# Patient Record
Sex: MALE | Race: WHITE | Employment: OTHER | RURAL
[De-identification: names, ages, dates, MRNs, and addresses within clinical notes are randomized per-mention and may not be internally consistent; named-entity substitution may affect disease eponyms.]

---

## 2017-03-31 ENCOUNTER — OFFICE VISIT (OUTPATIENT)
Dept: PRIMARY CARE CLINIC | Age: 67
End: 2017-03-31
Payer: MEDICARE

## 2017-03-31 VITALS
BODY MASS INDEX: 28.09 KG/M2 | HEART RATE: 97 BPM | TEMPERATURE: 97.7 F | OXYGEN SATURATION: 92 % | DIASTOLIC BLOOD PRESSURE: 80 MMHG | WEIGHT: 190.2 LBS | SYSTOLIC BLOOD PRESSURE: 130 MMHG

## 2017-03-31 DIAGNOSIS — J40 BRONCHITIS: ICD-10-CM

## 2017-03-31 DIAGNOSIS — M79.10 MYALGIA: Primary | ICD-10-CM

## 2017-03-31 DIAGNOSIS — R05.9 COUGH: ICD-10-CM

## 2017-03-31 DIAGNOSIS — R51.9 SINUS HEADACHE: ICD-10-CM

## 2017-03-31 DIAGNOSIS — J01.10 SUBACUTE FRONTAL SINUSITIS: ICD-10-CM

## 2017-03-31 LAB
INFLUENZA A ANTIBODY: NORMAL
INFLUENZA B ANTIBODY: NORMAL

## 2017-03-31 PROCEDURE — 99213 OFFICE O/P EST LOW 20 MIN: CPT | Performed by: NURSE PRACTITIONER

## 2017-03-31 PROCEDURE — 4004F PT TOBACCO SCREEN RCVD TLK: CPT | Performed by: NURSE PRACTITIONER

## 2017-03-31 PROCEDURE — G8427 DOCREV CUR MEDS BY ELIG CLIN: HCPCS | Performed by: NURSE PRACTITIONER

## 2017-03-31 PROCEDURE — G8484 FLU IMMUNIZE NO ADMIN: HCPCS | Performed by: NURSE PRACTITIONER

## 2017-03-31 PROCEDURE — 87804 INFLUENZA ASSAY W/OPTIC: CPT | Performed by: NURSE PRACTITIONER

## 2017-03-31 PROCEDURE — G8419 CALC BMI OUT NRM PARAM NOF/U: HCPCS | Performed by: NURSE PRACTITIONER

## 2017-03-31 PROCEDURE — 3017F COLORECTAL CA SCREEN DOC REV: CPT | Performed by: NURSE PRACTITIONER

## 2017-03-31 RX ORDER — LEVOFLOXACIN 500 MG/1
500 TABLET, FILM COATED ORAL DAILY
Qty: 10 TABLET | Refills: 0 | Status: SHIPPED | OUTPATIENT
Start: 2017-03-31 | End: 2017-04-10

## 2017-03-31 ASSESSMENT — ENCOUNTER SYMPTOMS
COUGH: 1
GASTROINTESTINAL NEGATIVE: 1
SORE THROAT: 1
EYES NEGATIVE: 1
SINUS PRESSURE: 1

## 2017-04-12 ENCOUNTER — OFFICE VISIT (OUTPATIENT)
Dept: PHARMACY | Age: 67
End: 2017-04-12

## 2017-04-12 DIAGNOSIS — Z79.899 MEDICATION MANAGEMENT: Primary | ICD-10-CM

## 2017-04-13 RX ORDER — QUETIAPINE FUMARATE 200 MG/1
200 TABLET, FILM COATED ORAL 2 TIMES DAILY
COMMUNITY
End: 2019-08-01 | Stop reason: SDUPTHER

## 2017-04-13 RX ORDER — METHOCARBAMOL 750 MG/1
750 TABLET, FILM COATED ORAL 3 TIMES DAILY PRN
COMMUNITY
End: 2017-10-10

## 2017-04-13 RX ORDER — BUDESONIDE AND FORMOTEROL FUMARATE DIHYDRATE 160; 4.5 UG/1; UG/1
2 AEROSOL RESPIRATORY (INHALATION) 2 TIMES DAILY PRN
COMMUNITY
End: 2019-08-01 | Stop reason: SDUPTHER

## 2017-04-13 RX ORDER — FLUTICASONE PROPIONATE 50 MCG
1 SPRAY, SUSPENSION (ML) NASAL DAILY PRN
COMMUNITY
End: 2019-08-01 | Stop reason: SDUPTHER

## 2017-04-13 RX ORDER — DULOXETIN HYDROCHLORIDE 60 MG/1
60 CAPSULE, DELAYED RELEASE ORAL DAILY
COMMUNITY
End: 2019-08-01 | Stop reason: SDUPTHER

## 2017-04-13 RX ORDER — ALBUTEROL SULFATE 90 UG/1
2 AEROSOL, METERED RESPIRATORY (INHALATION) EVERY 6 HOURS PRN
COMMUNITY
End: 2019-08-01 | Stop reason: SDUPTHER

## 2017-04-13 RX ORDER — MIRTAZAPINE 30 MG/1
30 TABLET, FILM COATED ORAL NIGHTLY
COMMUNITY
End: 2017-10-10

## 2017-04-13 RX ORDER — UBIDECARENONE 75 MG
500 CAPSULE ORAL DAILY
COMMUNITY
End: 2019-08-01 | Stop reason: SDUPTHER

## 2017-05-30 ENCOUNTER — HOSPITAL ENCOUNTER (OUTPATIENT)
Dept: OTHER | Age: 67
Discharge: HOME | End: 2017-05-30
Attending: NURSE PRACTITIONER | Admitting: NURSE PRACTITIONER

## 2017-05-30 DIAGNOSIS — M54.9 BACK PAIN, UNSPECIFIED BACK LOCATION, UNSPECIFIED BACK PAIN LATERALITY, UNSPECIFIED CHRONICITY: ICD-10-CM

## 2017-05-30 DIAGNOSIS — M54.2 NECK PAIN: ICD-10-CM

## 2017-06-09 ENCOUNTER — HOSPITAL ENCOUNTER (OUTPATIENT)
Dept: OTHER | Age: 67
Discharge: HOME | End: 2017-06-09
Attending: NURSE PRACTITIONER | Admitting: NURSE PRACTITIONER

## 2017-06-09 LAB
A/G RATIO: 1.4 (ref 0.8–2)
ALBUMIN SERPL-MCNC: 4.5 G/DL (ref 3.4–4.8)
ALP BLD-CCNC: 39 U/L (ref 25–100)
ALT SERPL-CCNC: 14 U/L (ref 4–36)
ANION GAP SERPL CALCULATED.3IONS-SCNC: 16 MMOL/L (ref 3–16)
AST SERPL-CCNC: 16 U/L (ref 8–33)
BASOPHILS ABSOLUTE: 0 K/UL (ref 0–0.1)
BASOPHILS RELATIVE PERCENT: 0.5 %
BILIRUB SERPL-MCNC: 0.5 MG/DL (ref 0.3–1.2)
BUN BLDV-MCNC: 8 MG/DL (ref 6–20)
CALCIUM SERPL-MCNC: 9.6 MG/DL (ref 8.5–10.5)
CHLORIDE BLD-SCNC: 95 MMOL/L (ref 98–107)
CHOLESTEROL, TOTAL: 202 MG/DL (ref 0–200)
CO2: 25 MMOL/L (ref 20–30)
CREAT SERPL-MCNC: 1 MG/DL (ref 0.4–1.2)
EOSINOPHILS ABSOLUTE: 0.2 K/UL (ref 0–0.4)
EOSINOPHILS RELATIVE PERCENT: 2.6 %
FOLATE: 2.98 NG/ML
GFR AFRICAN AMERICAN: >59
GFR NON-AFRICAN AMERICAN: >59
GLOBULIN: 3.2 G/DL
GLUCOSE BLD-MCNC: 86 MG/DL (ref 74–106)
HCT VFR BLD CALC: 45.4 % (ref 40–54)
HDLC SERPL-MCNC: 66 MG/DL (ref 40–60)
HEMOGLOBIN: 15 G/DL (ref 13–18)
LDL CHOLESTEROL CALCULATED: 116 MG/DL
LYMPHOCYTES ABSOLUTE: 1.9 K/UL (ref 1.5–4)
LYMPHOCYTES RELATIVE PERCENT: 23.2 % (ref 20–40)
MCH RBC QN AUTO: 32.6 PG (ref 27–32)
MCHC RBC AUTO-ENTMCNC: 33 G/DL (ref 31–35)
MCV RBC AUTO: 98.7 FL (ref 80–100)
MONOCYTES ABSOLUTE: 1 K/UL (ref 0.2–0.8)
MONOCYTES RELATIVE PERCENT: 12.3 % (ref 3–10)
NEUTROPHILS ABSOLUTE: 5 K/UL (ref 2–7.5)
NEUTROPHILS RELATIVE PERCENT: 61.4 %
PDW BLD-RTO: 14.4 % (ref 11–16)
PLATELET # BLD: 392 K/UL (ref 150–400)
PMV BLD AUTO: 10.3 FL (ref 6–10)
POTASSIUM SERPL-SCNC: 4.7 MMOL/L (ref 3.4–5.1)
RBC # BLD: 4.6 M/UL (ref 4.5–6)
SODIUM BLD-SCNC: 136 MMOL/L (ref 136–145)
TOTAL PROTEIN: 7.7 G/DL (ref 6.4–8.3)
TRIGL SERPL-MCNC: 100 MG/DL (ref 0–249)
TSH SERPL DL<=0.05 MIU/L-ACNC: 0.88 UIU/ML (ref 0.35–5.5)
VITAMIN B-12: 190 PG/ML (ref 211–911)
VLDLC SERPL CALC-MCNC: 20 MG/DL
WBC # BLD: 8.1 K/UL (ref 4–11)

## 2017-07-13 ENCOUNTER — CONSULT (OUTPATIENT)
Dept: CARDIOLOGY | Facility: CLINIC | Age: 67
End: 2017-07-13

## 2017-07-13 ENCOUNTER — HOSPITAL ENCOUNTER (OUTPATIENT)
Dept: OTHER | Age: 67
Discharge: HOME | End: 2017-07-13
Attending: INTERNAL MEDICINE | Admitting: INTERNAL MEDICINE

## 2017-07-13 VITALS
DIASTOLIC BLOOD PRESSURE: 70 MMHG | BODY MASS INDEX: 27 KG/M2 | SYSTOLIC BLOOD PRESSURE: 124 MMHG | HEIGHT: 70 IN | HEART RATE: 76 BPM | WEIGHT: 188.6 LBS

## 2017-07-13 DIAGNOSIS — I10 ESSENTIAL HYPERTENSION: Primary | ICD-10-CM

## 2017-07-13 PROCEDURE — 99213 OFFICE O/P EST LOW 20 MIN: CPT | Performed by: INTERNAL MEDICINE

## 2017-07-13 RX ORDER — MIRTAZAPINE 30 MG/1
TABLET, FILM COATED ORAL DAILY
Refills: 2 | COMMUNITY
Start: 2017-06-30

## 2017-07-13 RX ORDER — METHOCARBAMOL 750 MG/1
TABLET, FILM COATED ORAL AS NEEDED
Refills: 0 | COMMUNITY
Start: 2017-07-07

## 2017-07-13 RX ORDER — CETIRIZINE HYDROCHLORIDE 10 MG/1
TABLET ORAL DAILY
Refills: 0 | COMMUNITY
Start: 2017-04-28

## 2017-07-13 RX ORDER — METOPROLOL SUCCINATE 25 MG/1
TABLET, EXTENDED RELEASE ORAL DAILY
Refills: 5 | COMMUNITY
Start: 2017-06-30

## 2017-07-13 RX ORDER — BUDESONIDE AND FORMOTEROL FUMARATE DIHYDRATE 160; 4.5 UG/1; UG/1
AEROSOL RESPIRATORY (INHALATION) AS NEEDED
Refills: 5 | COMMUNITY
Start: 2017-06-30

## 2017-07-13 RX ORDER — DULOXETIN HYDROCHLORIDE 60 MG/1
CAPSULE, DELAYED RELEASE ORAL DAILY
Refills: 2 | COMMUNITY
Start: 2017-06-30

## 2017-07-13 RX ORDER — HYDROCODONE BITARTRATE AND ACETAMINOPHEN 7.5; 325 MG/1; MG/1
TABLET ORAL AS NEEDED
Refills: 0 | COMMUNITY
Start: 2017-07-07

## 2017-07-13 RX ORDER — ALPRAZOLAM 0.5 MG/1
TABLET ORAL AS NEEDED
Refills: 0 | COMMUNITY
Start: 2017-07-07

## 2017-07-13 RX ORDER — OMEPRAZOLE 20 MG/1
CAPSULE, DELAYED RELEASE ORAL DAILY
Refills: 5 | COMMUNITY
Start: 2017-06-30

## 2017-07-13 RX ORDER — LANOLIN ALCOHOL/MO/W.PET/CERES
CREAM (GRAM) TOPICAL DAILY
Refills: 5 | COMMUNITY
Start: 2017-07-07

## 2017-07-13 RX ORDER — GABAPENTIN 800 MG/1
TABLET ORAL 2 TIMES DAILY
Refills: 0 | COMMUNITY
Start: 2017-07-07

## 2017-07-13 RX ORDER — QUETIAPINE FUMARATE 200 MG/1
TABLET, FILM COATED ORAL DAILY
Refills: 5 | COMMUNITY
Start: 2017-06-30

## 2017-07-13 NOTE — PROGRESS NOTES
Subjective:     Encounter Date:07/13/2017      Patient ID: Max Kelly is a 67 y.o. male.    Chief Complaint:Hypertension  HPI  This is a 67-year-old male patient who is been followed in the past by Dr. Damon for hypertension.  Several years ago the patient had a presyncopal episode and had a thorough workup which was unremarkable.  The patient has had intermittent episodes of dizziness with postural change but no recurrent syncope.  He denies having chest discomfort.  There is no exertional chest arm neck jaw shoulder or back discomfort.  He has no orthopnea PND or lower extremity edema.  He has no palpitations or syncope.  The remainder of his past medical history, family history and social history remains unchanged compared to his last visit.  The following portions of the patient's history were reviewed and updated as appropriate: allergies, current medications, past family history, past medical history, past social history, past surgical history and problem  Review of Systems   Constitution: Negative for chills, diaphoresis, fever, weakness, malaise/fatigue, night sweats, weight gain and weight loss.   HENT: Negative for ear discharge, hearing loss, hoarse voice and nosebleeds.    Eyes: Negative for discharge, double vision, pain and photophobia.   Cardiovascular: Negative for chest pain, claudication, cyanosis, dyspnea on exertion, irregular heartbeat, leg swelling, near-syncope, orthopnea, palpitations, paroxysmal nocturnal dyspnea and syncope.   Respiratory: Negative for cough, hemoptysis, shortness of breath, sputum production and wheezing.    Endocrine: Negative for cold intolerance, heat intolerance, polydipsia, polyphagia and polyuria.   Hematologic/Lymphatic: Negative for adenopathy and bleeding problem. Does not bruise/bleed easily.   Skin: Negative for color change, flushing, itching and rash.   Musculoskeletal: Negative for muscle cramps, muscle weakness, myalgias and stiffness.    Gastrointestinal: Negative for abdominal pain, diarrhea, hematemesis, hematochezia, nausea and vomiting.   Genitourinary: Negative for dysuria, frequency and nocturia.   Neurological: Positive for dizziness. Negative for focal weakness, loss of balance, numbness, paresthesias and seizures.   Psychiatric/Behavioral: Negative for altered mental status, hallucinations and suicidal ideas.   Allergic/Immunologic: Negative for HIV exposure, hives and persistent infections.       Current Outpatient Prescriptions:   •  ALPRAZolam (XANAX) 0.5 MG tablet, As Needed., Disp: , Rfl: 0  •  cetirizine (zyrTEC) 10 MG tablet, Daily., Disp: , Rfl: 0  •  DULoxetine (CYMBALTA) 60 MG capsule, Daily., Disp: , Rfl: 2  •  gabapentin (NEURONTIN) 800 MG tablet, 2 (Two) Times a Day., Disp: , Rfl: 0  •  HYDROcodone-acetaminophen (NORCO) 7.5-325 MG per tablet, As Needed., Disp: , Rfl: 0  •  methocarbamol (ROBAXIN) 750 MG tablet, As Needed., Disp: , Rfl: 0  •  metoprolol succinate XL (TOPROL-XL) 25 MG 24 hr tablet, Daily., Disp: , Rfl: 5  •  mirtazapine (REMERON) 30 MG tablet, Daily., Disp: , Rfl: 2  •  omeprazole (priLOSEC) 20 MG capsule, Daily., Disp: , Rfl: 5  •  PROAIR  (90 BASE) MCG/ACT inhaler, As Needed., Disp: , Rfl: 5  •  QUEtiapine (SEROquel) 200 MG tablet, Daily., Disp: , Rfl: 5  •  SYMBICORT 160-4.5 MCG/ACT inhaler, As Needed., Disp: , Rfl: 5  •  vitamin B-12 (CYANOCOBALAMIN) 1000 MCG tablet, Daily., Disp: , Rfl: 5     Objective:     Physical Exam   Constitutional: He is oriented to person, place, and time. He appears well-developed and well-nourished.   HENT:   Head: Normocephalic and atraumatic.   Eyes: Conjunctivae are normal. No scleral icterus.   Cardiovascular: Normal rate, regular rhythm, normal heart sounds and intact distal pulses.  Exam reveals no gallop and no friction rub.    No murmur heard.  Pulmonary/Chest: Effort normal and breath sounds normal. No respiratory distress.   Abdominal: Soft. Bowel sounds are  "normal. There is no tenderness.   Musculoskeletal: He exhibits no edema.   Neurological: He is alert and oriented to person, place, and time.   Skin: Skin is warm and dry.   Psychiatric: He has a normal mood and affect. His behavior is normal.     Blood pressure 124/70, pulse 76, height 69.5\" (176.5 cm), weight 188 lb 9.6 oz (85.5 kg).   Lab Review:       Assessment:         1. Essential hypertension  Excellent blood pressure control    Procedures     Plan:       No change in medications indicated.  No additional cardiovascular testing is warranted at this time.  The patient will follow-up in our office in 6-12 months.         "

## 2017-10-21 PROBLEM — S81.811A LACERATION OF LEG, RIGHT, INITIAL ENCOUNTER: Status: ACTIVE | Noted: 2017-10-21

## 2018-06-18 ENCOUNTER — HOSPITAL ENCOUNTER (OUTPATIENT)
Dept: OTHER | Age: 68
Discharge: HOME | End: 2018-06-18

## 2018-06-18 DIAGNOSIS — R52 PAIN: ICD-10-CM

## 2018-06-18 LAB
A/G RATIO: 1.7 (ref 0.8–2)
ALBUMIN SERPL-MCNC: 4.4 G/DL (ref 3.4–4.8)
ALP BLD-CCNC: 36 U/L (ref 25–100)
ALT SERPL-CCNC: 15 U/L (ref 4–36)
ANION GAP SERPL CALCULATED.3IONS-SCNC: 12 MMOL/L (ref 3–16)
AST SERPL-CCNC: 18 U/L (ref 8–33)
BASOPHILS ABSOLUTE: 0.1 K/UL (ref 0–0.1)
BASOPHILS RELATIVE PERCENT: 0.6 %
BILIRUB SERPL-MCNC: 0.4 MG/DL (ref 0.3–1.2)
BUN BLDV-MCNC: 10 MG/DL (ref 6–20)
CALCIUM SERPL-MCNC: 9.4 MG/DL (ref 8.5–10.5)
CHLORIDE BLD-SCNC: 97 MMOL/L (ref 98–107)
CHOLESTEROL, TOTAL: 210 MG/DL (ref 0–200)
CO2: 29 MMOL/L (ref 20–30)
CREAT SERPL-MCNC: 1 MG/DL (ref 0.4–1.2)
EOSINOPHILS ABSOLUTE: 0.2 K/UL (ref 0–0.4)
EOSINOPHILS RELATIVE PERCENT: 2.9 %
FOLATE: 5.2 NG/ML
GFR AFRICAN AMERICAN: >59
GFR NON-AFRICAN AMERICAN: >59
GLOBULIN: 2.6 G/DL
GLUCOSE BLD-MCNC: 86 MG/DL (ref 74–106)
HCT VFR BLD CALC: 44.5 % (ref 40–54)
HDLC SERPL-MCNC: 62 MG/DL (ref 40–60)
HEMOGLOBIN: 13.9 G/DL (ref 13–18)
IMMATURE GRANULOCYTES #: 0 K/UL
IMMATURE GRANULOCYTES %: 0.4 % (ref 0–5)
LDL CHOLESTEROL CALCULATED: 129 MG/DL
LYMPHOCYTES ABSOLUTE: 1.6 K/UL (ref 1.5–4)
LYMPHOCYTES RELATIVE PERCENT: 20.4 %
MCH RBC QN AUTO: 30.6 PG (ref 27–32)
MCHC RBC AUTO-ENTMCNC: 31.2 G/DL (ref 31–35)
MCV RBC AUTO: 98 FL (ref 80–100)
MONOCYTES ABSOLUTE: 0.8 K/UL (ref 0.2–0.8)
MONOCYTES RELATIVE PERCENT: 10.6 %
NEUTROPHILS ABSOLUTE: 5.2 K/UL (ref 2–7.5)
NEUTROPHILS RELATIVE PERCENT: 65.1 %
PDW BLD-RTO: 13.4 % (ref 11–16)
PLATELET # BLD: 357 K/UL (ref 150–400)
PMV BLD AUTO: 10.4 FL (ref 6–10)
POTASSIUM SERPL-SCNC: 4.8 MMOL/L (ref 3.4–5.1)
RBC # BLD: 4.54 M/UL (ref 4.5–6)
SODIUM BLD-SCNC: 138 MMOL/L (ref 136–145)
TOTAL PROTEIN: 7 G/DL (ref 6.4–8.3)
TRIGL SERPL-MCNC: 95 MG/DL (ref 0–249)
TSH SERPL DL<=0.05 MIU/L-ACNC: 1.88 UIU/ML (ref 0.35–5.5)
VITAMIN B-12: 318 PG/ML (ref 211–911)
VLDLC SERPL CALC-MCNC: 19 MG/DL
WBC # BLD: 7.9 K/UL (ref 4–11)

## 2018-07-02 ENCOUNTER — HOSPITAL ENCOUNTER (OUTPATIENT)
Dept: GENERAL RADIOLOGY | Age: 68
Discharge: HOME | End: 2018-07-02
Admitting: NURSE PRACTITIONER

## 2018-07-02 DIAGNOSIS — M54.50 LOW BACK PAIN: ICD-10-CM

## 2018-07-02 DIAGNOSIS — R52 PAIN: ICD-10-CM

## 2018-10-25 ENCOUNTER — HOSPITAL ENCOUNTER (OUTPATIENT)
Facility: HOSPITAL | Age: 68
Discharge: HOME | End: 2018-10-25
Payer: COMMERCIAL

## 2018-10-25 LAB
A/G RATIO: 1.6 (ref 0.8–2)
ALBUMIN SERPL-MCNC: 4.4 G/DL (ref 3.4–4.8)
ALP BLD-CCNC: 33 U/L (ref 25–100)
ALT SERPL-CCNC: 11 U/L (ref 4–36)
ANION GAP SERPL CALCULATED.3IONS-SCNC: 14 MMOL/L (ref 3–16)
AST SERPL-CCNC: 18 U/L (ref 8–33)
BASOPHILS ABSOLUTE: 0.1 K/UL (ref 0–0.1)
BASOPHILS RELATIVE PERCENT: 0.9 %
BILIRUB SERPL-MCNC: 0.5 MG/DL (ref 0.3–1.2)
BUN BLDV-MCNC: 9 MG/DL (ref 6–20)
CALCIUM SERPL-MCNC: 9.5 MG/DL (ref 8.5–10.5)
CHLORIDE BLD-SCNC: 100 MMOL/L (ref 98–107)
CHOLESTEROL, TOTAL: 172 MG/DL (ref 0–200)
CO2: 24 MMOL/L (ref 20–30)
CREAT SERPL-MCNC: 1 MG/DL (ref 0.4–1.2)
EOSINOPHILS ABSOLUTE: 0.3 K/UL (ref 0–0.4)
EOSINOPHILS RELATIVE PERCENT: 3 %
FOLATE: 5.47 NG/ML
GFR AFRICAN AMERICAN: >59
GFR NON-AFRICAN AMERICAN: >59
GLOBULIN: 2.7 G/DL
GLUCOSE BLD-MCNC: 95 MG/DL (ref 74–106)
HCT VFR BLD CALC: 45.7 % (ref 40–54)
HDLC SERPL-MCNC: 58 MG/DL (ref 40–60)
HEMOGLOBIN: 14.2 G/DL (ref 13–18)
IMMATURE GRANULOCYTES #: 0 K/UL
IMMATURE GRANULOCYTES %: 0.5 % (ref 0–5)
LDL CHOLESTEROL CALCULATED: 101 MG/DL
LYMPHOCYTES ABSOLUTE: 1.6 K/UL (ref 1.5–4)
LYMPHOCYTES RELATIVE PERCENT: 18.6 %
MCH RBC QN AUTO: 31 PG (ref 27–32)
MCHC RBC AUTO-ENTMCNC: 31.1 G/DL (ref 31–35)
MCV RBC AUTO: 99.8 FL (ref 80–100)
MONOCYTES ABSOLUTE: 0.8 K/UL (ref 0.2–0.8)
MONOCYTES RELATIVE PERCENT: 9.2 %
NEUTROPHILS ABSOLUTE: 5.7 K/UL (ref 2–7.5)
NEUTROPHILS RELATIVE PERCENT: 67.8 %
PDW BLD-RTO: 14.6 % (ref 11–16)
PLATELET # BLD: 342 K/UL (ref 150–400)
PMV BLD AUTO: 10.9 FL (ref 6–10)
POTASSIUM SERPL-SCNC: 5.2 MMOL/L (ref 3.4–5.1)
PROSTATE SPECIFIC ANTIGEN: 0.7 NG/ML (ref 0–4)
RBC # BLD: 4.58 M/UL (ref 4.5–6)
SODIUM BLD-SCNC: 138 MMOL/L (ref 136–145)
TOTAL PROTEIN: 7.1 G/DL (ref 6.4–8.3)
TRIGL SERPL-MCNC: 67 MG/DL (ref 0–249)
TSH SERPL DL<=0.05 MIU/L-ACNC: 1.25 UIU/ML (ref 0.35–5.5)
VITAMIN B-12: 390 PG/ML (ref 211–911)
VLDLC SERPL CALC-MCNC: 13 MG/DL
WBC # BLD: 8.5 K/UL (ref 4–11)

## 2018-10-25 PROCEDURE — 80061 LIPID PANEL: CPT

## 2018-10-25 PROCEDURE — 36415 COLL VENOUS BLD VENIPUNCTURE: CPT

## 2018-10-25 PROCEDURE — 82607 VITAMIN B-12: CPT

## 2018-10-25 PROCEDURE — 80053 COMPREHEN METABOLIC PANEL: CPT

## 2018-10-25 PROCEDURE — 82746 ASSAY OF FOLIC ACID SERUM: CPT

## 2018-10-25 PROCEDURE — 85025 COMPLETE CBC W/AUTO DIFF WBC: CPT

## 2018-10-25 PROCEDURE — 84443 ASSAY THYROID STIM HORMONE: CPT

## 2018-10-25 PROCEDURE — 84153 ASSAY OF PSA TOTAL: CPT

## 2019-01-17 ENCOUNTER — HOSPITAL ENCOUNTER (EMERGENCY)
Facility: HOSPITAL | Age: 69
Discharge: HOME | End: 2019-01-17
Attending: HOSPITALIST
Payer: COMMERCIAL

## 2019-01-17 ENCOUNTER — APPOINTMENT (OUTPATIENT)
Dept: CT IMAGING | Facility: HOSPITAL | Age: 69
End: 2019-01-17
Payer: COMMERCIAL

## 2019-01-17 VITALS
HEART RATE: 74 BPM | OXYGEN SATURATION: 97 % | SYSTOLIC BLOOD PRESSURE: 163 MMHG | RESPIRATION RATE: 18 BRPM | BODY MASS INDEX: 28.29 KG/M2 | DIASTOLIC BLOOD PRESSURE: 108 MMHG | HEIGHT: 69 IN | WEIGHT: 191 LBS

## 2019-01-17 DIAGNOSIS — R42 DIZZINESS: ICD-10-CM

## 2019-01-17 DIAGNOSIS — R55 NEAR SYNCOPE: Primary | ICD-10-CM

## 2019-01-17 LAB
A/G RATIO: 1.3 (ref 0.8–2)
ALBUMIN SERPL-MCNC: 3.9 G/DL (ref 3.4–4.8)
ALP BLD-CCNC: 29 U/L (ref 25–100)
ALT SERPL-CCNC: 11 U/L (ref 4–36)
AMPHETAMINE SCREEN, URINE: ABNORMAL
ANION GAP SERPL CALCULATED.3IONS-SCNC: 11 MMOL/L (ref 3–16)
AST SERPL-CCNC: 12 U/L (ref 8–33)
BARBITURATE SCREEN URINE: ABNORMAL
BASOPHILS ABSOLUTE: 0.1 K/UL (ref 0–0.1)
BASOPHILS RELATIVE PERCENT: 0.7 %
BENZODIAZEPINE SCREEN, URINE: POSITIVE
BILIRUB SERPL-MCNC: 0.7 MG/DL (ref 0.3–1.2)
BILIRUBIN URINE: NEGATIVE
BLOOD, URINE: NEGATIVE
BUN BLDV-MCNC: 13 MG/DL (ref 6–20)
CALCIUM SERPL-MCNC: 8.5 MG/DL (ref 8.5–10.5)
CANNABINOID SCREEN URINE: ABNORMAL
CHLORIDE BLD-SCNC: 98 MMOL/L (ref 98–107)
CLARITY: CLEAR
CO2: 26 MMOL/L (ref 20–30)
COCAINE METABOLITE SCREEN URINE: ABNORMAL
COLOR: YELLOW
CREAT SERPL-MCNC: 1.1 MG/DL (ref 0.4–1.2)
EOSINOPHILS ABSOLUTE: 0.2 K/UL (ref 0–0.4)
EOSINOPHILS RELATIVE PERCENT: 2.1 %
GFR AFRICAN AMERICAN: >59
GFR NON-AFRICAN AMERICAN: >59
GLOBULIN: 2.9 G/DL
GLUCOSE BLD-MCNC: 88 MG/DL (ref 74–106)
GLUCOSE URINE: NEGATIVE MG/DL
HCT VFR BLD CALC: 48.1 % (ref 40–54)
HEMOGLOBIN: 15.3 G/DL (ref 13–18)
IMMATURE GRANULOCYTES #: 0 K/UL
IMMATURE GRANULOCYTES %: 0.4 % (ref 0–5)
KETONES, URINE: NEGATIVE MG/DL
LEUKOCYTE ESTERASE, URINE: NEGATIVE
LIPASE: 42 U/L (ref 5.6–51.3)
LYMPHOCYTES ABSOLUTE: 1.8 K/UL (ref 1.5–4)
LYMPHOCYTES RELATIVE PERCENT: 19.6 %
Lab: ABNORMAL
MCH RBC QN AUTO: 31.9 PG (ref 27–32)
MCHC RBC AUTO-ENTMCNC: 31.8 G/DL (ref 31–35)
MCV RBC AUTO: 100.4 FL (ref 80–100)
METHADONE SCREEN, URINE: ABNORMAL
METHAMPHETAMINE, URINE: ABNORMAL
MICROSCOPIC EXAMINATION: NORMAL
MONOCYTES ABSOLUTE: 1 K/UL (ref 0.2–0.8)
MONOCYTES RELATIVE PERCENT: 11.3 %
NEUTROPHILS ABSOLUTE: 5.9 K/UL (ref 2–7.5)
NEUTROPHILS RELATIVE PERCENT: 65.9 %
NITRITE, URINE: NEGATIVE
OPIATE SCREEN URINE: ABNORMAL
PDW BLD-RTO: 12.9 % (ref 11–16)
PH UA: 7
PHENCYCLIDINE SCREEN URINE: ABNORMAL
PLATELET # BLD: 377 K/UL (ref 150–400)
PMV BLD AUTO: 9.6 FL (ref 6–10)
POTASSIUM REFLEX MAGNESIUM: 4.7 MMOL/L (ref 3.4–5.1)
PROPOXYPHENE SCREEN, URINE: ABNORMAL
PROTEIN UA: NEGATIVE MG/DL
RBC # BLD: 4.79 M/UL (ref 4.5–6)
REASON FOR REJECTION: NORMAL
REJECTED TEST: NORMAL
SODIUM BLD-SCNC: 135 MMOL/L (ref 136–145)
SPECIFIC GRAVITY UA: 1.01
TOTAL PROTEIN: 6.8 G/DL (ref 6.4–8.3)
TRICYCLIC, URINE: POSITIVE
TROPONIN: <0.3 NG/ML
UR OXYCODONE RAPID SCREEN: ABNORMAL
URINE REFLEX TO CULTURE: NORMAL
URINE TYPE: NORMAL
UROBILINOGEN, URINE: 0.2 E.U./DL
WBC # BLD: 8.9 K/UL (ref 4–11)

## 2019-01-17 PROCEDURE — 70450 CT HEAD/BRAIN W/O DYE: CPT

## 2019-01-17 PROCEDURE — 81003 URINALYSIS AUTO W/O SCOPE: CPT

## 2019-01-17 PROCEDURE — 99284 EMERGENCY DEPT VISIT MOD MDM: CPT

## 2019-01-17 PROCEDURE — 93005 ELECTROCARDIOGRAM TRACING: CPT

## 2019-01-17 PROCEDURE — 80307 DRUG TEST PRSMV CHEM ANLYZR: CPT

## 2019-01-17 PROCEDURE — 84484 ASSAY OF TROPONIN QUANT: CPT

## 2019-01-17 PROCEDURE — 85025 COMPLETE CBC W/AUTO DIFF WBC: CPT

## 2019-01-17 PROCEDURE — 36415 COLL VENOUS BLD VENIPUNCTURE: CPT

## 2019-01-17 PROCEDURE — 83690 ASSAY OF LIPASE: CPT

## 2019-01-17 PROCEDURE — 80053 COMPREHEN METABOLIC PANEL: CPT

## 2019-01-17 PROCEDURE — 2580000003 HC RX 258: Performed by: HOSPITALIST

## 2019-01-17 RX ORDER — 0.9 % SODIUM CHLORIDE 0.9 %
1000 INTRAVENOUS SOLUTION INTRAVENOUS ONCE
Status: COMPLETED | OUTPATIENT
Start: 2019-01-17 | End: 2019-01-17

## 2019-01-17 RX ADMIN — SODIUM CHLORIDE 1000 ML: 9 INJECTION, SOLUTION INTRAVENOUS at 17:35

## 2019-01-17 ASSESSMENT — PAIN DESCRIPTION - DESCRIPTORS: DESCRIPTORS: ACHING

## 2019-01-17 ASSESSMENT — PAIN DESCRIPTION - LOCATION: LOCATION: ABDOMEN

## 2019-01-17 ASSESSMENT — PAIN DESCRIPTION - FREQUENCY: FREQUENCY: CONTINUOUS

## 2019-01-17 ASSESSMENT — PAIN DESCRIPTION - PAIN TYPE: TYPE: ACUTE PAIN

## 2019-01-17 ASSESSMENT — PAIN DESCRIPTION - ORIENTATION: ORIENTATION: RIGHT;UPPER

## 2019-01-17 ASSESSMENT — PAIN SCALES - GENERAL: PAINLEVEL_OUTOF10: 1

## 2019-04-22 ENCOUNTER — OFFICE VISIT (OUTPATIENT)
Dept: NEUROLOGY | Facility: CLINIC | Age: 69
End: 2019-04-22

## 2019-04-22 VITALS
SYSTOLIC BLOOD PRESSURE: 133 MMHG | DIASTOLIC BLOOD PRESSURE: 99 MMHG | WEIGHT: 185 LBS | HEART RATE: 103 BPM | BODY MASS INDEX: 26.48 KG/M2 | HEIGHT: 70 IN

## 2019-04-22 DIAGNOSIS — G89.29 CHRONIC LOW BACK PAIN WITH SCIATICA, SCIATICA LATERALITY UNSPECIFIED, UNSPECIFIED BACK PAIN LATERALITY: Primary | ICD-10-CM

## 2019-04-22 DIAGNOSIS — M48.50XA VERTEBRAL COMPRESSION FRACTURE (HCC): ICD-10-CM

## 2019-04-22 DIAGNOSIS — M54.40 CHRONIC LOW BACK PAIN WITH SCIATICA, SCIATICA LATERALITY UNSPECIFIED, UNSPECIFIED BACK PAIN LATERALITY: Primary | ICD-10-CM

## 2019-04-22 DIAGNOSIS — Z98.1 S/P LUMBAR SPINAL FUSION: ICD-10-CM

## 2019-04-22 PROCEDURE — 99203 OFFICE O/P NEW LOW 30 MIN: CPT | Performed by: PSYCHIATRY & NEUROLOGY

## 2019-04-22 NOTE — PROGRESS NOTES
Subjective:     Patient ID: Max Kelly is a 68 y.o. male.    CC:   Chief Complaint   Patient presents with   • POLYNEUROPATHY       HPI:   History of Present Illness  The following portions of the patient's history were reviewed and updated as appropriate: allergies, current medications, past family history, past medical history, past social history, past surgical history and problem list.     69 y/o male with chronic low back, vertebral compression fractures related to an injury, s/p spinal surgery and mechanical fusion, on gabapentin and hydrocodone for years. Last CT of ls spine last year with severe degenerative changes, severe L2 compression fracture with moderate stenosis, postop changes, pedicle screws. He has been out of meds for several months and reports that he is in pain, has difficulty getting around. He has a h/o b12 deficiency. He was seen at [facility] ED in January after a near syncopal spell at the store, CT head with chronic changes, received IV fluids. Denies chest pain or syncope/near syncope since. Thinks that episode may have been triggered by withdrawal from gabapentin, last filled in October.      Past Medical History:   Diagnosis Date   • Bipolar disorder (CMS/HCC)    • Degenerative joint disease    • Hypercholesterolemia    • Hypertension    • Seizure disorder (CMS/HCC)        Past Surgical History:   Procedure Laterality Date   • APPENDECTOMY     • BACK SURGERY     • CHOLECYSTECTOMY     • HAND SURGERY      right       Social History     Socioeconomic History   • Marital status:      Spouse name: Not on file   • Number of children: Not on file   • Years of education: Not on file   • Highest education level: Not on file   Tobacco Use   • Smoking status: Current Every Day Smoker     Packs/day: 1.00   • Smokeless tobacco: Never Used   Substance and Sexual Activity   • Alcohol use: No   • Drug use: No   • Sexual activity: Defer       Family History   Problem Relation Age of Onset   •  Hypertension Mother    • Cancer Father         mouth   • No Known Problems Sister    • Diabetes Sister    • Hypertension Sister    • Stroke Sister         Review of Systems   Constitutional: Negative for chills, fatigue, fever and unexpected weight change.   HENT: Negative for ear pain, hearing loss, nosebleeds, rhinorrhea and sore throat.    Eyes: Negative for photophobia, pain, discharge, itching and visual disturbance.   Respiratory: Negative for cough, chest tightness, shortness of breath and wheezing.    Cardiovascular: Negative for chest pain, palpitations and leg swelling.   Gastrointestinal: Negative for abdominal pain, blood in stool, constipation, diarrhea, nausea and vomiting.   Genitourinary: Negative for dysuria, frequency, hematuria and urgency.   Musculoskeletal: Negative for arthralgias, back pain, gait problem, joint swelling, myalgias, neck pain and neck stiffness.   Skin: Negative for rash and wound.   Allergic/Immunologic: Negative for environmental allergies and food allergies.   Neurological: Negative for dizziness, tremors, seizures, syncope, speech difficulty, weakness, light-headedness, numbness and headaches.   Hematological: Negative for adenopathy. Does not bruise/bleed easily.   Psychiatric/Behavioral: Negative for agitation, confusion, decreased concentration, hallucinations, sleep disturbance and suicidal ideas. The patient is not nervous/anxious.         Objective:    Neurologic Exam     Mental Status   Oriented to person, place, and time.     Cranial Nerves     CN III, IV, VI   Pupils are equal, round, and reactive to light.  Extraocular motions are normal.       Physical Exam   Constitutional: He is oriented to person, place, and time. He appears well-developed and well-nourished.   HENT:   Head: Normocephalic and atraumatic.   Eyes: EOM are normal. Pupils are equal, round, and reactive to light.   Cardiovascular: Normal rate and regular rhythm.   Pulmonary/Chest: Effort normal.    Musculoskeletal:   Decreased ROM ls spine. Antalgic gait.   Neurological: He is alert and oriented to person, place, and time. He has normal reflexes. A sensory deficit is present. No cranial nerve deficit. He exhibits normal muscle tone. Coordination normal.   DTRs hypoactive. Vibration sense at the ankles is mildly decreased.   Psychiatric: He has a normal mood and affect. His behavior is normal. Thought content normal.       Assessment/Plan:       Max was seen today for polyneuropathy.    Diagnoses and all orders for this visit:    Chronic low back pain with sciatica, sciatica laterality unspecified, unspecified back pain laterality    - I think it would be appropriate for the patient to resume gabapentin and perhaps hydrocodone from the standpoint of low back anatomy unless there is a h/o compliance or mental health issues that I am not aware off. He may be best managed in a comprehensive multimodality pain program. I would be glad to see him on as needed basis.  S/P lumbar spinal fusion   - Consider follow up with neurosurgery if symptoms worsen.  Vertebral compression fracture (CMS/HCC)  Near syncope.   - Consider cardiology consult.           Erasmo De Paz MD  4/22/2019

## 2019-05-23 ENCOUNTER — OFFICE VISIT (OUTPATIENT)
Dept: PRIMARY CARE CLINIC | Age: 69
End: 2019-05-23
Payer: MEDICARE

## 2019-05-23 VITALS
WEIGHT: 185 LBS | HEIGHT: 69 IN | BODY MASS INDEX: 27.4 KG/M2 | OXYGEN SATURATION: 98 % | DIASTOLIC BLOOD PRESSURE: 80 MMHG | SYSTOLIC BLOOD PRESSURE: 130 MMHG | HEART RATE: 82 BPM

## 2019-05-23 DIAGNOSIS — J44.9 CHRONIC OBSTRUCTIVE PULMONARY DISEASE, UNSPECIFIED COPD TYPE (HCC): ICD-10-CM

## 2019-05-23 DIAGNOSIS — G89.29 CHRONIC LOW BACK PAIN, UNSPECIFIED BACK PAIN LATERALITY, WITH SCIATICA PRESENCE UNSPECIFIED: Primary | ICD-10-CM

## 2019-05-23 DIAGNOSIS — M54.5 CHRONIC LOW BACK PAIN, UNSPECIFIED BACK PAIN LATERALITY, WITH SCIATICA PRESENCE UNSPECIFIED: Primary | ICD-10-CM

## 2019-05-23 DIAGNOSIS — F41.9 ANXIETY: ICD-10-CM

## 2019-05-23 PROCEDURE — 99213 OFFICE O/P EST LOW 20 MIN: CPT | Performed by: NURSE PRACTITIONER

## 2019-05-23 PROCEDURE — 1123F ACP DISCUSS/DSCN MKR DOCD: CPT | Performed by: NURSE PRACTITIONER

## 2019-05-23 PROCEDURE — 4004F PT TOBACCO SCREEN RCVD TLK: CPT | Performed by: NURSE PRACTITIONER

## 2019-05-23 PROCEDURE — 3023F SPIROM DOC REV: CPT | Performed by: NURSE PRACTITIONER

## 2019-05-23 PROCEDURE — G8926 SPIRO NO PERF OR DOC: HCPCS | Performed by: NURSE PRACTITIONER

## 2019-05-23 PROCEDURE — G8427 DOCREV CUR MEDS BY ELIG CLIN: HCPCS | Performed by: NURSE PRACTITIONER

## 2019-05-23 PROCEDURE — 4040F PNEUMOC VAC/ADMIN/RCVD: CPT | Performed by: NURSE PRACTITIONER

## 2019-05-23 PROCEDURE — G8419 CALC BMI OUT NRM PARAM NOF/U: HCPCS | Performed by: NURSE PRACTITIONER

## 2019-05-23 PROCEDURE — 3017F COLORECTAL CA SCREEN DOC REV: CPT | Performed by: NURSE PRACTITIONER

## 2019-05-23 ASSESSMENT — PATIENT HEALTH QUESTIONNAIRE - PHQ9
SUM OF ALL RESPONSES TO PHQ QUESTIONS 1-9: 0
2. FEELING DOWN, DEPRESSED OR HOPELESS: 0
SUM OF ALL RESPONSES TO PHQ9 QUESTIONS 1 & 2: 0
SUM OF ALL RESPONSES TO PHQ QUESTIONS 1-9: 0
1. LITTLE INTEREST OR PLEASURE IN DOING THINGS: 0

## 2019-05-23 ASSESSMENT — ENCOUNTER SYMPTOMS
SORE THROAT: 0
VOMITING: 0
COUGH: 0
EYE PAIN: 0
SHORTNESS OF BREATH: 0
BACK PAIN: 1
ABDOMINAL PAIN: 0
NAUSEA: 0

## 2019-05-23 NOTE — PATIENT INSTRUCTIONS
· Keep a list of your medicines with you. List all of the prescription medicines, nonprescription medicines, supplements, natural remedies, and vitamins that you take. Tell your healthcare providers who treat you about all of the products you are taking. Your provider can provide you with a form to keep track of them. Just ask. · Follow the directions that come with your medicine, including information about food or alcohol. Make sure you know how and when to take your medicine. Do not take more or less than you are supposed to take. · Keep all medicines out of the reach of children. · Store medicines according to the directions on the label. · Monitor yourself. Learn to know how your body reacts to your new medicine and keep track of how it makes you feel before attempting (If your provider has allowed you to do so) to drive or go to work. · Seek emergency medical attention if you think you have used too much of this medicine. An overdose of any prescription medicine can be fatal. Overdose symptoms may include extreme drowsiness, muscle weakness, confusion, cold and clammy skin, pinpoint pupils, shallow breathing, slow heart rate, fainting, or coma. · Don't share prescription medicines with others, even when they seem to have the same symptoms. What may be good for you may be harmful to others. · If you are no longer taking a prescribed medication and you have pills left please take your pills out of their original containers. Mix crushed pills with an undesirable substance, such as cat litter or used coffee grounds. Put the mixture into a disposable container with a lid, such as an empty margarine tub, or into a sealable bag. Cover up or remove any of your personal information on the empty containers by covering it with black permanent marker or duct tape. Place the sealed container with the mixture, and the empty drug containers, in the trash.    · If you use a medication that is in the form of a patch, dispose of used patches by folding them in half so that the sticky sides meet, and then flushing them down a toilet. They should not be placed in the household trash where children or pets can find them. · If you have any questions, ask your provider or pharmacist for more information. · Be sure to keep all appointments for provider visits or tests. We are committed to providing you with the best care possible. In order to help us achieve these goals please remember to bring all medications, herbal products, and over the counter supplements with you to each visit. If your provider has ordered testing for you, please be sure to follow up with our office if you have not received results within 7 days after the testing took place. *If you receive a survey after visiting one of our offices, please take time to share your experience concerning your physician office visit. These surveys are confidential and no health information about you is shared. We are eager to improve for you and we are counting on your feedback to help make that happen. ips to Help You Stop Smoking       Cigarette smoking is a preventable cause of death in the [place]. If you have thought about quitting but haven't been able to, here are some reasons why you should and some ways to do it. Here's Why   Quitting smoking now can decrease your risk of getting smoking-related illnesses like:   Heart disease   Stroke   Several types of cancer, including:   Lung   Mouth   Esophagus   Larynx   Bladder   Pancreas   Kidney   Chronic lung diseases:   Bronchitis   Emphysema   Asthma   Cataracts   Macular degeneration   Thyroid conditions   Hearing loss   Erectile dysfunction   Dementia   Osteoporosis   Here's How   Once you've decided to quit smoking, set your target quit date a few weeks away.  In the time leading up to your quit day, try some of these ideas offered by the [address] [place] [address] [employer] to help you successfully quit smoking. For the best results, work with your doctor. Together, you can test your lung function and compare the results to those of a nonsmoking person. The results can be given to you as your lung age. Finding out your lung age right after having the test done may help you to stop smoking. Your doctor can also discuss with you all of your options and refer you to smoking-cessation support groups. You may wish to use nicotine replacement (gum, patches, inhaler) or one of the prescription medications that have been shown to increase quit rates and prolong abstinence from smoking. But whatever you and your doctor decide on these matters, it will still be you who decides when an how to quit. Here are some techniques:   Switch Brands   Switch to a brand you find distasteful. Change to a brand that is low in tar and nicotine a couple of weeks before your target quit date. This will help change your smoking behavior. However, do not smoke more cigarettes, inhale them more often or more deeply, or place your fingertips over the holes in the filters. All of these actions will increase your nicotine intake, and the idea is to get your body used to functioning without nicotine. Cut Down the Number of Cigarettes You Smoke   Smoke only half of each cigarette. Each day, postpone the lighting of your first cigarette by one hour. Decide you'll only smoke during odd or even hours of the day. Decide beforehand how many cigarettes you'll smoke during the day. For each additional cigarette, give a dollar to your favorite genoveva. Change your eating habits to help you cut down. For example, drink milk, which many people consider incompatible with smoking. End meals or snacks with something that won't lead to a cigarette. Reach for a glass of juice instead of a cigarette for a \"pick-me-up. \"   Remember: Cutting down can help you quit, but it's not a substitute for quitting.  If you're down to about seven cigarettes a day, it's time to set your target quit date, and get ready to stick to it. Don't Smoke \"Automatically\"   Smoke only those cigarettes you really want. Catch yourself before you light up a cigarette out of pure habit. Don't empty your ashtrays. This will remind you of how many cigarettes you've smoked each day, and the sight and the smell of stale cigarettes butts will be very unpleasant. Make yourself aware of each cigarette by using the opposite hand or putting cigarettes in an unfamiliar location or a different pocket to break the automatic reach. If you light up many times during the day without even thinking about it, try to look in a mirror each time you put a match to your cigarette. You may decide you don't need it. Make Smoking Inconvenient   Stop buying cigarettes by the carton. Wait until one pack is empty before you buy another. Stop carrying cigarettes with you at home or at work. Make them difficult to get to. Make Smoking Unpleasant   Smoke only under circumstances that aren't especially pleasurable for you. If you like to smoke with others, smoke alone. Turn your chair to an empty corner and focus only on the cigarette you are smoking and all its many negative effects. Collect all your cigarette butts in one large glass container as a visual reminder of the filth made by smoking. Just Before Quitting   Practice going without cigarettes. Don't think of never smoking again. Think of quitting in terms of one day at a time . Tell yourself you won't smoke today, and then don't. Clean your clothes to rid them of the cigarette smell, which can linger a long time. On the Day You Quit   Throw away all your cigarettes and matches. Hide your lighters and ashtrays. Visit the dentist and have your teeth cleaned to get rid of tobacco stains. Notice how nice they look and resolve to keep them that way.    Make a list of things you'd like to buy for yourself or someone else. Estimate the cost in terms of packs of cigarettes, and put the money aside to buy these presents. Keep very busy on the big day. Go to the movies, exercise, take long walks, or go bike riding. Remind your family and friends that this is your quit date, and ask them to help you over the rough spots of the first couple of days and weeks. Buy yourself a treat or do something special to celebrate. Telephone and Internet Support   Telephone, web-, and computer-based programs can offer you the support that you need to quit and to stay smoke-free. You can find many programs online, like the [employer]'s [place] from Smoking . Immediately After Quitting   Develop a clean, fresh, nonsmoking environment around yourselfat work and at home. Buy yourself flowersyou may be surprised how much you can enjoy their scent now. The first few days after you quit, spend as much free time as possible in places where smoking isn't allowed, such as [address] [place] [address], museums, theaters, department stores, and churches. Drink large quantities of water and fruit juice (but avoid sodas that contain caffeine). Try to avoid alcohol, coffee, and other beverages that you associate with cigarette smoking. Strike up conversation instead of a match for a cigarette. If you miss the sensation of having a cigarette in your hand, play with something elsea pencil, a paper clip, a marble. If you miss having something in your mouth, try toothpicks or a fake cigarette.

## 2019-06-02 NOTE — PROGRESS NOTES
SUBJECTIVE:    Patient ID: Napoleon Markham is a 79 y.o. male. Medicalhistory Review  Past Medical, Family, and Social History reviewed and does contribute to the patient presenting condition    Health Maintenance Due   Topic Date Due    AAA screen  1950    Hepatitis C screen  1950    Shingles Vaccine (1 of 2) 06/07/2002    Colon cancer screen colonoscopy  06/07/2002    Pneumococcal 65+ years Vaccine (1 of 2 - PCV13) 06/07/2017       HPI:   Chief Complaint   Patient presents with   [address]     Patient here today for a new patient appointment. He states he has a lot of back problems. He is a former patient of Romayne Dare. He has chronic back pain and anxiety. He would like a refill on his pain medication and Xanax. Patient's medications, allergies, pastmedical, surgical, social and family histories were reviewed and updated as appropriate. Review of Systems Reviewed and acurate. See MA note. OBJECTIVE:    /80 (Site: Right Upper Arm, Position: Sitting, Cuff Size: Medium Adult)   Pulse 82   Ht 5' 9\" (1.753 m)   Wt 185 lb (83.9 kg)   SpO2 98%   BMI 27.32 kg/m²      Physical Exam   Constitutional: He is oriented to person, place, and time. He appears well-developed and well-nourished. No distress. HENT:   Head: Normocephalic. Right Ear: Tympanic membrane normal.   Left Ear: Tympanic membrane normal.   Mouth/Throat: No oropharyngeal exudate. Eyes: Lids are normal.   Neck: Neck supple. Cardiovascular: Normal rate, regular rhythm and normal heart sounds. Pulmonary/Chest: Effort normal. He has wheezes. Mild inspiratory wheezing   Abdominal: Soft. Bowel sounds are normal. He exhibits no distension. There is no tenderness. Musculoskeletal: He exhibits no edema. Lumbar back: He exhibits decreased range of motion and pain. Lymphadenopathy:     He has no cervical adenopathy. Neurological: He is alert and oriented to person, place, and time. Take 1 tablet by mouth 2 times daily as needed  . Historical Provider, MD   ALPRAZolam Darlen Lobe) 0.5 MG tablet Take 0.5 mg by mouth 2 times daily as needed for Sleep or Anxiety   Historical Provider, MD   calcium-vitamin D (OSCAL-500) 500-200 MG-UNIT per tablet Take 1 tablet by mouth daily. Indications: calcium 500 with vit D 125mg  Historical Provider, MD       ASSESSMENT:  1. Chronic low back pain, unspecified back pain laterality, with sciatica presence unspecified          PLAN:      Orders Placed This Encounter   Procedures    External Referral To Pain Clinic     Patient Instructions   · Keep a list of your medicines with you. List all of the prescription medicines, nonprescription medicines, supplements, natural remedies, and vitamins that you take. Tell your healthcare providers who treat you about all of the products you are taking. Your provider can provide you with a form to keep track of them. Just ask. · Follow the directions that come with your medicine, including information about food or alcohol. Make sure you know how and when to take your medicine. Do not take more or less than you are supposed to take. · Keep all medicines out of the reach of children. · Store medicines according to the directions on the label. · Monitor yourself. Learn to know how your body reacts to your new medicine and keep track of how it makes you feel before attempting (If your provider has allowed you to do so) to drive or go to work. · Seek emergency medical attention if you think you have used too much of this medicine. An overdose of any prescription medicine can be fatal. Overdose symptoms may include extreme drowsiness, muscle weakness, confusion, cold and clammy skin, pinpoint pupils, shallow breathing, slow heart rate, fainting, or coma. · Don't share prescription medicines with others, even when they seem to have the same symptoms. What may be good for you may be harmful to others.   · If you are no longer taking a prescribed medication and you have pills left please take your pills out of their original containers. Mix crushed pills with an undesirable substance, such as cat litter or used coffee grounds. Put the mixture into a disposable container with a lid, such as an empty margarine tub, or into a sealable bag. Cover up or remove any of your personal information on the empty containers by covering it with black permanent marker or duct tape. Place the sealed container with the mixture, and the empty drug containers, in the trash. · If you use a medication that is in the form of a patch, dispose of used patches by folding them in half so that the sticky sides meet, and then flushing them down a toilet. They should not be placed in the household trash where children or pets can find them. · If you have any questions, ask your provider or pharmacist for more information. · Be sure to keep all appointments for provider visits or tests. We are committed to providing you with the best care possible. In order to help us achieve these goals please remember to bring all medications, herbal products, and over the counter supplements with you to each visit. If your provider has ordered testing for you, please be sure to follow up with our office if you have not received results within 7 days after the testing took place. *If you receive a survey after visiting one of our offices, please take time to share your experience concerning your physician office visit. These surveys are confidential and no health information about you is shared. We are eager to improve for you and we are counting on your feedback to help make that happen. ips to Help You Stop Smoking       Cigarette smoking is a preventable cause of death in the [place]. If you have thought about quitting but haven't been able to, here are some reasons why you should and some ways to do it.    Here's Why   Quitting smoking now can decrease your risk of getting smoking-related illnesses like:   Heart disease   Stroke   Several types of cancer, including:   Lung   Mouth   Esophagus   Larynx   Bladder   Pancreas   Kidney   Chronic lung diseases:   Bronchitis   Emphysema   Asthma   Cataracts   Macular degeneration   Thyroid conditions   Hearing loss   Erectile dysfunction   Dementia   Osteoporosis   Here's How   Once you've decided to quit smoking, set your target quit date a few weeks away. In the time leading up to your quit day, try some of these ideas offered by the [address] to help you successfully quit smoking. For the best results, work with your doctor. Together, you can test your lung function and compare the results to those of a nonsmoking person. The results can be given to you as your lung age. Finding out your lung age right after having the test done may help you to stop smoking. Your doctor can also discuss with you all of your options and refer you to smoking-cessation support groups. You may wish to use nicotine replacement (gum, patches, inhaler) or one of the prescription medications that have been shown to increase quit rates and prolong abstinence from smoking. But whatever you and your doctor decide on these matters, it will still be you who decides when an how to quit. Here are some techniques:   Switch Brands   Switch to a brand you find distasteful. Change to a brand that is low in tar and nicotine a couple of weeks before your target quit date. This will help change your smoking behavior. However, do not smoke more cigarettes, inhale them more often or more deeply, or place your fingertips over the holes in the filters. All of these actions will increase your nicotine intake, and the idea is to get your body used to functioning without nicotine. Cut Down the Number of Cigarettes You Smoke   Smoke only half of each cigarette.    Each day, postpone the lighting of your first cigarette by one hour. Decide you'll only smoke during odd or even hours of the day. Decide beforehand how many cigarettes you'll smoke during the day. For each additional cigarette, give a dollar to your favorite genoveva. Change your eating habits to help you cut down. For example, drink milk, which many people consider incompatible with smoking. End meals or snacks with something that won't lead to a cigarette. Reach for a glass of juice instead of a cigarette for a \"pick-me-up. \"   Remember: Cutting down can help you quit, but it's not a substitute for quitting. If you're down to about seven cigarettes a day, it's time to set your target quit date, and get ready to stick to it. Don't Smoke \"Automatically\"   Smoke only those cigarettes you really want. Catch yourself before you light up a cigarette out of pure habit. Don't empty your ashtrays. This will remind you of how many cigarettes you've smoked each day, and the sight and the smell of stale cigarettes butts will be very unpleasant. Make yourself aware of each cigarette by using the opposite hand or putting cigarettes in an unfamiliar location or a different pocket to break the automatic reach. If you light up many times during the day without even thinking about it, try to look in a mirror each time you put a match to your cigarette. You may decide you don't need it. Make Smoking Inconvenient   Stop buying cigarettes by the carton. Wait until one pack is empty before you buy another. Stop carrying cigarettes with you at home or at work. Make them difficult to get to. Make Smoking Unpleasant   Smoke only under circumstances that aren't especially pleasurable for you. If you like to smoke with others, smoke alone. Turn your chair to an empty corner and focus only on the cigarette you are smoking and all its many negative effects.    Collect all your cigarette butts in one large glass container as a visual reminder of the filth made by smoking. Just Before Quitting   Practice going without cigarettes. Don't think of never smoking again. Think of quitting in terms of one day at a time . Tell yourself you won't smoke today, and then don't. Clean your clothes to rid them of the cigarette smell, which can linger a long time. On the Day You Quit   Throw away all your cigarettes and matches. Hide your lighters and ashtrays. Visit the dentist and have your teeth cleaned to get rid of tobacco stains. Notice how nice they look and resolve to keep them that way. Make a list of things you'd like to buy for yourself or someone else. Estimate the cost in terms of packs of cigarettes, and put the money aside to buy these presents. Keep very busy on the big day. Go to the movies, exercise, take long walks, or go bike riding. Remind your family and friends that this is your quit date, and ask them to help you over the rough spots of the first couple of days and weeks. Buy yourself a treat or do something special to celebrate. Telephone and Internet Support   Telephone, web-, and computer-based programs can offer you the support that you need to quit and to stay smoke-free. You can find many programs online, like the [employer] Association's [place] from Smoking . Immediately After Quitting   Develop a clean, fresh, nonsmoking environment around yourselfat work and at home. Buy yourself flowersyou may be surprised how much you can enjoy their scent now. The first few days after you quit, spend as much free time as possible in places where smoking isn't allowed, such as [address] [place] [address], museums, theaters, department stores, and churches. Drink large quantities of water and fruit juice (but avoid sodas that contain caffeine). Try to avoid alcohol, coffee, and other beverages that you associate with cigarette smoking. Strike up conversation instead of a match for a cigarette.    If you miss the sensation of having a cigarette in your hand, play with something elsea pencil, a paper clip, a marble. If you miss having something in your mouth, try toothpicks or a fake cigarette. Return in about 3 months (around 8/23/2019). Will send for records.

## 2019-07-02 RX ORDER — OMEPRAZOLE 20 MG/1
20 CAPSULE, DELAYED RELEASE ORAL DAILY
Qty: 30 CAPSULE | Refills: 3 | Status: SHIPPED | OUTPATIENT
Start: 2019-07-02 | End: 2020-03-19 | Stop reason: SDUPTHER

## 2019-07-02 RX ORDER — NAPROXEN 500 MG/1
500 TABLET ORAL 2 TIMES DAILY PRN
Qty: 60 TABLET | Refills: 3 | Status: SHIPPED | OUTPATIENT
Start: 2019-07-02 | End: 2019-11-22 | Stop reason: SDUPTHER

## 2019-08-01 ENCOUNTER — TELEPHONE (OUTPATIENT)
Dept: PRIMARY CARE CLINIC | Age: 69
End: 2019-08-01

## 2019-08-01 RX ORDER — ALBUTEROL SULFATE 90 UG/1
2 AEROSOL, METERED RESPIRATORY (INHALATION) EVERY 6 HOURS PRN
Qty: 1 INHALER | Refills: 5 | Status: SHIPPED | OUTPATIENT
Start: 2019-08-01 | End: 2020-03-19 | Stop reason: SDUPTHER

## 2019-08-01 RX ORDER — OYSTER SHELL CALCIUM WITH VITAMIN D 500; 200 MG/1; [IU]/1
1 TABLET, FILM COATED ORAL DAILY
Qty: 30 TABLET | Refills: 5 | Status: SHIPPED | OUTPATIENT
Start: 2019-08-01 | End: 2020-03-19 | Stop reason: SDUPTHER

## 2019-08-01 RX ORDER — METOPROLOL SUCCINATE 25 MG/1
25 TABLET, EXTENDED RELEASE ORAL NIGHTLY
Qty: 30 TABLET | Refills: 5 | Status: SHIPPED | OUTPATIENT
Start: 2019-08-01 | End: 2020-03-19 | Stop reason: SDUPTHER

## 2019-08-01 RX ORDER — BUDESONIDE AND FORMOTEROL FUMARATE DIHYDRATE 160; 4.5 UG/1; UG/1
2 AEROSOL RESPIRATORY (INHALATION) 2 TIMES DAILY PRN
Qty: 1 INHALER | Refills: 5 | Status: SHIPPED | OUTPATIENT
Start: 2019-08-01 | End: 2020-03-19 | Stop reason: SDUPTHER

## 2019-08-01 RX ORDER — UBIDECARENONE 75 MG
500 CAPSULE ORAL DAILY
Qty: 30 TABLET | Refills: 5 | Status: SHIPPED
Start: 2019-08-01 | End: 2020-09-18 | Stop reason: SDUPTHER

## 2019-08-01 RX ORDER — DULOXETIN HYDROCHLORIDE 60 MG/1
60 CAPSULE, DELAYED RELEASE ORAL DAILY
Qty: 30 CAPSULE | Refills: 5 | Status: SHIPPED | OUTPATIENT
Start: 2019-08-01 | End: 2020-03-19 | Stop reason: SDUPTHER

## 2019-08-01 RX ORDER — CETIRIZINE HYDROCHLORIDE 10 MG/1
10 TABLET ORAL DAILY PRN
Qty: 30 TABLET | Refills: 5 | Status: SHIPPED | OUTPATIENT
Start: 2019-08-01 | End: 2020-03-19 | Stop reason: SDUPTHER

## 2019-08-01 RX ORDER — QUETIAPINE FUMARATE 200 MG/1
200 TABLET, FILM COATED ORAL 2 TIMES DAILY
Qty: 60 TABLET | Refills: 5 | Status: SHIPPED | OUTPATIENT
Start: 2019-08-01 | End: 2020-03-19 | Stop reason: SDUPTHER

## 2019-08-01 RX ORDER — FLUTICASONE PROPIONATE 50 MCG
1 SPRAY, SUSPENSION (ML) NASAL DAILY PRN
Qty: 1 BOTTLE | Refills: 5 | Status: SHIPPED | OUTPATIENT
Start: 2019-08-01 | End: 2020-03-19 | Stop reason: SDUPTHER

## 2019-09-19 ENCOUNTER — OFFICE VISIT (OUTPATIENT)
Dept: PRIMARY CARE CLINIC | Age: 69
End: 2019-09-19
Payer: MEDICARE

## 2019-09-19 VITALS
HEART RATE: 92 BPM | WEIGHT: 183.6 LBS | BODY MASS INDEX: 27.11 KG/M2 | OXYGEN SATURATION: 97 % | DIASTOLIC BLOOD PRESSURE: 80 MMHG | SYSTOLIC BLOOD PRESSURE: 120 MMHG

## 2019-09-19 DIAGNOSIS — E53.8 B12 DEFICIENCY: ICD-10-CM

## 2019-09-19 DIAGNOSIS — J44.9 CHRONIC OBSTRUCTIVE PULMONARY DISEASE, UNSPECIFIED COPD TYPE (HCC): Primary | ICD-10-CM

## 2019-09-19 DIAGNOSIS — I10 ESSENTIAL HYPERTENSION: ICD-10-CM

## 2019-09-19 DIAGNOSIS — E55.9 VITAMIN D DEFICIENCY: ICD-10-CM

## 2019-09-19 DIAGNOSIS — E78.5 HYPERLIPIDEMIA, UNSPECIFIED HYPERLIPIDEMIA TYPE: ICD-10-CM

## 2019-09-19 DIAGNOSIS — F41.9 ANXIETY: ICD-10-CM

## 2019-09-19 PROCEDURE — 3017F COLORECTAL CA SCREEN DOC REV: CPT | Performed by: NURSE PRACTITIONER

## 2019-09-19 PROCEDURE — G8419 CALC BMI OUT NRM PARAM NOF/U: HCPCS | Performed by: NURSE PRACTITIONER

## 2019-09-19 PROCEDURE — 4040F PNEUMOC VAC/ADMIN/RCVD: CPT | Performed by: NURSE PRACTITIONER

## 2019-09-19 PROCEDURE — 1123F ACP DISCUSS/DSCN MKR DOCD: CPT | Performed by: NURSE PRACTITIONER

## 2019-09-19 PROCEDURE — G8427 DOCREV CUR MEDS BY ELIG CLIN: HCPCS | Performed by: NURSE PRACTITIONER

## 2019-09-19 PROCEDURE — 99214 OFFICE O/P EST MOD 30 MIN: CPT | Performed by: NURSE PRACTITIONER

## 2019-09-19 PROCEDURE — 4004F PT TOBACCO SCREEN RCVD TLK: CPT | Performed by: NURSE PRACTITIONER

## 2019-09-19 PROCEDURE — 3023F SPIROM DOC REV: CPT | Performed by: NURSE PRACTITIONER

## 2019-09-19 PROCEDURE — G8926 SPIRO NO PERF OR DOC: HCPCS | Performed by: NURSE PRACTITIONER

## 2019-09-19 RX ORDER — MULTIVIT-MIN/IRON FUM/FOLIC AC 7.5 MG-4
1 TABLET ORAL DAILY
Qty: 30 TABLET | Refills: 5 | Status: SHIPPED | OUTPATIENT
Start: 2019-09-19 | End: 2020-03-19 | Stop reason: SDUPTHER

## 2019-09-19 RX ORDER — ACETAMINOPHEN 160 MG
1 TABLET,DISINTEGRATING ORAL DAILY
Qty: 30 CAPSULE | Refills: 5 | Status: SHIPPED | OUTPATIENT
Start: 2019-09-19 | End: 2020-03-19 | Stop reason: SDUPTHER

## 2019-09-19 ASSESSMENT — ENCOUNTER SYMPTOMS
VOMITING: 0
COUGH: 0
SORE THROAT: 0
ABDOMINAL PAIN: 0
EYE PAIN: 0
NAUSEA: 0
BACK PAIN: 1
SHORTNESS OF BREATH: 0

## 2019-11-04 RX ORDER — NAPROXEN 500 MG/1
500 TABLET ORAL 2 TIMES DAILY PRN
Qty: 60 TABLET | Refills: 3 | OUTPATIENT
Start: 2019-11-04

## 2019-11-04 RX ORDER — OMEPRAZOLE 20 MG/1
20 CAPSULE, DELAYED RELEASE ORAL DAILY
Qty: 30 CAPSULE | Refills: 3 | OUTPATIENT
Start: 2019-11-04

## 2019-11-22 RX ORDER — NAPROXEN 500 MG/1
500 TABLET ORAL 2 TIMES DAILY PRN
Qty: 60 TABLET | Refills: 5 | Status: SHIPPED | OUTPATIENT
Start: 2019-11-22 | End: 2020-03-19 | Stop reason: SDUPTHER

## 2019-12-13 ENCOUNTER — HOSPITAL ENCOUNTER (OUTPATIENT)
Facility: HOSPITAL | Age: 69
Discharge: HOME | End: 2019-12-13
Payer: MEDICARE

## 2019-12-13 ENCOUNTER — OFFICE VISIT (OUTPATIENT)
Dept: PRIMARY CARE CLINIC | Age: 69
End: 2019-12-13
Payer: MEDICARE

## 2019-12-13 VITALS
OXYGEN SATURATION: 98 % | SYSTOLIC BLOOD PRESSURE: 120 MMHG | DIASTOLIC BLOOD PRESSURE: 80 MMHG | HEART RATE: 82 BPM | BODY MASS INDEX: 28.35 KG/M2 | WEIGHT: 192 LBS

## 2019-12-13 DIAGNOSIS — I10 ESSENTIAL HYPERTENSION: ICD-10-CM

## 2019-12-13 DIAGNOSIS — E78.5 HYPERLIPIDEMIA, UNSPECIFIED HYPERLIPIDEMIA TYPE: ICD-10-CM

## 2019-12-13 DIAGNOSIS — J44.9 CHRONIC OBSTRUCTIVE PULMONARY DISEASE, UNSPECIFIED COPD TYPE (HCC): Primary | ICD-10-CM

## 2019-12-13 DIAGNOSIS — E55.9 VITAMIN D DEFICIENCY: ICD-10-CM

## 2019-12-13 DIAGNOSIS — E53.8 B12 DEFICIENCY: ICD-10-CM

## 2019-12-13 DIAGNOSIS — J01.90 ACUTE SINUSITIS, RECURRENCE NOT SPECIFIED, UNSPECIFIED LOCATION: ICD-10-CM

## 2019-12-13 LAB
A/G RATIO: 1.5 (ref 0.8–2)
ALBUMIN SERPL-MCNC: 4.4 G/DL (ref 3.4–4.8)
ALP BLD-CCNC: 37 U/L (ref 25–100)
ALT SERPL-CCNC: 13 U/L (ref 4–36)
ANION GAP SERPL CALCULATED.3IONS-SCNC: 13 MMOL/L (ref 3–16)
AST SERPL-CCNC: 14 U/L (ref 8–33)
BASOPHILS ABSOLUTE: 0.1 K/UL (ref 0–0.1)
BASOPHILS RELATIVE PERCENT: 0.6 %
BILIRUB SERPL-MCNC: 0.5 MG/DL (ref 0.3–1.2)
BUN BLDV-MCNC: 10 MG/DL (ref 6–20)
CALCIUM SERPL-MCNC: 9.8 MG/DL (ref 8.5–10.5)
CHLORIDE BLD-SCNC: 98 MMOL/L (ref 98–107)
CHOLESTEROL, TOTAL: 160 MG/DL (ref 0–200)
CO2: 28 MMOL/L (ref 20–30)
CREAT SERPL-MCNC: 1.2 MG/DL (ref 0.4–1.2)
EOSINOPHILS ABSOLUTE: 0.3 K/UL (ref 0–0.4)
EOSINOPHILS RELATIVE PERCENT: 3.5 %
GFR AFRICAN AMERICAN: >59
GFR NON-AFRICAN AMERICAN: >59
GLOBULIN: 2.9 G/DL
GLUCOSE BLD-MCNC: 89 MG/DL (ref 74–106)
HCT VFR BLD CALC: 46.8 % (ref 40–54)
HDLC SERPL-MCNC: 53 MG/DL (ref 40–60)
HEMOGLOBIN: 14.5 G/DL (ref 13–18)
IMMATURE GRANULOCYTES #: 0.1 K/UL
IMMATURE GRANULOCYTES %: 0.5 % (ref 0–5)
LDL CHOLESTEROL CALCULATED: 87 MG/DL
LYMPHOCYTES ABSOLUTE: 1.8 K/UL (ref 1.5–4)
LYMPHOCYTES RELATIVE PERCENT: 18.8 %
MCH RBC QN AUTO: 31.9 PG (ref 27–32)
MCHC RBC AUTO-ENTMCNC: 31 G/DL (ref 31–35)
MCV RBC AUTO: 103.1 FL (ref 80–100)
MONOCYTES ABSOLUTE: 1 K/UL (ref 0.2–0.8)
MONOCYTES RELATIVE PERCENT: 11 %
NEUTROPHILS ABSOLUTE: 6.2 K/UL (ref 2–7.5)
NEUTROPHILS RELATIVE PERCENT: 65.6 %
PDW BLD-RTO: 13.6 % (ref 11–16)
PLATELET # BLD: 356 K/UL (ref 150–400)
PMV BLD AUTO: 10.4 FL (ref 6–10)
POTASSIUM SERPL-SCNC: 5.1 MMOL/L (ref 3.4–5.1)
RBC # BLD: 4.54 M/UL (ref 4.5–6)
SODIUM BLD-SCNC: 139 MMOL/L (ref 136–145)
TOTAL PROTEIN: 7.3 G/DL (ref 6.4–8.3)
TRIGL SERPL-MCNC: 102 MG/DL (ref 0–249)
VITAMIN B-12: 1229 PG/ML (ref 211–911)
VITAMIN D 25-HYDROXY: 46.4 (ref 32–100)
VLDLC SERPL CALC-MCNC: 20 MG/DL
WBC # BLD: 9.5 K/UL (ref 4–11)

## 2019-12-13 PROCEDURE — 4004F PT TOBACCO SCREEN RCVD TLK: CPT | Performed by: NURSE PRACTITIONER

## 2019-12-13 PROCEDURE — 99213 OFFICE O/P EST LOW 20 MIN: CPT | Performed by: NURSE PRACTITIONER

## 2019-12-13 PROCEDURE — 1123F ACP DISCUSS/DSCN MKR DOCD: CPT | Performed by: NURSE PRACTITIONER

## 2019-12-13 PROCEDURE — G8484 FLU IMMUNIZE NO ADMIN: HCPCS | Performed by: NURSE PRACTITIONER

## 2019-12-13 PROCEDURE — G8926 SPIRO NO PERF OR DOC: HCPCS | Performed by: NURSE PRACTITIONER

## 2019-12-13 PROCEDURE — 82607 VITAMIN B-12: CPT

## 2019-12-13 PROCEDURE — G8427 DOCREV CUR MEDS BY ELIG CLIN: HCPCS | Performed by: NURSE PRACTITIONER

## 2019-12-13 PROCEDURE — 80053 COMPREHEN METABOLIC PANEL: CPT

## 2019-12-13 PROCEDURE — 4040F PNEUMOC VAC/ADMIN/RCVD: CPT | Performed by: NURSE PRACTITIONER

## 2019-12-13 PROCEDURE — G8417 CALC BMI ABV UP PARAM F/U: HCPCS | Performed by: NURSE PRACTITIONER

## 2019-12-13 PROCEDURE — 80061 LIPID PANEL: CPT

## 2019-12-13 PROCEDURE — 85025 COMPLETE CBC W/AUTO DIFF WBC: CPT

## 2019-12-13 PROCEDURE — 3017F COLORECTAL CA SCREEN DOC REV: CPT | Performed by: NURSE PRACTITIONER

## 2019-12-13 PROCEDURE — 3023F SPIROM DOC REV: CPT | Performed by: NURSE PRACTITIONER

## 2019-12-13 PROCEDURE — 82306 VITAMIN D 25 HYDROXY: CPT

## 2019-12-13 RX ORDER — AZITHROMYCIN 250 MG/1
TABLET, FILM COATED ORAL
Qty: 6 TABLET | Refills: 0 | Status: SHIPPED | OUTPATIENT
Start: 2019-12-13 | End: 2019-12-23

## 2019-12-13 RX ORDER — METHOCARBAMOL 750 MG/1
750 TABLET, FILM COATED ORAL 3 TIMES DAILY PRN
Qty: 90 TABLET | Refills: 5 | Status: SHIPPED | OUTPATIENT
Start: 2019-12-13 | End: 2020-06-15 | Stop reason: SDUPTHER

## 2019-12-13 ASSESSMENT — ENCOUNTER SYMPTOMS
EYE PAIN: 0
NAUSEA: 0
VOMITING: 0
COUGH: 0
SHORTNESS OF BREATH: 0
ABDOMINAL PAIN: 0
SORE THROAT: 0

## 2019-12-17 ENCOUNTER — TELEPHONE (OUTPATIENT)
Dept: PRIMARY CARE CLINIC | Age: 69
End: 2019-12-17

## 2020-01-03 RX ORDER — CETIRIZINE HYDROCHLORIDE 10 MG/1
10 TABLET ORAL DAILY PRN
Qty: 30 TABLET | Refills: 5 | OUTPATIENT
Start: 2020-01-03

## 2020-01-31 RX ORDER — METOPROLOL SUCCINATE 25 MG/1
TABLET, EXTENDED RELEASE ORAL
Qty: 30 TABLET | Refills: 3 | OUTPATIENT
Start: 2020-01-31

## 2020-02-27 RX ORDER — METOPROLOL SUCCINATE 25 MG/1
TABLET, EXTENDED RELEASE ORAL
Qty: 30 TABLET | Refills: 3 | OUTPATIENT
Start: 2020-02-27

## 2020-03-19 ENCOUNTER — OFFICE VISIT (OUTPATIENT)
Dept: PRIMARY CARE CLINIC | Age: 70
End: 2020-03-19
Payer: MEDICARE

## 2020-03-19 VITALS
DIASTOLIC BLOOD PRESSURE: 80 MMHG | WEIGHT: 192 LBS | BODY MASS INDEX: 28.35 KG/M2 | OXYGEN SATURATION: 98 % | HEART RATE: 91 BPM | SYSTOLIC BLOOD PRESSURE: 130 MMHG

## 2020-03-19 PROCEDURE — G8484 FLU IMMUNIZE NO ADMIN: HCPCS | Performed by: NURSE PRACTITIONER

## 2020-03-19 PROCEDURE — G8427 DOCREV CUR MEDS BY ELIG CLIN: HCPCS | Performed by: NURSE PRACTITIONER

## 2020-03-19 PROCEDURE — 4004F PT TOBACCO SCREEN RCVD TLK: CPT | Performed by: NURSE PRACTITIONER

## 2020-03-19 PROCEDURE — 4040F PNEUMOC VAC/ADMIN/RCVD: CPT | Performed by: NURSE PRACTITIONER

## 2020-03-19 PROCEDURE — 3023F SPIROM DOC REV: CPT | Performed by: NURSE PRACTITIONER

## 2020-03-19 PROCEDURE — G8926 SPIRO NO PERF OR DOC: HCPCS | Performed by: NURSE PRACTITIONER

## 2020-03-19 PROCEDURE — G8417 CALC BMI ABV UP PARAM F/U: HCPCS | Performed by: NURSE PRACTITIONER

## 2020-03-19 PROCEDURE — 1123F ACP DISCUSS/DSCN MKR DOCD: CPT | Performed by: NURSE PRACTITIONER

## 2020-03-19 PROCEDURE — 99214 OFFICE O/P EST MOD 30 MIN: CPT | Performed by: NURSE PRACTITIONER

## 2020-03-19 PROCEDURE — 3017F COLORECTAL CA SCREEN DOC REV: CPT | Performed by: NURSE PRACTITIONER

## 2020-03-19 RX ORDER — QUETIAPINE FUMARATE 200 MG/1
200 TABLET, FILM COATED ORAL 2 TIMES DAILY
Qty: 60 TABLET | Refills: 5 | Status: SHIPPED | OUTPATIENT
Start: 2020-03-19 | End: 2020-08-31 | Stop reason: SDUPTHER

## 2020-03-19 RX ORDER — MULTIVIT-MIN/IRON FUM/FOLIC AC 7.5 MG-4
1 TABLET ORAL DAILY
Qty: 30 TABLET | Refills: 5 | Status: SHIPPED | OUTPATIENT
Start: 2020-03-19 | End: 2020-08-31 | Stop reason: SDUPTHER

## 2020-03-19 RX ORDER — METOPROLOL SUCCINATE 25 MG/1
25 TABLET, EXTENDED RELEASE ORAL NIGHTLY
Qty: 30 TABLET | Refills: 5 | Status: SHIPPED | OUTPATIENT
Start: 2020-03-19 | End: 2020-08-31 | Stop reason: SDUPTHER

## 2020-03-19 RX ORDER — GABAPENTIN 400 MG/1
800 CAPSULE ORAL 4 TIMES DAILY
Qty: 120 CAPSULE | Refills: 2 | Status: SHIPPED | OUTPATIENT
Start: 2020-03-19 | End: 2020-06-11 | Stop reason: SDUPTHER

## 2020-03-19 RX ORDER — FLUTICASONE PROPIONATE 50 MCG
1-2 SPRAY, SUSPENSION (ML) NASAL DAILY PRN
Qty: 1 BOTTLE | Refills: 5 | Status: SHIPPED | OUTPATIENT
Start: 2020-03-19 | End: 2020-08-31 | Stop reason: SDUPTHER

## 2020-03-19 RX ORDER — CETIRIZINE HYDROCHLORIDE 10 MG/1
10 TABLET ORAL DAILY PRN
Qty: 30 TABLET | Refills: 5 | Status: SHIPPED | OUTPATIENT
Start: 2020-03-19 | End: 2020-08-31 | Stop reason: SDUPTHER

## 2020-03-19 RX ORDER — ALBUTEROL SULFATE 90 UG/1
2 AEROSOL, METERED RESPIRATORY (INHALATION) EVERY 6 HOURS PRN
Qty: 1 INHALER | Refills: 5 | Status: SHIPPED | OUTPATIENT
Start: 2020-03-19 | End: 2020-08-31 | Stop reason: SDUPTHER

## 2020-03-19 RX ORDER — DULOXETIN HYDROCHLORIDE 60 MG/1
60 CAPSULE, DELAYED RELEASE ORAL 2 TIMES DAILY
Qty: 60 CAPSULE | Refills: 5 | Status: SHIPPED | OUTPATIENT
Start: 2020-03-19 | End: 2020-08-31 | Stop reason: SDUPTHER

## 2020-03-19 RX ORDER — BUDESONIDE AND FORMOTEROL FUMARATE DIHYDRATE 160; 4.5 UG/1; UG/1
2 AEROSOL RESPIRATORY (INHALATION) 2 TIMES DAILY PRN
Qty: 1 INHALER | Refills: 5 | Status: SHIPPED | OUTPATIENT
Start: 2020-03-19 | End: 2020-08-31 | Stop reason: SDUPTHER

## 2020-03-19 RX ORDER — OMEPRAZOLE 20 MG/1
20 CAPSULE, DELAYED RELEASE ORAL DAILY
Qty: 30 CAPSULE | Refills: 5 | Status: SHIPPED | OUTPATIENT
Start: 2020-03-19 | End: 2020-08-31 | Stop reason: SDUPTHER

## 2020-03-19 RX ORDER — UBIDECARENONE 75 MG
500 CAPSULE ORAL DAILY
Qty: 30 TABLET | Refills: 5 | Status: CANCELLED | OUTPATIENT
Start: 2020-03-19

## 2020-03-19 RX ORDER — NAPROXEN 500 MG/1
500 TABLET ORAL 2 TIMES DAILY PRN
Qty: 60 TABLET | Refills: 5 | Status: SHIPPED | OUTPATIENT
Start: 2020-03-19 | End: 2020-08-31 | Stop reason: SDUPTHER

## 2020-03-19 RX ORDER — ACETAMINOPHEN 160 MG
1 TABLET,DISINTEGRATING ORAL DAILY
Qty: 30 CAPSULE | Refills: 5 | Status: SHIPPED | OUTPATIENT
Start: 2020-03-19 | End: 2020-08-31 | Stop reason: SDUPTHER

## 2020-03-19 RX ORDER — OYSTER SHELL CALCIUM WITH VITAMIN D 500; 200 MG/1; [IU]/1
1 TABLET, FILM COATED ORAL DAILY
Qty: 30 TABLET | Refills: 5 | Status: SHIPPED | OUTPATIENT
Start: 2020-03-19 | End: 2020-08-31 | Stop reason: SDUPTHER

## 2020-03-19 ASSESSMENT — ENCOUNTER SYMPTOMS
VOMITING: 0
SORE THROAT: 0
EYE PAIN: 0
NAUSEA: 0
ABDOMINAL PAIN: 0
COUGH: 0
SHORTNESS OF BREATH: 0

## 2020-03-19 ASSESSMENT — PATIENT HEALTH QUESTIONNAIRE - PHQ9
1. LITTLE INTEREST OR PLEASURE IN DOING THINGS: 0
SUM OF ALL RESPONSES TO PHQ9 QUESTIONS 1 & 2: 0
2. FEELING DOWN, DEPRESSED OR HOPELESS: 0
SUM OF ALL RESPONSES TO PHQ QUESTIONS 1-9: 0
SUM OF ALL RESPONSES TO PHQ QUESTIONS 1-9: 0

## 2020-03-19 NOTE — PROGRESS NOTES
Chief Complaint   Patient presents with    COPD     Patient here today for a follow up. He states he has been anxoius, and nervous everyday recently. He also complains of his right shoulder bothering him and he feels like it is alseep. He states it just started bothering him. Have you seen any other physician or provider since your last visit? no    Have you had any other diagnostic tests since your last visit? no    Have you changed or stopped any medications since your last visit including any over-the-counter medicines, vitamins, or herbal medicines? no     Are you taking all your prescribed medications? Yes  If NO, why? -  N/A    I have recommended that this patient have a flu shot,pneumonia, colonscopy but he declines at this time. I have discussed the risks and benefits of this examination with him. The patient verbalizes understanding. REVIEW OF SYSTEMS:  Review of Systems   Constitutional: Negative for chills and fever. HENT: Negative for ear pain and sore throat. Eyes: Negative for pain and visual disturbance. Respiratory: Negative for cough and shortness of breath. Cardiovascular: Negative for chest pain, palpitations and leg swelling. Gastrointestinal: Negative for abdominal pain, nausea and vomiting. Genitourinary: Negative for dysuria and hematuria. Musculoskeletal: Negative for joint swelling. Right shoulder pain   Skin: Negative for rash. Neurological: Negative for dizziness and weakness. Psychiatric/Behavioral: Negative for sleep disturbance. The patient is nervous/anxious.

## 2020-03-31 NOTE — PROGRESS NOTES
SUBJECTIVE:    Patient ID: Taras Dickinson is a 81 y.o. male. Medical History Review  Past Medical, Family, and Social History reviewed and does contribute to the patient presenting condition    Health Maintenance Due   Topic Date Due    AAA screen  1950    Hepatitis C screen  1950    Shingles Vaccine (1 of 2) 06/07/2002    Colon cancer screen colonoscopy  06/07/2002    Pneumococcal 65+ years Vaccine (1 of 1 - PPSV23) 06/07/2017    Annual Wellness Visit (AWV)  05/29/2019    Flu vaccine (1) 09/01/2019       HPI:   Chief Complaint   Patient presents with    COPD     Patient here today for a follow up. He states he has been anxoius, and nervous everyday recently. He also complains of his right shoulder bothering him and he feels like it is alseep. He states it just started bothering him. He used to be on Gabapentin for pain, which also helped his anxiety. Patient's medications, allergies, past medical, surgical, social and family histories were reviewed and updated as appropriate. Review of Systems Reviewed and acurate. See MA note. OBJECTIVE:  /80   Pulse 91   Wt 192 lb (87.1 kg)   SpO2 98%   BMI 28.35 kg/m²    Physical Exam  Vitals signs reviewed. Constitutional:       General: He is not in acute distress. Appearance: He is well-developed. HENT:      Head: Normocephalic. Right Ear: Tympanic membrane normal.      Left Ear: Tympanic membrane normal.      Mouth/Throat:      Pharynx: No oropharyngeal exudate. Eyes:      General: Lids are normal.   Neck:      Musculoskeletal: Neck supple. Cardiovascular:      Rate and Rhythm: Normal rate and regular rhythm. Heart sounds: Normal heart sounds. Pulmonary:      Effort: Pulmonary effort is normal.      Breath sounds: Normal breath sounds. Abdominal:      General: Bowel sounds are normal. There is no distension. Palpations: Abdomen is soft. Tenderness: There is no abdominal tenderness. Musculoskeletal:      Right shoulder: He exhibits decreased range of motion and pain. Lymphadenopathy:      Cervical: No cervical adenopathy. Skin:     General: Skin is warm and dry. Neurological:      Mental Status: He is alert and oriented to person, place, and time. No results found for requested labs within last 30 days. Hemoglobin A1C (%)   Date Value   08/17/2015 5.6     Microscopic Examination (no units)   Date Value   01/17/2019 Not Indicated     LDL Calculated (mg/dL)   Date Value   12/13/2019 87       Lab Results   Component Value Date    WBC 9.5 12/13/2019    NEUTROABS 6.2 12/13/2019    HGB 14.5 12/13/2019    HCT 46.8 12/13/2019    .1 (H) 12/13/2019     12/13/2019     Lab Results   Component Value Date    TSH 1.25 10/25/2018       Prior to Visit Medications    Medication Sig Taking?  Authorizing Provider   Multiple Vitamins-Minerals (MULTIVITAMIN WITH MINERALS) tablet Take 1 tablet by mouth daily Yes DYLAN Ho   naproxen (NAPROSYN) 500 MG tablet Take 1 tablet by mouth 2 times daily as needed for Pain Yes DYLAN Ho   Cyanocobalamin (B-12) 2500 MCG SUBL Place 1 tablet under the tongue daily Yes DYLAN Ho   Cholecalciferol (VITAMIN D3) 50 MCG (2000 UT) CAPS Take 1 capsule by mouth daily Yes DYLAN Ho   albuterol sulfate  (90 Base) MCG/ACT inhaler Inhale 2 puffs into the lungs every 6 hours as needed for Wheezing Yes DYLAN Ho   QUEtiapine (SEROQUEL) 200 MG tablet Take 1 tablet by mouth 2 times daily Yes DYLAN Ho   DULoxetine (CYMBALTA) 60 MG extended release capsule Take 1 capsule by mouth 2 times daily Yes DYLAN Ho   fluticasone (FLONASE) 50 MCG/ACT nasal spray 1-2 sprays by Nasal route daily as needed for Rhinitis Yes DYLAN Ho   budesonide-formoterol (SYMBICORT) 160-4.5 MCG/ACT AERO Inhale 2 puffs into the lungs 2 times daily as needed (episodic wheezing) Yes Bernard Franco needed for Wheezing     Dispense:  1 Inhaler     Refill:  5    QUEtiapine (SEROQUEL) 200 MG tablet     Sig: Take 1 tablet by mouth 2 times daily     Dispense:  60 tablet     Refill:  5    DULoxetine (CYMBALTA) 60 MG extended release capsule     Sig: Take 1 capsule by mouth 2 times daily     Dispense:  60 capsule     Refill:  5    fluticasone (FLONASE) 50 MCG/ACT nasal spray     Si-2 sprays by Nasal route daily as needed for Rhinitis     Dispense:  1 Bottle     Refill:  5    budesonide-formoterol (SYMBICORT) 160-4.5 MCG/ACT AERO     Sig: Inhale 2 puffs into the lungs 2 times daily as needed (episodic wheezing)     Dispense:  1 Inhaler     Refill:  5    metoprolol succinate (TOPROL XL) 25 MG extended release tablet     Sig: Take 1 tablet by mouth nightly     Dispense:  30 tablet     Refill:  5    calcium-vitamin D (OSCAL-500) 500-200 MG-UNIT per tablet     Sig: Take 1 tablet by mouth daily Indications: calcium 500 with vit D 125mg     Dispense:  30 tablet     Refill:  5    cetirizine (ZYRTEC) 10 MG tablet     Sig: Take 1 tablet by mouth daily as needed for Allergies     Dispense:  30 tablet     Refill:  5    omeprazole (PRILOSEC) 20 MG delayed release capsule     Sig: Take 1 capsule by mouth Daily     Dispense:  30 capsule     Refill:  5    gabapentin (NEURONTIN) 400 MG capsule     Sig: Take 2 capsules by mouth 4 times daily for 30 days. Dispense:  120 capsule     Refill:  2      Return in about 6 months (around 2020). Fasting at f/u for labs.

## 2020-04-28 ENCOUNTER — TELEPHONE (OUTPATIENT)
Dept: PRIMARY CARE CLINIC | Age: 70
End: 2020-04-28

## 2020-05-29 RX ORDER — GABAPENTIN 400 MG/1
CAPSULE ORAL
Qty: 120 CAPSULE | Refills: 2 | OUTPATIENT
Start: 2020-05-29

## 2020-06-03 RX ORDER — METHOCARBAMOL 750 MG/1
750 TABLET, FILM COATED ORAL 3 TIMES DAILY PRN
Qty: 90 TABLET | Refills: 5 | OUTPATIENT
Start: 2020-06-03

## 2020-06-11 RX ORDER — GABAPENTIN 400 MG/1
800 CAPSULE ORAL 4 TIMES DAILY
Qty: 120 CAPSULE | Refills: 2 | Status: SHIPPED | OUTPATIENT
Start: 2020-06-11 | End: 2020-08-31 | Stop reason: SDUPTHER

## 2020-06-16 RX ORDER — METHOCARBAMOL 750 MG/1
750 TABLET, FILM COATED ORAL 3 TIMES DAILY PRN
Qty: 90 TABLET | Refills: 3 | Status: SHIPPED | OUTPATIENT
Start: 2020-06-16 | End: 2021-02-23 | Stop reason: ALTCHOICE

## 2020-08-20 RX ORDER — GABAPENTIN 400 MG/1
800 CAPSULE ORAL 4 TIMES DAILY
Qty: 120 CAPSULE | Refills: 2 | OUTPATIENT
Start: 2020-08-20

## 2020-08-21 RX ORDER — QUETIAPINE FUMARATE 200 MG/1
TABLET, FILM COATED ORAL
Qty: 60 TABLET | Refills: 5 | OUTPATIENT
Start: 2020-08-21

## 2020-08-24 RX ORDER — B-COMPLEX WITH VITAMIN C
TABLET ORAL
Qty: 30 TABLET | Refills: 5 | OUTPATIENT
Start: 2020-08-24

## 2020-08-24 RX ORDER — METOPROLOL SUCCINATE 25 MG/1
TABLET, EXTENDED RELEASE ORAL
Qty: 30 TABLET | Refills: 5 | OUTPATIENT
Start: 2020-08-24

## 2020-08-24 RX ORDER — NAPROXEN 500 MG/1
TABLET ORAL
Qty: 60 TABLET | Refills: 5 | OUTPATIENT
Start: 2020-08-24

## 2020-08-24 RX ORDER — CHOLECALCIFEROL (VITAMIN D3) 50 MCG
CAPSULE ORAL
Qty: 30 CAPSULE | Refills: 5 | OUTPATIENT
Start: 2020-08-24

## 2020-08-31 RX ORDER — CETIRIZINE HYDROCHLORIDE 10 MG/1
10 TABLET ORAL DAILY PRN
Qty: 30 TABLET | Refills: 5 | Status: SHIPPED | OUTPATIENT
Start: 2020-08-31 | End: 2020-09-18 | Stop reason: SDUPTHER

## 2020-08-31 RX ORDER — QUETIAPINE FUMARATE 200 MG/1
200 TABLET, FILM COATED ORAL 2 TIMES DAILY
Qty: 60 TABLET | Refills: 5 | Status: SHIPPED | OUTPATIENT
Start: 2020-08-31 | End: 2020-09-18 | Stop reason: SDUPTHER

## 2020-08-31 RX ORDER — OMEPRAZOLE 20 MG/1
20 CAPSULE, DELAYED RELEASE ORAL DAILY
Qty: 30 CAPSULE | Refills: 5 | Status: SHIPPED | OUTPATIENT
Start: 2020-08-31 | End: 2021-06-04 | Stop reason: SDUPTHER

## 2020-08-31 RX ORDER — MULTIVIT-MIN/IRON FUM/FOLIC AC 7.5 MG-4
1 TABLET ORAL DAILY
Qty: 30 TABLET | Refills: 5 | Status: SHIPPED | OUTPATIENT
Start: 2020-08-31 | End: 2020-09-18 | Stop reason: SDUPTHER

## 2020-08-31 RX ORDER — ALBUTEROL SULFATE 90 UG/1
2 AEROSOL, METERED RESPIRATORY (INHALATION) EVERY 6 HOURS PRN
Qty: 1 INHALER | Refills: 5 | Status: ON HOLD | OUTPATIENT
Start: 2020-08-31 | End: 2021-10-14

## 2020-08-31 RX ORDER — GABAPENTIN 400 MG/1
800 CAPSULE ORAL 4 TIMES DAILY
Qty: 120 CAPSULE | Refills: 2 | Status: SHIPPED | OUTPATIENT
Start: 2020-08-31 | End: 2020-12-08 | Stop reason: SDUPTHER

## 2020-08-31 RX ORDER — METOPROLOL SUCCINATE 25 MG/1
25 TABLET, EXTENDED RELEASE ORAL NIGHTLY
Qty: 30 TABLET | Refills: 5 | Status: SHIPPED | OUTPATIENT
Start: 2020-08-31 | End: 2020-09-18 | Stop reason: SDUPTHER

## 2020-08-31 RX ORDER — DULOXETIN HYDROCHLORIDE 60 MG/1
60 CAPSULE, DELAYED RELEASE ORAL 2 TIMES DAILY
Qty: 60 CAPSULE | Refills: 5 | Status: SHIPPED | OUTPATIENT
Start: 2020-08-31

## 2020-08-31 RX ORDER — OYSTER SHELL CALCIUM WITH VITAMIN D 500; 200 MG/1; [IU]/1
1 TABLET, FILM COATED ORAL DAILY
Qty: 30 TABLET | Refills: 5 | Status: SHIPPED | OUTPATIENT
Start: 2020-08-31 | End: 2021-03-18

## 2020-08-31 RX ORDER — NAPROXEN 500 MG/1
500 TABLET ORAL 2 TIMES DAILY PRN
Qty: 60 TABLET | Refills: 5 | Status: SHIPPED | OUTPATIENT
Start: 2020-08-31 | End: 2021-09-24

## 2020-08-31 RX ORDER — BUDESONIDE AND FORMOTEROL FUMARATE DIHYDRATE 160; 4.5 UG/1; UG/1
2 AEROSOL RESPIRATORY (INHALATION) 2 TIMES DAILY PRN
Qty: 1 INHALER | Refills: 5 | Status: ON HOLD | OUTPATIENT
Start: 2020-08-31 | End: 2021-10-14

## 2020-08-31 RX ORDER — ACETAMINOPHEN 160 MG
1 TABLET,DISINTEGRATING ORAL DAILY
Qty: 30 CAPSULE | Refills: 5 | Status: SHIPPED | OUTPATIENT
Start: 2020-08-31 | End: 2020-09-18 | Stop reason: SDUPTHER

## 2020-08-31 RX ORDER — FLUTICASONE PROPIONATE 50 MCG
1-2 SPRAY, SUSPENSION (ML) NASAL DAILY PRN
Qty: 1 BOTTLE | Refills: 5 | Status: SHIPPED | OUTPATIENT
Start: 2020-08-31

## 2020-09-03 RX ORDER — CHOLECALCIFEROL (VITAMIN D3) 50 MCG
CAPSULE ORAL
Qty: 30 CAPSULE | Refills: 5 | OUTPATIENT
Start: 2020-09-03

## 2020-09-06 ENCOUNTER — APPOINTMENT (OUTPATIENT)
Dept: CT IMAGING | Facility: HOSPITAL | Age: 70
End: 2020-09-06
Payer: MEDICARE

## 2020-09-06 ENCOUNTER — HOSPITAL ENCOUNTER (EMERGENCY)
Facility: HOSPITAL | Age: 70
Discharge: HOME | End: 2020-09-07
Attending: EMERGENCY MEDICINE
Payer: MEDICARE

## 2020-09-06 ENCOUNTER — APPOINTMENT (OUTPATIENT)
Dept: GENERAL RADIOLOGY | Facility: HOSPITAL | Age: 70
End: 2020-09-06
Payer: MEDICARE

## 2020-09-06 LAB
A/G RATIO: 1.4 (ref 0.8–2)
ALBUMIN SERPL-MCNC: 4.7 G/DL (ref 3.4–4.8)
ALP BLD-CCNC: 36 U/L (ref 25–100)
ALT SERPL-CCNC: 17 U/L (ref 4–36)
AMPHETAMINE SCREEN, URINE: ABNORMAL
ANION GAP SERPL CALCULATED.3IONS-SCNC: 16 MMOL/L (ref 3–16)
AST SERPL-CCNC: 30 U/L (ref 8–33)
BARBITURATE SCREEN URINE: ABNORMAL
BASOPHILS ABSOLUTE: 0.1 K/UL (ref 0–0.1)
BASOPHILS RELATIVE PERCENT: 0.7 %
BENZODIAZEPINE SCREEN, URINE: ABNORMAL
BILIRUB SERPL-MCNC: 0.3 MG/DL (ref 0.3–1.2)
BILIRUBIN URINE: NEGATIVE
BLOOD, URINE: NEGATIVE
BUN BLDV-MCNC: 14 MG/DL (ref 6–20)
CALCIUM SERPL-MCNC: 9.5 MG/DL (ref 8.5–10.5)
CANNABINOID SCREEN URINE: ABNORMAL
CHLORIDE BLD-SCNC: 95 MMOL/L (ref 98–107)
CLARITY: CLEAR
CO2: 25 MMOL/L (ref 20–30)
COCAINE METABOLITE SCREEN URINE: ABNORMAL
COLOR: YELLOW
CREAT SERPL-MCNC: 1 MG/DL (ref 0.4–1.2)
D DIMER: 1.09 UG/ML FEU (ref 0–0.6)
EOSINOPHILS ABSOLUTE: 0.2 K/UL (ref 0–0.4)
EOSINOPHILS RELATIVE PERCENT: 1.4 %
ETHANOL: 311 MG/DL (ref 0–0.08)
GFR AFRICAN AMERICAN: >59
GFR NON-AFRICAN AMERICAN: >59
GLOBULIN: 3.4 G/DL
GLUCOSE BLD-MCNC: 88 MG/DL (ref 74–106)
GLUCOSE URINE: NEGATIVE MG/DL
HCT VFR BLD CALC: 47.3 % (ref 40–54)
HEMOGLOBIN: 15 G/DL (ref 13–18)
IMMATURE GRANULOCYTES #: 0.1 K/UL
IMMATURE GRANULOCYTES %: 0.6 % (ref 0–5)
KETONES, URINE: 15 MG/DL
LACTIC ACID: 4 MMOL/L (ref 0.4–2)
LEUKOCYTE ESTERASE, URINE: NEGATIVE
LIPASE: 48 U/L (ref 5.6–51.3)
LYMPHOCYTES ABSOLUTE: 2.5 K/UL (ref 1.5–4)
LYMPHOCYTES RELATIVE PERCENT: 23.5 %
Lab: ABNORMAL
MCH RBC QN AUTO: 29.9 PG (ref 27–32)
MCHC RBC AUTO-ENTMCNC: 31.7 G/DL (ref 31–35)
MCV RBC AUTO: 94.4 FL (ref 80–100)
METHADONE SCREEN, URINE: ABNORMAL
METHAMPHETAMINE, URINE: ABNORMAL
MICROSCOPIC EXAMINATION: ABNORMAL
MONOCYTES ABSOLUTE: 0.8 K/UL (ref 0.2–0.8)
MONOCYTES RELATIVE PERCENT: 7.4 %
NEUTROPHILS ABSOLUTE: 7.2 K/UL (ref 2–7.5)
NEUTROPHILS RELATIVE PERCENT: 66.4 %
NITRITE, URINE: NEGATIVE
OPIATE SCREEN URINE: ABNORMAL
PDW BLD-RTO: 15.4 % (ref 11–16)
PH UA: 6.5 (ref 5–8)
PHENCYCLIDINE SCREEN URINE: ABNORMAL
PLATELET # BLD: 428 K/UL (ref 150–400)
PMV BLD AUTO: 9.9 FL (ref 6–10)
POTASSIUM SERPL-SCNC: 4.1 MMOL/L (ref 3.4–5.1)
PRO-BNP: 48 PG/ML (ref 0–1800)
PROPOXYPHENE SCREEN, URINE: ABNORMAL
PROTEIN UA: NEGATIVE MG/DL
RBC # BLD: 5.01 M/UL (ref 4.5–6)
SODIUM BLD-SCNC: 136 MMOL/L (ref 136–145)
SPECIFIC GRAVITY UA: 1.01 (ref 1–1.03)
TOTAL PROTEIN: 8.1 G/DL (ref 6.4–8.3)
TRICYCLIC, URINE: POSITIVE
TROPONIN: <0.3 NG/ML
UR OXYCODONE RAPID SCREEN: ABNORMAL
URINE REFLEX TO CULTURE: ABNORMAL
URINE TYPE: ABNORMAL
UROBILINOGEN, URINE: 0.2 E.U./DL
WBC # BLD: 10.8 K/UL (ref 4–11)

## 2020-09-06 PROCEDURE — 96361 HYDRATE IV INFUSION ADD-ON: CPT

## 2020-09-06 PROCEDURE — 71045 X-RAY EXAM CHEST 1 VIEW: CPT

## 2020-09-06 PROCEDURE — 80307 DRUG TEST PRSMV CHEM ANLYZR: CPT

## 2020-09-06 PROCEDURE — 70450 CT HEAD/BRAIN W/O DYE: CPT

## 2020-09-06 PROCEDURE — 96372 THER/PROPH/DIAG INJ SC/IM: CPT

## 2020-09-06 PROCEDURE — 99284 EMERGENCY DEPT VISIT MOD MDM: CPT

## 2020-09-06 PROCEDURE — 83605 ASSAY OF LACTIC ACID: CPT

## 2020-09-06 PROCEDURE — 36415 COLL VENOUS BLD VENIPUNCTURE: CPT

## 2020-09-06 PROCEDURE — 96375 TX/PRO/DX INJ NEW DRUG ADDON: CPT

## 2020-09-06 PROCEDURE — 84484 ASSAY OF TROPONIN QUANT: CPT

## 2020-09-06 PROCEDURE — 83880 ASSAY OF NATRIURETIC PEPTIDE: CPT

## 2020-09-06 PROCEDURE — 74176 CT ABD & PELVIS W/O CONTRAST: CPT

## 2020-09-06 PROCEDURE — 2580000003 HC RX 258: Performed by: EMERGENCY MEDICINE

## 2020-09-06 PROCEDURE — 51701 INSERT BLADDER CATHETER: CPT

## 2020-09-06 PROCEDURE — 85379 FIBRIN DEGRADATION QUANT: CPT

## 2020-09-06 PROCEDURE — 80053 COMPREHEN METABOLIC PANEL: CPT

## 2020-09-06 PROCEDURE — 99285 EMERGENCY DEPT VISIT HI MDM: CPT

## 2020-09-06 PROCEDURE — 81003 URINALYSIS AUTO W/O SCOPE: CPT

## 2020-09-06 PROCEDURE — G0480 DRUG TEST DEF 1-7 CLASSES: HCPCS

## 2020-09-06 PROCEDURE — 6360000002 HC RX W HCPCS: Performed by: EMERGENCY MEDICINE

## 2020-09-06 PROCEDURE — 85025 COMPLETE CBC W/AUTO DIFF WBC: CPT

## 2020-09-06 PROCEDURE — 96365 THER/PROPH/DIAG IV INF INIT: CPT

## 2020-09-06 PROCEDURE — 93005 ELECTROCARDIOGRAM TRACING: CPT

## 2020-09-06 PROCEDURE — 83690 ASSAY OF LIPASE: CPT

## 2020-09-06 RX ORDER — LORAZEPAM 2 MG/ML
1 INJECTION INTRAMUSCULAR ONCE
Status: COMPLETED | OUTPATIENT
Start: 2020-09-06 | End: 2020-09-06

## 2020-09-06 RX ORDER — ONDANSETRON 2 MG/ML
4 INJECTION INTRAMUSCULAR; INTRAVENOUS ONCE
Status: COMPLETED | OUTPATIENT
Start: 2020-09-06 | End: 2020-09-06

## 2020-09-06 RX ORDER — 0.9 % SODIUM CHLORIDE 0.9 %
1000 INTRAVENOUS SOLUTION INTRAVENOUS ONCE
Status: COMPLETED | OUTPATIENT
Start: 2020-09-06 | End: 2020-09-06

## 2020-09-06 RX ORDER — ZIPRASIDONE MESYLATE 20 MG/ML
20 INJECTION, POWDER, LYOPHILIZED, FOR SOLUTION INTRAMUSCULAR ONCE
Status: COMPLETED | OUTPATIENT
Start: 2020-09-06 | End: 2020-09-06

## 2020-09-06 RX ADMIN — WATER 1.2 ML: 1 INJECTION INTRAMUSCULAR; INTRAVENOUS; SUBCUTANEOUS at 22:43

## 2020-09-06 RX ADMIN — ZIPRASIDONE MESYLATE 10 MG: 20 INJECTION, POWDER, LYOPHILIZED, FOR SOLUTION INTRAMUSCULAR at 22:42

## 2020-09-06 RX ADMIN — LORAZEPAM 1 MG: 2 INJECTION INTRAMUSCULAR; INTRAVENOUS at 22:23

## 2020-09-06 RX ADMIN — SODIUM CHLORIDE 1000 ML: 9 INJECTION, SOLUTION INTRAVENOUS at 22:07

## 2020-09-06 RX ADMIN — ONDANSETRON 4 MG: 2 INJECTION, SOLUTION INTRAMUSCULAR; INTRAVENOUS at 21:35

## 2020-09-07 ENCOUNTER — APPOINTMENT (OUTPATIENT)
Dept: GENERAL RADIOLOGY | Facility: HOSPITAL | Age: 70
End: 2020-09-07
Payer: MEDICARE

## 2020-09-07 ENCOUNTER — APPOINTMENT (OUTPATIENT)
Dept: CT IMAGING | Facility: HOSPITAL | Age: 70
End: 2020-09-07
Payer: MEDICARE

## 2020-09-07 ENCOUNTER — HOSPITAL ENCOUNTER (EMERGENCY)
Facility: HOSPITAL | Age: 70
Discharge: HOME | End: 2020-09-07
Attending: HOSPITALIST
Payer: MEDICARE

## 2020-09-07 VITALS
HEIGHT: 69 IN | HEART RATE: 109 BPM | RESPIRATION RATE: 18 BRPM | SYSTOLIC BLOOD PRESSURE: 132 MMHG | OXYGEN SATURATION: 93 % | BODY MASS INDEX: 29.62 KG/M2 | WEIGHT: 200 LBS | TEMPERATURE: 99.1 F | DIASTOLIC BLOOD PRESSURE: 83 MMHG

## 2020-09-07 VITALS
BODY MASS INDEX: 29.62 KG/M2 | HEART RATE: 88 BPM | SYSTOLIC BLOOD PRESSURE: 127 MMHG | OXYGEN SATURATION: 97 % | DIASTOLIC BLOOD PRESSURE: 72 MMHG | RESPIRATION RATE: 15 BRPM | TEMPERATURE: 98.2 F | WEIGHT: 200 LBS | HEIGHT: 69 IN

## 2020-09-07 LAB
A/G RATIO: 1.4 (ref 0.8–2)
ACETAMINOPHEN LEVEL: <15 UG/ML (ref 10–30)
ALBUMIN SERPL-MCNC: 4.1 G/DL (ref 3.4–4.8)
ALP BLD-CCNC: 35 U/L (ref 25–100)
ALT SERPL-CCNC: 18 U/L (ref 4–36)
ANION GAP SERPL CALCULATED.3IONS-SCNC: 17 MMOL/L (ref 3–16)
AST SERPL-CCNC: 31 U/L (ref 8–33)
BASOPHILS ABSOLUTE: 0.1 K/UL (ref 0–0.1)
BASOPHILS RELATIVE PERCENT: 0.3 %
BILIRUB SERPL-MCNC: 0.4 MG/DL (ref 0.3–1.2)
BUN BLDV-MCNC: 13 MG/DL (ref 6–20)
CALCIUM SERPL-MCNC: 8.5 MG/DL (ref 8.5–10.5)
CHLORIDE BLD-SCNC: 98 MMOL/L (ref 98–107)
CHP ED QC CHECK: YES
CO2: 20 MMOL/L (ref 20–30)
CREAT SERPL-MCNC: 1 MG/DL (ref 0.4–1.2)
EOSINOPHILS ABSOLUTE: 0 K/UL (ref 0–0.4)
EOSINOPHILS RELATIVE PERCENT: 0.1 %
ETHANOL: 306 MG/DL (ref 0–0.08)
GFR AFRICAN AMERICAN: >59
GFR NON-AFRICAN AMERICAN: >59
GLOBULIN: 2.9 G/DL
GLUCOSE BLD-MCNC: 75 MG/DL (ref 74–106)
GLUCOSE BLD-MCNC: 82 MG/DL
GLUCOSE BLD-MCNC: 82 MG/DL (ref 74–106)
HCT VFR BLD CALC: 41.4 % (ref 40–54)
HEMOGLOBIN: 12.9 G/DL (ref 13–18)
IMMATURE GRANULOCYTES #: 0.1 K/UL
IMMATURE GRANULOCYTES %: 0.5 % (ref 0–5)
LACTIC ACID: 5.2 MMOL/L (ref 0.4–2)
LYMPHOCYTES ABSOLUTE: 1.7 K/UL (ref 1.5–4)
LYMPHOCYTES RELATIVE PERCENT: 11.4 %
MCH RBC QN AUTO: 30.1 PG (ref 27–32)
MCHC RBC AUTO-ENTMCNC: 31.2 G/DL (ref 31–35)
MCV RBC AUTO: 96.5 FL (ref 80–100)
MONOCYTES ABSOLUTE: 1 K/UL (ref 0.2–0.8)
MONOCYTES RELATIVE PERCENT: 6.5 %
NEUTROPHILS ABSOLUTE: 12.2 K/UL (ref 2–7.5)
NEUTROPHILS RELATIVE PERCENT: 81.2 %
PDW BLD-RTO: 15.4 % (ref 11–16)
PERFORMED ON: NORMAL
PLATELET # BLD: 357 K/UL (ref 150–400)
PMV BLD AUTO: 9.3 FL (ref 6–10)
POTASSIUM REFLEX MAGNESIUM: 4.3 MMOL/L (ref 3.4–5.1)
PRO-BNP: 93 PG/ML (ref 0–1800)
RAPID INFLUENZA  B AGN: NEGATIVE
RAPID INFLUENZA A AGN: NEGATIVE
RBC # BLD: 4.29 M/UL (ref 4.5–6)
SALICYLATE, SERUM: <0.3 MG/DL (ref 15–30)
SODIUM BLD-SCNC: 135 MMOL/L (ref 136–145)
TOTAL PROTEIN: 7 G/DL (ref 6.4–8.3)
TROPONIN: <0.3 NG/ML
WBC # BLD: 15 K/UL (ref 4–11)

## 2020-09-07 PROCEDURE — 6360000002 HC RX W HCPCS: Performed by: EMERGENCY MEDICINE

## 2020-09-07 PROCEDURE — 71045 X-RAY EXAM CHEST 1 VIEW: CPT

## 2020-09-07 PROCEDURE — 6370000000 HC RX 637 (ALT 250 FOR IP): Performed by: HOSPITALIST

## 2020-09-07 PROCEDURE — 2580000003 HC RX 258: Performed by: HOSPITALIST

## 2020-09-07 PROCEDURE — G0480 DRUG TEST DEF 1-7 CLASSES: HCPCS

## 2020-09-07 PROCEDURE — 99282 EMERGENCY DEPT VISIT SF MDM: CPT

## 2020-09-07 PROCEDURE — 84484 ASSAY OF TROPONIN QUANT: CPT

## 2020-09-07 PROCEDURE — 2580000003 HC RX 258: Performed by: EMERGENCY MEDICINE

## 2020-09-07 PROCEDURE — 94640 AIRWAY INHALATION TREATMENT: CPT

## 2020-09-07 PROCEDURE — 96360 HYDRATION IV INFUSION INIT: CPT

## 2020-09-07 PROCEDURE — 83605 ASSAY OF LACTIC ACID: CPT

## 2020-09-07 PROCEDURE — 36415 COLL VENOUS BLD VENIPUNCTURE: CPT

## 2020-09-07 PROCEDURE — 99283 EMERGENCY DEPT VISIT LOW MDM: CPT

## 2020-09-07 PROCEDURE — 85025 COMPLETE CBC W/AUTO DIFF WBC: CPT

## 2020-09-07 PROCEDURE — 6360000002 HC RX W HCPCS

## 2020-09-07 PROCEDURE — 71275 CT ANGIOGRAPHY CHEST: CPT

## 2020-09-07 PROCEDURE — 70496 CT ANGIOGRAPHY HEAD: CPT

## 2020-09-07 PROCEDURE — 2500000003 HC RX 250 WO HCPCS

## 2020-09-07 PROCEDURE — 87804 INFLUENZA ASSAY W/OPTIC: CPT

## 2020-09-07 PROCEDURE — 80053 COMPREHEN METABOLIC PANEL: CPT

## 2020-09-07 PROCEDURE — 83880 ASSAY OF NATRIURETIC PEPTIDE: CPT

## 2020-09-07 PROCEDURE — 2500000003 HC RX 250 WO HCPCS: Performed by: EMERGENCY MEDICINE

## 2020-09-07 PROCEDURE — 70498 CT ANGIOGRAPHY NECK: CPT

## 2020-09-07 PROCEDURE — 6360000004 HC RX CONTRAST MEDICATION: Performed by: EMERGENCY MEDICINE

## 2020-09-07 RX ORDER — THIAMINE HYDROCHLORIDE 100 MG/ML
INJECTION, SOLUTION INTRAMUSCULAR; INTRAVENOUS
Status: COMPLETED
Start: 2020-09-07 | End: 2020-09-07

## 2020-09-07 RX ORDER — SODIUM CHLORIDE 9 MG/ML
INJECTION, SOLUTION INTRAVENOUS CONTINUOUS
Status: DISCONTINUED | OUTPATIENT
Start: 2020-09-07 | End: 2020-09-07 | Stop reason: HOSPADM

## 2020-09-07 RX ORDER — FOLIC ACID 5 MG/ML
INJECTION, SOLUTION INTRAMUSCULAR; INTRAVENOUS; SUBCUTANEOUS
Status: COMPLETED
Start: 2020-09-07 | End: 2020-09-07

## 2020-09-07 RX ORDER — ASCORBIC ACID, VITAMIN A PALMITATE, CHOLECALCIFEROL, THIAMINE HYDROCHLORIDE, RIBOFLAVIN-5 PHOSPHATE SODIUM, PYRIDOXINE HYDROCHLORIDE, NIACINAMIDE, DEXPANTHENOL, ALPHA-TOCOPHEROL ACETATE, VITAMIN K1, FOLIC ACID, BIOTIN, CYANOCOBALAMIN 200; 3300; 200; 6; 3.6; 6; 40; 15; 10; 150; 600; 60; 5 MG/10ML; [IU]/10ML; [IU]/10ML; MG/10ML; MG/10ML; MG/10ML; MG/10ML; MG/10ML; [IU]/10ML; UG/10ML; UG/10ML; UG/10ML; UG/10ML
INJECTION, SOLUTION INTRAVENOUS
Status: COMPLETED
Start: 2020-09-07 | End: 2020-09-07

## 2020-09-07 RX ORDER — IPRATROPIUM BROMIDE AND ALBUTEROL SULFATE 2.5; .5 MG/3ML; MG/3ML
1 SOLUTION RESPIRATORY (INHALATION) ONCE
Status: COMPLETED | OUTPATIENT
Start: 2020-09-07 | End: 2020-09-07

## 2020-09-07 RX ADMIN — SODIUM CHLORIDE: 9 INJECTION, SOLUTION INTRAVENOUS at 15:21

## 2020-09-07 RX ADMIN — IPRATROPIUM BROMIDE AND ALBUTEROL SULFATE 1 AMPULE: .5; 3 SOLUTION RESPIRATORY (INHALATION) at 15:23

## 2020-09-07 RX ADMIN — THIAMINE HYDROCHLORIDE: 100 INJECTION, SOLUTION INTRAMUSCULAR; INTRAVENOUS at 02:22

## 2020-09-07 RX ADMIN — FOLIC ACID: 5 INJECTION, SOLUTION INTRAMUSCULAR; INTRAVENOUS; SUBCUTANEOUS at 02:22

## 2020-09-07 RX ADMIN — ASCORBIC ACID, VITAMIN A PALMITATE, CHOLECALCIFEROL, THIAMINE HYDROCHLORIDE, RIBOFLAVIN-5 PHOSPHATE SODIUM, PYRIDOXINE HYDROCHLORIDE, NIACINAMIDE, DEXPANTHENOL, ALPHA-TOCOPHEROL ACETATE, VITAMIN K1, FOLIC ACID, BIOTIN, CYANOCOBALAMIN: 200; 3300; 200; 6; 3.6; 6; 40; 15; 10; 150; 600; 60; 5 INJECTION, SOLUTION INTRAVENOUS at 02:21

## 2020-09-07 RX ADMIN — IOPAMIDOL 100 ML: 755 INJECTION, SOLUTION INTRAVENOUS at 00:46

## 2020-09-07 ASSESSMENT — PAIN DESCRIPTION - DESCRIPTORS: DESCRIPTORS: SHARP

## 2020-09-07 ASSESSMENT — PAIN DESCRIPTION - FREQUENCY: FREQUENCY: CONTINUOUS

## 2020-09-07 ASSESSMENT — PAIN DESCRIPTION - PAIN TYPE: TYPE: ACUTE PAIN

## 2020-09-07 ASSESSMENT — PAIN DESCRIPTION - LOCATION: LOCATION: BACK

## 2020-09-07 ASSESSMENT — PAIN SCALES - GENERAL: PAINLEVEL_OUTOF10: 10

## 2020-09-07 NOTE — ED NOTES
Attempted EKG. PT will not listen to instructions and deliberately ignores my instructions. EKG unobtainable at this time. RN and Physician advised. Will try again later.       Austen Wei  09/07/20 1500

## 2020-09-07 NOTE — ED NOTES
Pt at bedside to void using urinal, elana well, pt answered al  Question r/t name, , address, where he was, president, todays date, his phone number and familys phone number. Family arrived to transport pt home, pt gowned front and back r/t shorts were not found, shoes with pt.  Assisted out via [employer], [address] [facility]  [phone]

## 2020-09-07 NOTE — ED NOTES
Per respiratory therapy patient applied to 3L per nasal cannula.       Niles Herron RN  09/06/20 [id number]

## 2020-09-07 NOTE — ED NOTES
Report to vicki on medical unit     Dilma Luigi, [facility]  09/07/20 [id number]

## 2020-09-07 NOTE — ED NOTES
Message sent to dr Poon Shall r/t pt going home instead of admitting     Serene La RN  09/07/20 [id number]

## 2020-09-07 NOTE — ED NOTES
pts avs left at hospital, will call and advise can be picked up when they are able     Sreedhar Koroma RN  09/07/20 [id number]

## 2020-09-07 NOTE — ED PROVIDER NOTES
64  ENCOUNTER      Pt Name: Martha Moran  MRN: [id number]  [place]: 1950  Date of evaluation: 9/7/2020  Provider: Amalia Ashley, [facility]       Chief Complaint   Patient presents with    Alcohol Intoxication         HISTORY OF PRESENT ILLNESS  (Location/Symptom, Timing/Onset, Context/Setting, Quality, Duration, Modifying Factors, Severity.)   Martha Moran is a 78 y.o. male who presents to the emergency department for alcohol intoxication. Patient apparently was here last evening had extensive work-up performed which essentially come back as benign except for an elevated lactic acid and a elevated d-dimer which she had a negative CTA of the chest performed. Patient was a rather combative last evening and had to be sedated with a couple of doses of Geodon and Ativan. Patient then slept for almost the entire night here in the emergency department and upon awaking he was alert awake oriented x4 back to his normal self family come and pick the patient up. Patient was intoxicated with alcohol. Apparently patient states that once he got out of here he went back home began drinking again. Somebody drove by his house and seeing him laying in the yard and called for EMS. Patient's wife come outside did not even realize that the patient was out there and asked that was he done now. Otherwise the patient just states he does not feel very good and was brought here to the emergency department for evaluation for altered mental status again. FSBS 101  Nursing notes were reviewed.     REVIEW OFSYSTEMS    (2-9 systems for level 4, 10 or more for level 5)   ROS:  General:  No fevers, no chills, no weakness, +malaise  Cardiovascular:  No chest pain, no palpitations  Respiratory:  No shortness of breath, + cough, no wheezing  Gastrointestinal:  No pain, no nausea, no vomiting, no diarrhea  Musculoskeletal:  No muscle pain, no joint pain  Skin: mouth 3 times daily as needed (muscle spasms)    METOPROLOL SUCCINATE (TOPROL XL) 25 MG EXTENDED RELEASE TABLET    Take 1 tablet by mouth nightly    MULTIPLE VITAMINS-MINERALS (MULTIVITAMIN WITH MINERALS) TABLET    Take 1 tablet by mouth daily    NAPROXEN (NAPROSYN) 500 MG TABLET    Take 1 tablet by mouth 2 times daily as needed for Pain    OMEPRAZOLE (PRILOSEC) 20 MG DELAYED RELEASE CAPSULE    Take 1 capsule by mouth Daily    QUETIAPINE (SEROQUEL) 200 MG TABLET    Take 1 tablet by mouth 2 times daily    VITAMIN B-12 (CYANOCOBALAMIN) 100 MCG TABLET    Take 5 tablets by mouth daily Patient not sure of exact dose, doesn't take every day. ALLERGIES     Patient has no known allergies. FAMILY HISTORY     History reviewed. No pertinent family history.        SOCIAL HISTORY       Social History     Socioeconomic History    Marital status:      Spouse name: None    Number of children: None    Years of education: None    Highest education level: None   Occupational History    None   Social Needs    Financial resource strain: None    Food insecurity     Worry: None     Inability: None    Transportation needs     Medical: None     Non-medical: None   Tobacco Use    Smoking status: Current Every Day Smoker     Packs/day: 1.00     Years: 14.00     Pack years: 14.00     Types: Cigarettes    Smokeless tobacco: Never Used   Substance and Sexual Activity    Alcohol use: No    Drug use: No    Sexual activity: None   Lifestyle    Physical activity     Days per week: None     Minutes per session: None    Stress: None   Relationships    Social connections     Talks on phone: None     Gets together: None     Attends [employer] service: None     Active member of club or organization: None     Attends meetings of clubs or organizations: None     Relationship status: None    Intimate partner violence     Fear of current or ex partner: None     Emotionally abused: None     Physically abused: None     Forced sexual activity: None   Other Topics Concern    None   Social History Narrative    None         PHYSICAL EXAM    (up to 7 for level 4, 8 or more for level 5)     ED Triage Vitals   BP Temp Temp src Pulse Resp SpO2 Height Weight   -- -- -- -- -- -- -- --       Physical Exam  General :Patient is awake, alert, oriented, in no acute distress, nontoxic appearing  HEENT: Pupils are equally round and reactive to light, EOMI, conjunctivae clear. Oral mucosa is moist, no exudate. Uvula is midline  Cardiac: Heart regular rate, rhythm, no murmurs, rubs, or gallops  Lungs: Lungs are clear to auscultation, there is no wheezing, rhonchi, or rales. There is no use of accessory muscles. Abdomen: Abdomen is soft, nontender, nondistended. There is no firm or pulsatile masses, no rebound rigidity or guarding, obese  Musculoskeletal: 5 out of 5 strength in all 4 extremities. No focal muscle deficits are appreciated  Neuro: Motor intact, sensory intact, level of consciousness is decreased. The patient if not continually stimulated with tactile or verbal stimuli he will go to sleep. Was difficult to actually perform chest x-ray and EKG on the patient because he continued rolled over to go back to sleep. Dermatology: Skin is warm and dry  Psych: Mentation is grossly normal, cognition is grossly normal. Affect is appropriate. DIAGNOSTIC RESULTS     EKG: All EKG's are interpreted by the Emergency Department Physician who either signs or Co-signs this chart in the absenceof a cardiologist.    The EKG interpreted by me shows sinus tachycardia with a ventricular rate 107 bpm, NM intervals 158 ms, QRS duration 72 ms, QT/QTc is 350/467 ms. No acute T wave inversions concerning for acute myocardial ischemia. No ST elevations concerning for acute myocardial infarction.     RADIOLOGY:   Non-plain film images such as CT, Ultrasound and MRI are read by the radiologist. Plain radiographic images are visualized and preliminarily interpreted by the emergency physician with the below findings:      ? Radiologist's Report Reviewed:  XR CHEST PORTABLE   Final Result      No acute disease               ED BEDSIDE ULTRASOUND:   Performed by ED Physician - none    LABS:    I have reviewed and interpreted all of the currently available lab results from this visit (ifapplicable):  Results for orders placed or performed during the hospital encounter of 09/07/20   CBC Auto Differential   Result Value Ref Range    WBC 15.0 (H) 4.0 - 11.0 K/uL    RBC 4.29 (L) 4.50 - 6.00 M/uL    Hemoglobin 12.9 (L) 13.0 - 18.0 g/dL    Hematocrit 41.4 40.0 - 54.0 %    MCV 96.5 80.0 - 100.0 fL    MCH 30.1 27.0 - 32.0 pg    MCHC 31.2 31.0 - 35.0 g/dL    RDW 15.4 11.0 - 16.0 %    Platelets 505 873 - 212 K/uL    MPV 9.3 6.0 - 10.0 fL    Neutrophils % 81.2 %    Immature Granulocytes % 0.5 0.0 - 5.0 %    Lymphocytes % 11.4 %    Monocytes % 6.5 %    Eosinophils % 0.1 %    Basophils % 0.3 %    Neutrophils Absolute 12.2 (H) 2.0 - 7.5 K/uL    Immature Granulocytes # 0.1 K/uL    Lymphocytes Absolute 1.7 1.5 - 4.0 K/uL    Monocytes Absolute 1.0 (H) 0.2 - 0.8 K/uL    Eosinophils Absolute 0.0 0.0 - 0.4 K/uL    Basophils Absolute 0.1 0.0 - 0.1 K/uL   Comprehensive Metabolic Panel w/ Reflex to MG   Result Value Ref Range    Sodium 135 (L) 136 - 145 mmol/L    Potassium reflex Magnesium 4.3 3.4 - 5.1 mmol/L    Chloride 98 98 - 107 mmol/L    CO2 20 20 - 30 mmol/L    Anion Gap 17 (H) 3 - 16    Glucose 75 74 - 106 mg/dL    BUN 13 6 - 20 mg/dL    CREATININE 1.0 0.4 - 1.2 mg/dL    GFR Non-African American >59 >59    GFR African American >59 >59    Calcium 8.5 8.5 - 10.5 mg/dL    Total Protein 7.0 6.4 - 8.3 g/dL    Alb 4.1 3.4 - 4.8 g/dL    Albumin/Globulin Ratio 1.4 0.8 - 2.0    Total Bilirubin 0.4 0.3 - 1.2 mg/dL    Alkaline Phosphatase 35 25 - 100 U/L    ALT 18 4 - 36 U/L    AST 31 8 - 33 U/L    Globulin 2.9 g/dL   Troponin   Result Value Ref Range    Troponin <0.30 <0.30 ng/mL   Brain Natriuretic Peptide   Result Value Ref Range    Pro-BNP 93 0 - 1,800 pg/mL   Lactic Acid, Plasma   Result Value Ref Range    Lactic Acid 5.2 (HH) 0.4 - 2.0 mmol/L   Ethanol   Result Value Ref Range    Ethanol Lvl 392 mg/dL   Salicylate   Result Value Ref Range    Salicylate, Serum <0.7 (L) 15.0 - 30.0 mg/dL   Acetaminophen Level   Result Value Ref Range    Acetaminophen Level <15 10 - 30 ug/mL   POCT Glucose   Result Value Ref Range    Glucose 82 mg/dL    QC OK? yes    POCT Glucose   Result Value Ref Range    POC Glucose 82 74 - 106 mg/dl    Performed on ACCU-CHEK         All other labs were within normal range or not returned as of this dictation. EMERGENCY DEPARTMENT COURSE and DIFFERENTIAL DIAGNOSIS/MDM:   Vitals:    Vitals:    09/07/20 1437 09/07/20 1500 09/07/20 1520 09/07/20 1525   BP:  (!) 141/92 132/83    Pulse:   109    Resp:       Temp: 99.1 °F (37.3 °C)      TempSrc: Oral      SpO2:   94% 93%   Weight:       Height:           MEDICATIONS ADMINISTERED IN ED:  Medications   0.9 % sodium chloride infusion ( Intravenous New Bag 9/7/20 1521)   ipratropium-albuterol (DUONEB) nebulizer solution 1 ampule (1 ampule Inhalation Given 9/7/20 1523)       After initial evaluation and examination I did have a conversation with the patient about the upcoming plan, treatment and possible disposition which she was agreeable to the times dictation secondary to his alcohol use some not sure exactly how much of our conversation the patient actually retained. Patient did have one episode of a low pulse ox of 88% here however he coughed and set up and his pulse ox went back up into the mid 90 range on room air. Patient will be given infusion of normal saline 100 and hour. He will have DuoNeb. Patient will have a portable chest radiograph performed. Patient have CBC, CMP, troponin, BNP, rapid influenza, lactic acid, UA, ethanol, salicylic acid level, Tylenol level and a fingerstick glucose performed.   Patient also clinically significant/life threatening deterioration in the patient's condition which required my urgent intervention. FINAL IMPRESSION      1. Acute alcoholic intoxication without complication (HCC)    2. Elevated lactic acid level          DISPOSITION/PLAN   DISPOSITION        PATIENT REFERRED TO:  Vince Camargo, [address] [facility]   [phone]    In 2 days      [facility] Emergency Department  [facility] [address] [facility]  [phone]    As needed, If symptoms worsen      DISCHARGE MEDICATIONS:  New Prescriptions    No medications on file       Comment: Please note this report has been produced using speech recognition software and may contain errorsrelated to that system including errors in grammar, punctuation, and spelling, as well as words and phrases that may be inappropriate. If there are any questions or concerns please feel free to contact the dictating providerfor clarification.     Dimitris Bach DO  Attending Emergency Physician              Dimitris Bach DO  09/07/20 0927

## 2020-09-07 NOTE — ED PROVIDER NOTES
[address]  eMERGENCY dEPARTMENT eNCOUnter      Pt Name: Deanne Sahu  MRN: [id number]  [place]: 1950  Date of evaluation: 9/8/5318  Provider: Lonnie Venegas MD    [address] [place] [address]       Chief Complaint   Patient presents with    Altered Mental Status         HISTORY OF PRESENT ILLNESS  (Location/Symptom, Timing/Onset, Context/Setting, Quality, Duration,Modifying Factors, Severity.)   Deanne Sahu is a 78 y.o. male who presents to the emergency department via EMS for altered mental status. His wife called 48 but it's unclear as to why and the patient just keeps saying \"I don't feel well. \"        Nursing notes were reviewed. REVIEW OF SYSTEMS    (2-9 systems for level 4, 10 or more for level 5)   ROS:  General:  No fevers, no chills, no weakness  Cardiovascular:  No chest pain, no palpitations  Respiratory:  No shortness of breath, no cough, no wheezing  Gastrointestinal:  + pain, + nausea, no vomiting, no diarrhea  Musculoskeletal:  No muscle pain, no joint pain  Skin:  No rash, no easy bruising  Neurologic:  No speech problems, no headache, no extremity numbness, no extremity  tingling, no extremity weakness  Psychiatric:  No anxiety  Genitourinary:  No dysuria, no hematuria    Except as noted above the remainder of the review of systems was reviewed and negative.        PAST MEDICAL HISTORY     Past Medical History:   Diagnosis Date    Bipolar affective ([address]     Cholesterol blood decreased     Chronic back pain     COPD (chronic obstructive pulmonary disease) (HCC)     Depression     DJD (degenerative joint disease)     Environmental allergies     GERD (gastroesophageal reflux disease)     Hypertension     Insomnia     Seizures ([address]     patient was told a long time ago that he had a seizure         SURGICAL HISTORY       Past Surgical History:   Procedure Laterality Date    APPENDECTOMY      BACK SURGERY      CHOLECYSTECTOMY      HAND SURGERY Right 2014 CURRENT MEDICATIONS       Previous Medications    ALBUTEROL SULFATE  (90 BASE) MCG/ACT INHALER    Inhale 2 puffs into the lungs every 6 hours as needed for Wheezing    BUDESONIDE-FORMOTEROL (SYMBICORT) 160-4.5 MCG/ACT AERO    Inhale 2 puffs into the lungs 2 times daily as needed (episodic wheezing)    CALCIUM-VITAMIN D (OSCAL-500) 500-200 MG-UNIT PER TABLET    Take 1 tablet by mouth daily Indications: calcium 500 with vit D 125mg    CETIRIZINE (ZYRTEC) 10 MG TABLET    Take 1 tablet by mouth daily as needed for Allergies    CHOLECALCIFEROL (VITAMIN D3) 50 MCG (2000 UT) CAPS    Take 1 capsule by mouth daily    CYANOCOBALAMIN (B-12) 2500 MCG SUBL    Place 1 tablet under the tongue daily    DULOXETINE (CYMBALTA) 60 MG EXTENDED RELEASE CAPSULE    Take 1 capsule by mouth 2 times daily    FLUTICASONE (FLONASE) 50 MCG/ACT NASAL SPRAY    1-2 sprays by Nasal route daily as needed for Rhinitis    GABAPENTIN (NEURONTIN) 400 MG CAPSULE    Take 2 capsules by mouth 4 times daily for 30 days. METHOCARBAMOL (ROBAXIN) 750 MG TABLET    Take 1 tablet by mouth 3 times daily as needed (muscle spasms)    METOPROLOL SUCCINATE (TOPROL XL) 25 MG EXTENDED RELEASE TABLET    Take 1 tablet by mouth nightly    MULTIPLE VITAMINS-MINERALS (MULTIVITAMIN WITH MINERALS) TABLET    Take 1 tablet by mouth daily    NAPROXEN (NAPROSYN) 500 MG TABLET    Take 1 tablet by mouth 2 times daily as needed for Pain    OMEPRAZOLE (PRILOSEC) 20 MG DELAYED RELEASE CAPSULE    Take 1 capsule by mouth Daily    QUETIAPINE (SEROQUEL) 200 MG TABLET    Take 1 tablet by mouth 2 times daily    VITAMIN B-12 (CYANOCOBALAMIN) 100 MCG TABLET    Take 5 tablets by mouth daily Patient not sure of exact dose, doesn't take every day. ALLERGIES     Patient has no known allergies. FAMILY HISTORY     No family history on file.        SOCIAL HISTORY       Social History     Socioeconomic History    Marital status:      Spouse name: Not on file    Number of children: Not on file    Years of education: Not on file    Highest education level: Not on file   Occupational History    Not on file   Social Needs    Financial resource strain: Not on file    Food insecurity     Worry: Not on file     Inability: Not on file    Transportation needs     Medical: Not on file     Non-medical: Not on file   Tobacco Use    Smoking status: Current Every Day Smoker     Packs/day: 1.00     Years: 14.00     Pack years: 14.00     Types: Cigarettes    Smokeless tobacco: Never Used   Substance and Sexual Activity    Alcohol use: No    Drug use: No    Sexual activity: Not on file   Lifestyle    Physical activity     Days per week: Not on file     Minutes per session: Not on file    Stress: Not on file   Relationships    Social connections     Talks on phone: Not on file     Gets together: Not on file     Attends Advent service: Not on file     Active member of club or organization: Not on file     Attends meetings of clubs or organizations: Not on file     Relationship status: Not on file    Intimate partner violence     Fear of current or ex partner: Not on file     Emotionally abused: Not on file     Physically abused: Not on file     Forced sexual activity: Not on file   Other Topics Concern    Not on file   Social History Narrative    Not on file         PHYSICAL EXAM    (up to 7 for level 4, 8 or more for level 5)     ED Triage Vitals   BP Temp Temp Source Pulse Resp SpO2 Height Weight   09/06/20 2037 09/06/20 2037 09/06/20 2037 09/06/20 2037 09/06/20 2037 09/06/20 2037 09/06/20 2037 09/06/20 2041   (!) 142/94 98.2 °F (36.8 °C) Oral 97 20 94 % 5' 9\" (1.753 m) 200 lb (90.7 kg)       Physical Exam  General :Patient is awake, alert, appears to be in pain  Did not know the day of the week, but got September, did not know the year  Didn't know the President  HEENT: Pupils are equally round and reactive to light, EOMI. Oral mucosa is moist, no exudate.   No pharyngeal abnormality on noncontrast CT of the abdomen and pelvis. 2.  Hepatic steatosis. 3.  Moderate hiatal hernia. 4.  Colonic diverticulosis. XR CHEST PORTABLE   Final Result      No acute pulmonary disease.                ED BEDSIDE ULTRASOUND:   Performed by ED Physician - none    LABS:  Labs Reviewed   CBC WITH AUTO DIFFERENTIAL - Abnormal; Notable for the following components:       Result Value    Platelets 954 (*)     All other components within normal limits    Narrative:     Performed at:  [address] [place] [address] Laboratory  [address] [place] [address],  [place], [place] [address]   Phone [phone]   COMPREHENSIVE METABOLIC PANEL - Abnormal; Notable for the following components:    Chloride 95 (*)     All other components within normal limits    Narrative:     Performed at:  [address] [place] [address] Laboratory  [address] [place] [address],  [place], [place] [address]   Phone [phone]   LACTIC ACID, PLASMA - Abnormal; Notable for the following components:    Lactic Acid 4.0 (*)     All other components within normal limits    Narrative:     Bella Salmeron tel. [phone],  Chemistry results called to and read back by Elysia Alcantara, 09/06/2020  22:18, by [facility]  Performed at:  [address] [place] [address] Laboratory  [address] [place] [address],  [place], [place] [address]   Phone [phone]   URINE RT REFLEX TO CULTURE - Abnormal; Notable for the following components:    Ketones, Urine 15 (*)     All other components within normal limits    Narrative:     Performed at:  [address] [place] [address] Laboratory  [address] [place] [address],  [place], [place] [address] [address]   Phone [phone]   D-DIMER, QUANTITATIVE - Abnormal; Notable for the following components:    D-Dimer, Quant 1.09 (*)     All other components within normal limits    Narrative:     Bella Salmeron tel. [phone],  Coag results called to and read back by Ilan Kwon, 09/06/2020 22:17, by  [facility]  Performed at:  [address] [facility] Laboratory  [address] [place] [address],  [place], [place] [address]   Phone [phone]   DRUG SCREEN MULTI URINE - Abnormal; Notable for the following components:    Tricyclic POSITIVE (*)     All other components within normal limits    Narrative:     Performed at:  [facility] Laboratory  [address] [place] [address],  [place], [place] [address]   Phone [phone]   BRAIN NATRIURETIC PEPTIDE    Narrative:     Performed at:  [address] [place] [address] Laboratory  [address] [place] [address],  [place], [place] [address]   Phone [phone]   TROPONIN    Narrative:     Performed at:  [address] [place] [address] Laboratory  [address] [place] [address],  [place], [place] [address]   Phone [phone]   LIPASE    Narrative:     Performed at:  [address] [place] [address] Laboratory  [address] [place] [address],  [place], [place] [address]   Phone [phone]   ETHANOL    Narrative:     Performed at:  [address] [place] [address] Laboratory  [address] [place] [address],  [place], [place] [address]   Phone [phone]       I have reviewed and interpreted all ofthe currently available lab results from this visit (if applicable):  Results for orders placed or performed during the hospital encounter of 09/06/20   Brain Natriuretic Peptide   Result Value Ref Range    Pro-BNP 48 0 - 1,800 pg/mL   CBC Auto Differential   Result Value Ref Range    WBC 10.8 4.0 - 11.0 K/uL    RBC 5.01 4.50 - 6.00 M/uL    Hemoglobin 15.0 13.0 - 18.0 g/dL    Hematocrit 47.3 40.0 - 54.0 %    MCV 94.4 80.0 - 100.0 fL    MCH 29.9 27.0 - 32.0 pg    MCHC 31.7 31.0 - 35.0 g/dL    RDW 15.4 11.0 - 16.0 %    Platelets 785 (H) 338 - 400 K/uL    MPV 9.9 6.0 - 10.0 fL    Neutrophils % 66.4 %    Immature Granulocytes % 0.6 0.0 - 5.0 %    Lymphocytes % 23.5 %    Monocytes % 7.4 %    Eosinophils % 1.4 %    Basophils % 0.7 %    Neutrophils Absolute 7.2 2.0 - 7.5 K/uL Immature Granulocytes # 0.1 K/uL    Lymphocytes Absolute 2.5 1.5 - 4.0 K/uL    Monocytes Absolute 0.8 0.2 - 0.8 K/uL    Eosinophils Absolute 0.2 0.0 - 0.4 K/uL    Basophils Absolute 0.1 0.0 - 0.1 K/uL   Comprehensive Metabolic Panel   Result Value Ref Range    Sodium 136 136 - 145 mmol/L    Potassium 4.1 3.4 - 5.1 mmol/L    Chloride 95 (L) 98 - 107 mmol/L    CO2 25 20 - 30 mmol/L    Anion Gap 16 3 - 16    Glucose 88 74 - 106 mg/dL    BUN 14 6 - 20 mg/dL    CREATININE 1.0 0.4 - 1.2 mg/dL    GFR Non-African American >59 >59    GFR African American >59 >59    Calcium 9.5 8.5 - 10.5 mg/dL    Total Protein 8.1 6.4 - 8.3 g/dL    Alb 4.7 3.4 - 4.8 g/dL    Albumin/Globulin Ratio 1.4 0.8 - 2.0    Total Bilirubin 0.3 0.3 - 1.2 mg/dL    Alkaline Phosphatase 36 25 - 100 U/L    ALT 17 4 - 36 U/L    AST 30 8 - 33 U/L    Globulin 3.4 g/dL   Troponin   Result Value Ref Range    Troponin <0.30 <0.30 ng/mL   Lipase   Result Value Ref Range    Lipase 48.0 5.6 - 51.3 U/L   Lactic Acid, Plasma   Result Value Ref Range    Lactic Acid 4.0 (HH) 0.4 - 2.0 mmol/L   Urinalysis Reflex to Culture    Specimen: Urine, clean catch   Result Value Ref Range    Color, UA Yellow Straw/Yellow    Clarity, UA Clear Clear    Glucose, Ur Negative Negative mg/dL    Bilirubin Urine Negative Negative    Ketones, Urine 15 (A) Negative mg/dL    Specific Gravity, UA 1.010 1.005 - 1.030    Blood, Urine Negative Negative    pH, UA 6.5 5.0 - 8.0    Protein, UA Negative Negative mg/dL    Urobilinogen, Urine 0.2 <2.0 E.U./dL    Nitrite, Urine Negative Negative    Leukocyte Esterase, Urine Negative Negative    Microscopic Examination Not Indicated     Urine Type Catheter     Urine Reflex to Culture Not Indicated    D-Dimer, Quantitative   Result Value Ref Range    D-Dimer, Quant 1.09 (HH) 0.00 - 0.60 ug/mL FEU   Drug Screen, Multiple, Urine   Result Value Ref Range    PCP Screen, Urine Neg Negative <25 ng/mL    Benzodiazepine Screen, Urine Neg Negative <300 ng/mL Cocaine Metabolite Screen, Urine Neg Negative <300 ng/mL    Amphetamine Screen, Urine Neg Negative <1000 ng/mL    Cannabinoid Scrn, Ur Neg Negative <50 ng/mL    Opiate Scrn, Ur Neg Negative <300 ng/mL    Barbiturate Screen, Ur Neg Negative <896 ng/mL    Tricyclic POSITIVE (A) Negative <300 ng/mL    Methadone Screen, Urine Neg Negative <300 ng/ml    Propoxyphene Screen, Urine Neg Negative <300 ng/mL    Methamphetamine, Urine Neg Negative <1000 ng/mL    UR Oxycodone Rapid Screen Neg Negative <100 ng/mL    Drug Screen Comment: see below    Ethanol   Result Value Ref Range    Ethanol Lvl 311 mg/dL        All other labs were within normal range or not returned as of this dictation. EMERGENCY DEPARTMENT COURSE and DIFFERENTIAL DIAGNOSIS/MDM:   Vitals:  Vitals:    09/07/20 0400 09/07/20 0415 09/07/20 0430 09/07/20 0445   BP: 120/76 123/73 133/75 119/77   Pulse:       Resp:       Temp:       TempSrc:       SpO2:       Weight:       Height:           Patient was very rambunctious and pulled out his IV. He would not lay still for his CT scan. I gave him Ativan 1mg IV but this did not help. I gave him Geodon 10mg IM but this did not help so an additional 10mg had to be given. The patient finally went to sleep. He did, however, move while in CT scanner. The patient then came back to the ER and fell into a deep sleep. His BP dropped. Likely this is due to medications and ETOH intoxication. No signs of infection and patient has undergone an extensive workup. He was given IVF's and a banana bag. His BP improved. His wife has dementia and she is not comfortable taking him home at this time. Will admit for observation. 0500  Patient woke up before he was admitted. He was completely appropriate and his mental status totally cleared. He was able to answer all of the questions that he could not answer earlier tonight. He was able to ambulate without difficulty.   Family notified and was ok picking him up and taking him home. Disposition changed to discharge. CONSULTS:  None    PROCEDURES:  Procedures    CRITICAL CARE TIME    Total Critical Care time was 0 minutes, excluding separately reportable procedures. There was a high probability of clinically significant/life threatening deterioration in the patient's condition which required my urgent intervention. FINAL IMPRESSION      1. Altered mental status, unspecified altered mental status type    2. Acute alcoholic intoxication with delirium (HCC)    3. Elevated lactic acid level    4. Elevated d-dimer          DISPOSITION/PLAN   DISPOSITION      Discharge    PATIENT REFERRED TO:  DYLAN Wetzel  [address]  [phone]    Schedule an appointment as soon as possible for a visit in 2 days  As needed      DISCHARGE MEDICATIONS:  New Prescriptions    No medications on file       Comment: Please note this report has been produced using speech recognition software and may contain errorsrelated to that system including errors in grammar, punctuation, and spelling, as well as words and phrases that may be inappropriate. If there are any questions or concerns please feel free to contact the dictating providerfor clarification.     Zaheer Cuello MD  Attending Emergency Physician                Zaheer Cuello MD  09/07/20 4363

## 2020-09-07 NOTE — ED NOTES
PT's sister, Jonathan Ramirez called for information. Took her phone number and advised a nurse or staff will contact her later.       Kulwant Lang  09/07/20 7605

## 2020-09-07 NOTE — ED NOTES
Administered Isovue 370 per protocol via patent hep lock established in ER. Pt tolerated the contrast with no obvious reaction to the contrast.    Pt would not lie still on the CT table. ER RN was with patient during exam due to the administration of meds prior to exam attempt. Best exam possible.     Pt d/c'd back to ER at 01:00 AM

## 2020-09-07 NOTE — ED NOTES
Pt moved to CT and could not follow direction or lay still on CT table. CT aborted at this time. Discussed with ER MD and she recommended waiting 30-40 minutes and re-attempt the CTA's. Pt returned to ER at 23:50.

## 2020-09-07 NOTE — ED NOTES
Unable to administer zofran as iv from ems has been pulled out by pt.   Pt to ct scan at this time     Lety Lira RN  09/06/20 2102       Lety Lira RN  09/06/20 2119

## 2020-09-07 NOTE — ED NOTES
Pt sleeping dr Ashley Ca called to see if pt is good admission. Advised he had to have geodon earlier but is getting rally pack and will hopefully waken more cooperative.  Dr Ashley Ca advised pt was to be obs and dc'd when he woke up      OMID Gramajo  09/07/20 2510

## 2020-09-07 NOTE — ED NOTES
Notified Dr. Benito Garcia of critical d-dimer and lactic lab result.       Paco Velez RN  09/06/20 2663

## 2020-09-18 ENCOUNTER — OFFICE VISIT (OUTPATIENT)
Dept: PRIMARY CARE CLINIC | Age: 70
End: 2020-09-18
Payer: MEDICARE

## 2020-09-18 VITALS
RESPIRATION RATE: 16 BRPM | WEIGHT: 189 LBS | HEART RATE: 88 BPM | TEMPERATURE: 98 F | DIASTOLIC BLOOD PRESSURE: 80 MMHG | HEIGHT: 69 IN | OXYGEN SATURATION: 98 % | SYSTOLIC BLOOD PRESSURE: 130 MMHG | BODY MASS INDEX: 27.99 KG/M2

## 2020-09-18 PROCEDURE — 99214 OFFICE O/P EST MOD 30 MIN: CPT | Performed by: NURSE PRACTITIONER

## 2020-09-18 PROCEDURE — G0009 ADMIN PNEUMOCOCCAL VACCINE: HCPCS | Performed by: NURSE PRACTITIONER

## 2020-09-18 PROCEDURE — 90732 PPSV23 VACC 2 YRS+ SUBQ/IM: CPT | Performed by: NURSE PRACTITIONER

## 2020-09-18 RX ORDER — MIRTAZAPINE 30 MG/1
30 TABLET, FILM COATED ORAL NIGHTLY
COMMUNITY
End: 2020-09-18 | Stop reason: SDUPTHER

## 2020-09-18 RX ORDER — AZITHROMYCIN 250 MG/1
TABLET, FILM COATED ORAL
Qty: 6 TABLET | Refills: 0 | Status: SHIPPED | OUTPATIENT
Start: 2020-09-18 | End: 2020-09-28

## 2020-09-18 RX ORDER — MIRTAZAPINE 30 MG/1
30 TABLET, FILM COATED ORAL NIGHTLY
Qty: 30 TABLET | Refills: 5 | Status: SHIPPED | OUTPATIENT
Start: 2020-09-18 | End: 2021-02-23

## 2020-09-18 RX ORDER — CETIRIZINE HYDROCHLORIDE 10 MG/1
10 TABLET ORAL DAILY PRN
Qty: 30 TABLET | Refills: 5 | Status: SHIPPED | OUTPATIENT
Start: 2020-09-18

## 2020-09-18 RX ORDER — METOPROLOL SUCCINATE 25 MG/1
25 TABLET, EXTENDED RELEASE ORAL NIGHTLY
Qty: 30 TABLET | Refills: 5 | Status: SHIPPED | OUTPATIENT
Start: 2020-09-18 | End: 2021-02-23

## 2020-09-18 RX ORDER — QUETIAPINE FUMARATE 200 MG/1
200 TABLET, FILM COATED ORAL 2 TIMES DAILY
Qty: 60 TABLET | Refills: 5 | Status: ON HOLD | OUTPATIENT
Start: 2020-09-18 | End: 2021-10-14

## 2020-09-18 RX ORDER — MULTIVIT-MIN/IRON FUM/FOLIC AC 7.5 MG-4
1 TABLET ORAL DAILY
Qty: 30 TABLET | Refills: 5 | Status: SHIPPED | OUTPATIENT
Start: 2020-09-18 | End: 2021-06-04 | Stop reason: SDUPTHER

## 2020-09-18 RX ORDER — ACETAMINOPHEN 160 MG
1 TABLET,DISINTEGRATING ORAL DAILY
Qty: 30 CAPSULE | Refills: 5 | Status: SHIPPED | OUTPATIENT
Start: 2020-09-18 | End: 2022-07-30

## 2020-09-18 ASSESSMENT — ENCOUNTER SYMPTOMS
NAUSEA: 0
RHINORRHEA: 0
EYE ITCHING: 0
ABDOMINAL PAIN: 0
EYE REDNESS: 0
EYE DISCHARGE: 0
DIARRHEA: 0
VOMITING: 0
COUGH: 0
SORE THROAT: 0
CONSTIPATION: 0
SHORTNESS OF BREATH: 0

## 2020-09-18 NOTE — PROGRESS NOTES
Chief Complaint   Patient presents with    Hypertension    Gastroesophageal Reflux    COPD       Have you seen any other physician or provider since your last visit no    Have you had any other diagnostic tests since your last visit? no    Have you changed or stopped any medications since your last visit? no     Review of Systems   Constitutional: Negative for chills, fatigue and fever. HENT: Negative for congestion, ear pain, rhinorrhea and sore throat. Eyes: Negative for discharge, redness and itching. Respiratory: Negative for cough and shortness of breath. Cardiovascular: Negative for chest pain, palpitations and leg swelling. Gastrointestinal: Negative for abdominal pain, constipation, diarrhea, nausea and vomiting. Endocrine: Negative for cold intolerance and heat intolerance. Genitourinary: Negative for dysuria. Musculoskeletal: Negative for arthralgias and joint swelling. Skin: Negative for rash and wound. Neurological: Negative for weakness and headaches. Hematological: Negative for adenopathy. Psychiatric/Behavioral: Negative for dysphoric mood and sleep disturbance. The patient is not nervous/anxious. Pneumococcal Information Sheet, \"Prevnar 13/Pneumovax 23\" given to Jayna Dent. Patient/Legal guardian of Jayna Westsachin verbalized understanding. Patient Responses    Have you had a pneumonia shot before? No  If so, when? Have you ever had an allergic reaction to a vaccine? No  Do you feel ill or have a fever today? No  Are you currently taking an antibiotic? No    Pneumococcal vaccine given per order. Please see immunization tab. Risks and benefits explained. Current VIS given. Pt stayed in office x 20 mins after imm.     Immunizations Administered     Name Date Dose Route    Pneumococcal Polysaccharide ([id number]) 9/18/2020 0.5 mL Intramuscular    Site: Deltoid- Left    Lot: [id number]    NDC: 2407-1313-98

## 2020-09-18 NOTE — PROGRESS NOTES
SUBJECTIVE:    Patient ID: Laverda Dancer is a 78 y.o. male. Medical History Review  Past Medical, Family, and Social History reviewed and does contribute to the patient presenting condition    Health Maintenance Due   Topic Date Due    Hepatitis C screen  1950    Colon cancer screen colonoscopy  06/07/2002    Annual Wellness Visit (AWV)  08/30/2020    Flu vaccine (1) 09/01/2020       HPI:   Chief Complaint   Patient presents with    Hypertension    Gastroesophageal Reflux    COPD   He was seen in the ER for altered mental status. He states he feels like he got too hot. He had drank some moonshine that evening as well. He states he is doing well except for his chronic back pain. Patient's medications, allergies, past medical, surgical, social and family histories were reviewed and updated as appropriate. Review of Systems Reviewed and acurate. See MA note. OBJECTIVE:  /80 (Site: Right Upper Arm, Position: Sitting)   Pulse 88   Temp 98 °F (36.7 °C) (Temporal)   Resp 16   Ht 5' 9\" (1.753 m)   Wt 189 lb (85.7 kg)   SpO2 98% Comment: room air  BMI 27.91 kg/m²    Physical Exam  Vitals signs reviewed. Constitutional:       General: He is not in acute distress. Appearance: He is well-developed. HENT:      Head: Normocephalic. Right Ear: Tympanic membrane normal.      Left Ear: Tympanic membrane normal.      Mouth/Throat:      Pharynx: No oropharyngeal exudate. Eyes:      General: Lids are normal.   Neck:      Musculoskeletal: Neck supple. Cardiovascular:      Rate and Rhythm: Normal rate and regular rhythm. Heart sounds: Normal heart sounds. Pulmonary:      Effort: Pulmonary effort is normal.      Breath sounds: Rhonchi present. Abdominal:      General: Bowel sounds are normal. There is no distension. Palpations: Abdomen is soft. Tenderness: There is no abdominal tenderness. Lymphadenopathy:      Cervical: No cervical adenopathy.    Skin: General: Skin is warm and dry. Neurological:      Mental Status: He is alert and oriented to person, place, and time.          Results in Past 30 Days  Result Component Current Result Ref Range Previous Result Ref Range   Alb 4.1 (9/7/2020) 3.4 - 4.8 g/dL 4.7 (9/6/2020) 3.4 - 4.8 g/dL   Albumin/Globulin Ratio 1.4 (9/7/2020) 0.8 - 2.0 1.4 (9/6/2020) 0.8 - 2.0   Alkaline Phosphatase 35 (9/7/2020) 25 - 100 U/L 36 (9/6/2020) 25 - 100 U/L   ALT 18 (9/7/2020) 4 - 36 U/L 17 (9/6/2020) 4 - 36 U/L   AST 31 (9/7/2020) 8 - 33 U/L 30 (9/6/2020) 8 - 33 U/L   BUN 13 (9/7/2020) 6 - 20 mg/dL 14 (9/6/2020) 6 - 20 mg/dL   Calcium 8.5 (9/7/2020) 8.5 - 10.5 mg/dL 9.5 (9/6/2020) 8.5 - 10.5 mg/dL   Chloride 98 (9/7/2020) 98 - 107 mmol/L 95 (L) (9/6/2020) 98 - 107 mmol/L   CO2 20 (9/7/2020) 20 - 30 mmol/L 25 (9/6/2020) 20 - 30 mmol/L   CREATININE 1.0 (9/7/2020) 0.4 - 1.2 mg/dL 1.0 (9/6/2020) 0.4 - 1.2 mg/dL   GFR  >59 (9/7/2020) >59 >59 (9/6/2020) >59   GFR Non- >59 (9/7/2020) >59 >59 (9/6/2020) >59   Globulin 2.9 (9/7/2020) g/dL 3.4 (9/6/2020) g/dL   Glucose 82 (9/7/2020) mg/dL 75 (9/7/2020) 74 - 106 mg/dL   Potassium reflex Magnesium 4.3 (9/7/2020) 3.4 - 5.1 mmol/L 4.1 (9/6/2020) 3.4 - 5.1 mmol/L   Sodium 135 (L) (9/7/2020) 136 - 145 mmol/L 136 (9/6/2020) 136 - 145 mmol/L   Total Bilirubin 0.4 (9/7/2020) 0.3 - 1.2 mg/dL 0.3 (9/6/2020) 0.3 - 1.2 mg/dL   Total Protein 7.0 (9/7/2020) 6.4 - 8.3 g/dL 8.1 (9/6/2020) 6.4 - 8.3 g/dL     Hemoglobin A1C (%)   Date Value   08/17/2015 5.6     Microscopic Examination (no units)   Date Value   09/06/2020 Not Indicated     LDL Calculated (mg/dL)   Date Value   12/13/2019 87       Lab Results   Component Value Date    WBC 15.0 (H) 09/07/2020    NEUTROABS 12.2 (H) 09/07/2020    HGB 12.9 (L) 09/07/2020    HCT 41.4 09/07/2020    MCV 96.5 09/07/2020     09/07/2020     Lab Results   Component Value Date    TSH 1.25 10/25/2018       Prior to Visit Medications Medication Sig Taking? Authorizing Provider   QUEtiapine (SEROQUEL) 200 MG tablet Take 1 tablet by mouth 2 times daily Yes DYLAN Llamas   mirtazapine (REMERON) 30 MG tablet Take 1 tablet by mouth nightly Yes DYLAN Llamas   cetirizine (ZYRTEC) 10 MG tablet Take 1 tablet by mouth daily as needed for Allergies Yes DYLAN Llamas   metoprolol succinate (TOPROL XL) 25 MG extended release tablet Take 1 tablet by mouth nightly Yes DYLAN Llamas   Multiple Vitamins-Minerals (MULTIVITAMIN WITH MINERALS) tablet Take 1 tablet by mouth daily Yes DYLAN Llamas   Cholecalciferol (VITAMIN D3) 50 MCG (2000 UT) CAPS Take 1 capsule by mouth daily Yes DYLAN Llamas   azithromycin (ZITHROMAX Z-EVANGELINA) 250 MG tablet As directed Yes DYLAN Llamas   naproxen (NAPROSYN) 500 MG tablet Take 1 tablet by mouth 2 times daily as needed for Pain Yes DYLAN Llamas   Cyanocobalamin (B-12) 2500 MCG SUBL Place 1 tablet under the tongue daily Yes DYLAN Llamas   albuterol sulfate  (90 Base) MCG/ACT inhaler Inhale 2 puffs into the lungs every 6 hours as needed for Wheezing Yes DYLAN Llamas   DULoxetine (CYMBALTA) 60 MG extended release capsule Take 1 capsule by mouth 2 times daily Yes DYLAN Llamas   fluticasone (FLONASE) 50 MCG/ACT nasal spray 1-2 sprays by Nasal route daily as needed for Rhinitis Yes DYLAN Llamas   budesonide-formoterol (SYMBICORT) 160-4.5 MCG/ACT AERO Inhale 2 puffs into the lungs 2 times daily as needed (episodic wheezing) Yes DYLAN Llamas   calcium-vitamin D (OSCAL-500) 500-200 MG-UNIT per tablet Take 1 tablet by mouth daily Indications: calcium 500 with vit D 125mg Yes DYLAN Llamas   omeprazole (PRILOSEC) 20 MG delayed release capsule Take 1 capsule by mouth Daily Yes DYLAN Llamas   gabapentin (NEURONTIN) 400 MG capsule Take 2 capsules by mouth 4 times daily for 30 days.  Yes [place] DYLAN Chandler   methocarbamol (ROBAXIN) 750 MG tablet Take 1 tablet by mouth 3 times daily as needed (muscle spasms) Yes DYLAN Wetzel       ASSESSMENT:  1. Acute bronchitis, unspecified organism    2. Chronic obstructive pulmonary disease, unspecified COPD type ([facility] [address]    3. Essential hypertension    4. Hyperlipidemia, unspecified hyperlipidemia type    5. Anxiety    6. Need for pneumococcal vaccination    7. Need for influenza vaccination        PLAN:  Orders Placed This Encounter   Medications    QUEtiapine (SEROQUEL) 200 MG tablet     Sig: Take 1 tablet by mouth 2 times daily     Dispense:  60 tablet     Refill:  5    mirtazapine (REMERON) 30 MG tablet     Sig: Take 1 tablet by mouth nightly     Dispense:  30 tablet     Refill:  5    cetirizine (ZYRTEC) 10 MG tablet     Sig: Take 1 tablet by mouth daily as needed for Allergies     Dispense:  30 tablet     Refill:  5    metoprolol succinate (TOPROL XL) 25 MG extended release tablet     Sig: Take 1 tablet by mouth nightly     Dispense:  30 tablet     Refill:  5    Multiple Vitamins-Minerals (MULTIVITAMIN WITH MINERALS) tablet     Sig: Take 1 tablet by mouth daily     Dispense:  30 tablet     Refill:  5    Cholecalciferol (VITAMIN D3) 50 MCG (2000 UT) CAPS     Sig: Take 1 capsule by mouth daily     Dispense:  30 capsule     Refill:  5    azithromycin (ZITHROMAX Z-EVANGELINA) 250 MG tablet     Sig: As directed     Dispense:  6 tablet     Refill:  0     Orders Placed This Encounter   Procedures    Pneumococcal polysaccharide vaccine 23-valent greater than or equal to 1yo subcutaneous/IM       Return in about 3 months (around 12/18/2020).

## 2020-10-02 RX ORDER — METHOCARBAMOL 750 MG/1
TABLET, FILM COATED ORAL
Qty: 90 TABLET | Refills: 3 | OUTPATIENT
Start: 2020-10-02

## 2020-10-02 RX ORDER — DIPHENOXYLATE HYDROCHLORIDE AND ATROPINE SULFATE 2.5; .025 MG/1; MG/1
TABLET ORAL
Qty: 30 TABLET | Refills: 5 | OUTPATIENT
Start: 2020-10-02

## 2020-10-09 ENCOUNTER — APPOINTMENT (OUTPATIENT)
Dept: CT IMAGING | Facility: HOSPITAL | Age: 70
End: 2020-10-09
Payer: MEDICARE

## 2020-10-09 ENCOUNTER — HOSPITAL ENCOUNTER (EMERGENCY)
Facility: HOSPITAL | Age: 70
Discharge: HOME | End: 2020-10-10
Attending: FAMILY MEDICINE
Payer: MEDICARE

## 2020-10-09 ENCOUNTER — APPOINTMENT (OUTPATIENT)
Dept: GENERAL RADIOLOGY | Facility: HOSPITAL | Age: 70
End: 2020-10-09
Payer: MEDICARE

## 2020-10-09 LAB
A/G RATIO: 1.3 (ref 0.8–2)
ALBUMIN SERPL-MCNC: 5.1 G/DL (ref 3.4–4.8)
ALP BLD-CCNC: 49 U/L (ref 25–100)
ALT SERPL-CCNC: 46 U/L (ref 4–36)
ANION GAP SERPL CALCULATED.3IONS-SCNC: 32 MMOL/L (ref 3–16)
AST SERPL-CCNC: 43 U/L (ref 8–33)
BASOPHILS ABSOLUTE: 0 K/UL (ref 0–0.1)
BASOPHILS RELATIVE PERCENT: 0.1 %
BILIRUB SERPL-MCNC: 1.3 MG/DL (ref 0.3–1.2)
BUN BLDV-MCNC: 59 MG/DL (ref 6–20)
CALCIUM SERPL-MCNC: 10.1 MG/DL (ref 8.5–10.5)
CHLORIDE BLD-SCNC: 82 MMOL/L (ref 98–107)
CO2: 15 MMOL/L (ref 20–30)
CREAT SERPL-MCNC: 2.1 MG/DL (ref 0.4–1.2)
EOSINOPHILS ABSOLUTE: 0 K/UL (ref 0–0.4)
EOSINOPHILS RELATIVE PERCENT: 0 %
GFR AFRICAN AMERICAN: 38
GFR NON-AFRICAN AMERICAN: 32
GLOBULIN: 3.9 G/DL
GLUCOSE BLD-MCNC: 133 MG/DL (ref 74–106)
HCT VFR BLD CALC: 50.7 % (ref 40–54)
HEMOGLOBIN: 16.5 G/DL (ref 13–18)
IMMATURE GRANULOCYTES #: 0.1 K/UL
IMMATURE GRANULOCYTES %: 0.5 % (ref 0–5)
LIPASE: 68 U/L (ref 5.6–51.3)
LYMPHOCYTES ABSOLUTE: 1.3 K/UL (ref 1.5–4)
LYMPHOCYTES RELATIVE PERCENT: 6.5 %
MCH RBC QN AUTO: 30.4 PG (ref 27–32)
MCHC RBC AUTO-ENTMCNC: 32.5 G/DL (ref 31–35)
MCV RBC AUTO: 93.5 FL (ref 80–100)
MONOCYTES ABSOLUTE: 2.5 K/UL (ref 0.2–0.8)
MONOCYTES RELATIVE PERCENT: 12.5 %
NEUTROPHILS ABSOLUTE: 16.1 K/UL (ref 2–7.5)
NEUTROPHILS RELATIVE PERCENT: 80.4 %
PDW BLD-RTO: 17.3 % (ref 11–16)
PLATELET # BLD: 336 K/UL (ref 150–400)
PMV BLD AUTO: 11.3 FL (ref 6–10)
POTASSIUM REFLEX MAGNESIUM: 4.2 MMOL/L (ref 3.4–5.1)
RAPID INFLUENZA  B AGN: NEGATIVE
RAPID INFLUENZA A AGN: NEGATIVE
RBC # BLD: 5.42 M/UL (ref 4.5–6)
S PYO AG THROAT QL: NEGATIVE
SODIUM BLD-SCNC: 129 MMOL/L (ref 136–145)
TOTAL PROTEIN: 9 G/DL (ref 6.4–8.3)
WBC # BLD: 20.1 K/UL (ref 4–11)

## 2020-10-09 PROCEDURE — 74176 CT ABD & PELVIS W/O CONTRAST: CPT

## 2020-10-09 PROCEDURE — 99284 EMERGENCY DEPT VISIT MOD MDM: CPT

## 2020-10-09 PROCEDURE — 80053 COMPREHEN METABOLIC PANEL: CPT

## 2020-10-09 PROCEDURE — 99283 EMERGENCY DEPT VISIT LOW MDM: CPT

## 2020-10-09 PROCEDURE — 85025 COMPLETE CBC W/AUTO DIFF WBC: CPT

## 2020-10-09 PROCEDURE — 2580000003 HC RX 258: Performed by: FAMILY MEDICINE

## 2020-10-09 PROCEDURE — 6360000002 HC RX W HCPCS: Performed by: FAMILY MEDICINE

## 2020-10-09 PROCEDURE — 71045 X-RAY EXAM CHEST 1 VIEW: CPT

## 2020-10-09 PROCEDURE — 96374 THER/PROPH/DIAG INJ IV PUSH: CPT

## 2020-10-09 PROCEDURE — 87880 STREP A ASSAY W/OPTIC: CPT

## 2020-10-09 PROCEDURE — 83690 ASSAY OF LIPASE: CPT

## 2020-10-09 PROCEDURE — U0003 INFECTIOUS AGENT DETECTION BY NUCLEIC ACID (DNA OR RNA); SEVERE ACUTE RESPIRATORY SYNDROME CORONAVIRUS 2 (SARS-COV-2) (CORONAVIRUS DISEASE [COVID-19]), AMPLIFIED PROBE TECHNIQUE, MAKING USE OF HIGH THROUGHPUT TECHNOLOGIES AS DESCRIBED BY CMS-2020-01-R: HCPCS

## 2020-10-09 PROCEDURE — 36415 COLL VENOUS BLD VENIPUNCTURE: CPT

## 2020-10-09 PROCEDURE — 87804 INFLUENZA ASSAY W/OPTIC: CPT

## 2020-10-09 RX ORDER — ONDANSETRON 2 MG/ML
4 INJECTION INTRAMUSCULAR; INTRAVENOUS ONCE
Status: DISCONTINUED | OUTPATIENT
Start: 2020-10-09 | End: 2020-10-09

## 2020-10-09 RX ORDER — PROCHLORPERAZINE EDISYLATE 5 MG/ML
10 INJECTION INTRAMUSCULAR; INTRAVENOUS ONCE
Status: COMPLETED | OUTPATIENT
Start: 2020-10-09 | End: 2020-10-09

## 2020-10-09 RX ORDER — 0.9 % SODIUM CHLORIDE 0.9 %
1000 INTRAVENOUS SOLUTION INTRAVENOUS ONCE
Status: COMPLETED | OUTPATIENT
Start: 2020-10-09 | End: 2020-10-09

## 2020-10-09 RX ORDER — PROMETHAZINE HYDROCHLORIDE 25 MG/ML
12.5 INJECTION, SOLUTION INTRAMUSCULAR; INTRAVENOUS ONCE
Status: DISCONTINUED | OUTPATIENT
Start: 2020-10-09 | End: 2020-10-09

## 2020-10-09 RX ORDER — MORPHINE SULFATE 4 MG/ML
4 INJECTION, SOLUTION INTRAMUSCULAR; INTRAVENOUS ONCE
Status: DISCONTINUED | OUTPATIENT
Start: 2020-10-09 | End: 2020-10-10 | Stop reason: HOSPADM

## 2020-10-09 RX ADMIN — SODIUM CHLORIDE 1000 ML: 9 INJECTION, SOLUTION INTRAVENOUS at 20:31

## 2020-10-09 RX ADMIN — SODIUM CHLORIDE 1000 ML: 9 INJECTION, SOLUTION INTRAVENOUS at 21:31

## 2020-10-09 RX ADMIN — PROCHLORPERAZINE EDISYLATE 10 MG: 5 INJECTION INTRAMUSCULAR; INTRAVENOUS at 20:52

## 2020-10-09 ASSESSMENT — ENCOUNTER SYMPTOMS
VOMITING: 1
NAUSEA: 1
RHINORRHEA: 1

## 2020-10-09 ASSESSMENT — PAIN SCALES - GENERAL: PAINLEVEL_OUTOF10: 10

## 2020-10-10 VITALS
HEART RATE: 130 BPM | OXYGEN SATURATION: 96 % | TEMPERATURE: 99 F | BODY MASS INDEX: 26.96 KG/M2 | HEIGHT: 69 IN | SYSTOLIC BLOOD PRESSURE: 131 MMHG | DIASTOLIC BLOOD PRESSURE: 96 MMHG | WEIGHT: 182 LBS | RESPIRATION RATE: 19 BRPM

## 2020-10-10 RX ORDER — ONDANSETRON 4 MG/1
4 TABLET, ORALLY DISINTEGRATING ORAL EVERY 8 HOURS PRN
Qty: 12 TABLET | Refills: 0 | Status: SHIPPED | OUTPATIENT
Start: 2020-10-10 | End: 2021-03-18 | Stop reason: ALTCHOICE

## 2020-10-10 NOTE — ED PROVIDER NOTES
[address] [place] [address] Court  eMERGENCY dEPARTMENT eNCOUnter      Pt Name: Gail Monterroso  MRN: [id number]  [place] 1950  Date of evaluation: 10/9/2020  Provider: Donita Xiong, [address] [place] [address]       Chief Complaint   Patient presents with    Emesis     Onset 2days,unable to keep anything down.,         HISTORY OF PRESENT ILLNESS   (Location/Symptom, Timing/Onset, Context/Setting, Quality, Duration, Modifying Factors, Severity)  Note limiting factors. Gail Monterroso is a 78 y.o. male who presents to the emergency department for having 2-3 day history of nausea and vomiting, body aches, unable to taste or smell. Pt said that on Wed, he started hurting all over and didn't feel well. He went to lay down and sleep. On Thursday, he started sneezing and had some drainage but didn't know what color it was. He then started having nausea and vomiting and vomited about 10-20 times yesterday and today. No diarrhea. No abdominal pain. No headache, mild body aches, no chest pain, shortness of breath. No fever. No COVID exposure. Nursing Notes were reviewed. REVIEW OF SYSTEMS    (2-9 systems for level 4, 10 or more forlevel 5)     Review of Systems   Constitutional: Positive for activity change, appetite change, chills and fatigue. HENT: Positive for rhinorrhea and sneezing. Loss of taste and smell   Gastrointestinal: Positive for nausea and vomiting. Musculoskeletal: Positive for arthralgias. All other systems reviewed and are negative.           PAST MEDICAL HISTORY     Past Medical History:   Diagnosis Date    Bipolar affective ([facility] [address]     Cholesterol blood decreased     Chronic back pain     COPD (chronic obstructive pulmonary disease) (HCC)     Depression     DJD (degenerative joint disease)     Environmental allergies     GERD (gastroesophageal reflux disease)     Hypertension     Insomnia     Seizures ([address]     patient was told a long time ago that he had a seizure SURGICAL HISTORY       Past Surgical History:   Procedure Laterality Date    APPENDECTOMY      BACK SURGERY      CHOLECYSTECTOMY      HAND SURGERY Right 2014         CURRENT MEDICATIONS       Previous Medications    ALBUTEROL SULFATE  (90 BASE) MCG/ACT INHALER    Inhale 2 puffs into the lungs every 6 hours as needed for Wheezing    BUDESONIDE-FORMOTEROL (SYMBICORT) 160-4.5 MCG/ACT AERO    Inhale 2 puffs into the lungs 2 times daily as needed (episodic wheezing)    CALCIUM-VITAMIN D (OSCAL-500) 500-200 MG-UNIT PER TABLET    Take 1 tablet by mouth daily Indications: calcium 500 with vit D 125mg    CETIRIZINE (ZYRTEC) 10 MG TABLET    Take 1 tablet by mouth daily as needed for Allergies    CHOLECALCIFEROL (VITAMIN D3) 50 MCG (2000 UT) CAPS    Take 1 capsule by mouth daily    CYANOCOBALAMIN (B-12) 2500 MCG SUBL    Place 1 tablet under the tongue daily    DULOXETINE (CYMBALTA) 60 MG EXTENDED RELEASE CAPSULE    Take 1 capsule by mouth 2 times daily    FLUTICASONE (FLONASE) 50 MCG/ACT NASAL SPRAY    1-2 sprays by Nasal route daily as needed for Rhinitis    GABAPENTIN (NEURONTIN) 400 MG CAPSULE    Take 2 capsules by mouth 4 times daily for 30 days. METHOCARBAMOL (ROBAXIN) 750 MG TABLET    Take 1 tablet by mouth 3 times daily as needed (muscle spasms)    METOPROLOL SUCCINATE (TOPROL XL) 25 MG EXTENDED RELEASE TABLET    Take 1 tablet by mouth nightly    MIRTAZAPINE (REMERON) 30 MG TABLET    Take 1 tablet by mouth nightly    MULTIPLE VITAMINS-MINERALS (MULTIVITAMIN WITH MINERALS) TABLET    Take 1 tablet by mouth daily    NAPROXEN (NAPROSYN) 500 MG TABLET    Take 1 tablet by mouth 2 times daily as needed for Pain    OMEPRAZOLE (PRILOSEC) 20 MG DELAYED RELEASE CAPSULE    Take 1 capsule by mouth Daily    QUETIAPINE (SEROQUEL) 200 MG TABLET    Take 1 tablet by mouth 2 times daily       ALLERGIES     Patient has no known allergies. FAMILY HISTORY     History reviewed. No pertinent family history. SOCIAL HISTORY       Social History     Socioeconomic History    Marital status:      Spouse name: None    Number of children: None    Years of education: None    Highest education level: None   Occupational History    None   Social Needs    Financial resource strain: None    Food insecurity     Worry: None     Inability: None    Transportation needs     Medical: None     Non-medical: None   Tobacco Use    Smoking status: Current Every Day Smoker     Packs/day: 1.00     Years: 14.00     Pack years: 14.00     Types: Cigarettes    Smokeless tobacco: Never Used   Substance and Sexual Activity    Alcohol use: No    Drug use: No    Sexual activity: None   Lifestyle    Physical activity     Days per week: None     Minutes per session: None    Stress: None   Relationships    Social connections     Talks on phone: None     Gets together: None     Attends Zoroastrian service: None     Active member of club or organization: None     Attends meetings of clubs or organizations: None     Relationship status: None    Intimate partner violence     Fear of current or ex partner: None     Emotionally abused: None     Physically abused: None     Forced sexual activity: None   Other Topics Concern    None   Social History Narrative    None       SCREENINGS    Scott Coma Scale  Eye Opening: Spontaneous  Best Verbal Response: Oriented  Best Motor Response: Obeys commands  Scott Coma Scale Score: 15        PHYSICAL EXAM    (up to 7 for level 4, 8 or more for level 5)     ED Triage Vitals [10/09/20 1918]   BP Temp Temp Source Pulse Resp SpO2 Height Weight   (!) 157/101 99 °F (37.2 °C) Oral 126 20 99 % 5' 9\" (1.753 m) 182 lb (82.6 kg)       Physical Exam  Vitals signs and nursing note reviewed. Constitutional:       General: He is not in acute distress. Appearance: Normal appearance. He is not ill-appearing, toxic-appearing or diaphoretic. HENT:      Head: Normocephalic.       Right Ear: Tympanic membrane normal.      Left Ear: Tympanic membrane normal.      Nose: Nose normal. No congestion or rhinorrhea. Mouth/Throat:      Mouth: Mucous membranes are moist.      Pharynx: Oropharynx is clear. No posterior oropharyngeal erythema. Eyes:      Extraocular Movements: Extraocular movements intact. Conjunctiva/sclera: Conjunctivae normal.      Pupils: Pupils are equal, round, and reactive to light. Neck:      Musculoskeletal: Normal range of motion and neck supple. No neck rigidity. Cardiovascular:      Rate and Rhythm: Regular rhythm. Tachycardia present. Heart sounds: Normal heart sounds. Pulmonary:      Effort: Pulmonary effort is normal.      Breath sounds: Normal breath sounds. Abdominal:      Palpations: Abdomen is soft. Tenderness: There is no abdominal tenderness. Musculoskeletal: Normal range of motion. Lymphadenopathy:      Cervical: No cervical adenopathy. Skin:     General: Skin is warm and dry. Neurological:      Mental Status: He is alert and oriented to person, place, and time. Psychiatric:         Mood and Affect: Mood normal.         Behavior: Behavior normal.         DIAGNOSTIC RESULTS     EKG: All EKG's are interpreted by the Emergency Department Physician who either signs or Co-signs this chart in the absence of a cardiologist.    None    RADIOLOGY:   Non-plain film images such as CT, Ultrasound and MRI are read by the radiologist. Plain radiographic images are visualized andpreliminarily interpreted by the emergency physician with the below findings:    CT abd/pelvis - See Below    Interpretationper the Radiologist below, if available at the time of this note:    CT ABDOMEN PELVIS WO CONTRAST Additional Contrast? None   Final Result   1. No acute CT findings in the abdomen or pelvis. 2.  Multiple stable incidental findings, as described above. XR CHEST PORTABLE   Final Result      No acute cardiopulmonary findings.             ED BEDSIDE ULTRASOUND:   Performed by ED Physician - none    LABS:  Labs Reviewed   CBC WITH AUTO DIFFERENTIAL - Abnormal; Notable for the following components:       Result Value    WBC 20.1 (*)     RDW 17.3 (*)     MPV 11.3 (*)     Neutrophils Absolute 16.1 (*)     Lymphocytes Absolute 1.3 (*)     Monocytes Absolute 2.5 (*)     All other components within normal limits    Narrative:     results repeated and verified  Performed at:  [address] [place] [address] Laboratory  [address] [place], [place] [address], [address]   Phone [phone]   COMPREHENSIVE METABOLIC PANEL W/ REFLEX TO MG FOR LOW K - Abnormal; Notable for the following components:    Sodium 129 (*)     Chloride 82 (*)     CO2 15 (*)     Anion Gap 32 (*)     Glucose 133 (*)     BUN 59 (*)     CREATININE 2.1 (*)     GFR Non- 32 (*)     GFR  38 (*)     Total Protein 9.0 (*)     Alb 5.1 (*)     Total Bilirubin 1.3 (*)     ALT 46 (*)     AST 43 (*)     All other components within normal limits    Narrative:     Performed at:  [address] [place] [address] Laboratory  [address] [place] [address]  [place], [address]   Phone [phone]   LIPASE - Abnormal; Notable for the following components:    Lipase 68.0 (*)     All other components within normal limits    Narrative:     Performed at:  [address] [place] [address] Laboratory  [address] [place], [place] [address], [address]   Phone [phone] A THROAT    Narrative:     Performed at:  [address] [place] [address] Laboratory  [address] [place], [place] [address], [address]   Phone [phone]   RAPID INFLUENZA A/B ANTIGENS    Narrative:     Performed at:  [address] [place] [address] Laboratory  [address] [place], [place] [address], [address]   Phone [phone]       All other labs were within normal range or not returned as of this dictation.     EMERGENCY DEPARTMENT COURSE and DIFFERENTIAL DIAGNOSIS/MDM:   Vitals:    Vitals:    10/09/20 1918 10/09/20 2115 10/09/20 2145 10/09/20 2230   BP: (!) 157/101 (!) 169/103 (!) 121/98    Pulse: 126 116 120 124   Resp: 20  19    Temp: 99 °F (37.2 °C)      TempSrc: Oral      SpO2: 99% 98% 98% 94%   Weight: 182 lb (82.6 kg)      Height: 5' 9\" (1.753 m)              CRITICAL CARE TIME   Total Critical Care time was 0 minutes, excluding separatelyreportable procedures. There was a high probability ofclinically significant/life threatening deterioration in the patient's condition which required my urgent intervention. CONSULTS:  None    PROCEDURES:  None    PROGRESS NOTES:    Will obtain IV, give 1 liter fluids, will obtain nasal swabs, including COVID, give phenergan, give decadron. Pt started to feel better but kept calling his family to come get him. They also called about 10 times and came here twice to come get him cause he kept telling them he was ready. Pt with 20K WBC and will get CT to rule out any other pathology. Lipase normal. COVID sent out. Pt able to tolerate liquids and drinking in the room. COVID pending. Pt with Cr 2.1 but was given over 2 liters of fluids. Pt was dehydrated from persistent vomiting. Possible COVID. Otherwise back to baseline and will give nausea meds for home. FINAL IMPRESSION      1. Non-intractable vomiting with nausea, unspecified vomiting type New Problem   2. Generalized weakness New Problem         DISPOSITION/PLAN   DISPOSITION        PATIENT REFERRED TO:  Claudeen Fuse, APRN  [address]  [phone]    Schedule an appointment as soon as possible for a visit   with PCP in next 2-3 days if symptoms continue.  Pt to return to ED if symptoms worsen      DISCHARGE MEDICATIONS:  New Prescriptions    ONDANSETRON (ZOFRAN ODT) 4 MG DISINTEGRATING TABLET    Take 1 tablet by mouth every 8 hours as needed for Nausea or Vomiting       (Please note that portions of this note were completed with a voice recognition program.  Efforts were made to edit the dictations but occasionallywords are mis-transcribed.)    Arianne Downs DO (electronically signed)  Attending Emergency Physician          Arianne Downs DO  10/10/20 9315

## 2020-10-12 LAB — SARS-COV-2, NAA: NOT DETECTED

## 2020-11-03 RX ORDER — METHOCARBAMOL 750 MG/1
TABLET, FILM COATED ORAL
Qty: 90 TABLET | Refills: 3 | OUTPATIENT
Start: 2020-11-03

## 2020-11-10 RX ORDER — GABAPENTIN 400 MG/1
800 CAPSULE ORAL 4 TIMES DAILY
Qty: 120 CAPSULE | Refills: 2 | OUTPATIENT
Start: 2020-11-10

## 2020-12-08 RX ORDER — GABAPENTIN 400 MG/1
800 CAPSULE ORAL 4 TIMES DAILY
Qty: 120 CAPSULE | Refills: 2 | Status: SHIPPED | OUTPATIENT
Start: 2020-12-08 | End: 2021-02-23 | Stop reason: ALTCHOICE

## 2020-12-08 RX ORDER — GABAPENTIN 400 MG/1
800 CAPSULE ORAL 4 TIMES DAILY
Qty: 120 CAPSULE | Refills: 2 | OUTPATIENT
Start: 2020-12-08

## 2020-12-22 ENCOUNTER — NURSE TRIAGE (OUTPATIENT)
Dept: OTHER | Facility: CLINIC | Age: 70
End: 2020-12-22

## 2020-12-22 RX ORDER — DIPHENOXYLATE HYDROCHLORIDE AND ATROPINE SULFATE 2.5; .025 MG/1; MG/1
1 TABLET ORAL 4 TIMES DAILY PRN
Qty: 30 TABLET | Refills: 0 | Status: SHIPPED | OUTPATIENT
Start: 2020-12-22 | End: 2021-01-21

## 2020-12-22 NOTE — TELEPHONE ENCOUNTER
Reason for Disposition   Patient wants to be seen    Answer Assessment - Initial Assessment Questions  1. DIARRHEA SEVERITY: \"How bad is the diarrhea? \" \"How many extra stools have you had in the past 24 hours than normal?\"     - NO DIARRHEA (SCALE 0)    - MILD (SCALE 1-3): Few loose or mushy BMs; increase of 1-3 stools over normal daily number of stools; mild increase in ostomy output. -  MODERATE (SCALE 4-7): Increase of 4-6 stools daily over normal; moderate increase in ostomy output. * SEVERE (SCALE 8-10; OR 'WORST POSSIBLE'): Increase of 7 or more stools daily over normal; moderate increase in ostomy output; incontinence. Mild   2. ONSET: \"When did the diarrhea begin? \"       This morning   3. BM CONSISTENCY: \"How loose or watery is the diarrhea? \"       Loose   4. VOMITING: \"Are you also vomiting? \" If so, ask: \"How many times in the past 24 hours? \"       no  5. ABDOMINAL PAIN: [place] you having any abdominal pain? \" If yes: \"What does it feel like? \" (e.g., crampy, dull, intermittent, constant)       Not anymore   6. ABDOMINAL PAIN SEVERITY: If present, ask: \"How bad is the pain? \"  (e.g., Scale 1-10; mild, moderate, or severe)    - MILD (1-3): doesn't interfere with normal activities, abdomen soft and not tender to touch     - MODERATE (4-7): interferes with normal activities or awakens from sleep, tender to touch     - SEVERE (8-10): excruciating pain, doubled over, unable to do any normal activities        None   7. ORAL INTAKE: If vomiting, \"Have you been able to drink liquids? \" \"How much fluids have you had in the past 24 hours? \"      Yes   8. HYDRATION: \"Any signs of dehydration? \" (e.g., dry mouth [not just dry lips], too weak to stand, dizziness, new weight loss) \"When did you last urinate? \"      no  9. EXPOSURE: \"Have you traveled to a foreign country recently? \" \"Have you been exposed to anyone with diarrhea? \" \"Could you have eaten any food that was spoiled? \"      no  10.  ANTIBIOTIC USE: \"Are you

## 2021-02-03 ENCOUNTER — APPOINTMENT (OUTPATIENT)
Dept: CT IMAGING | Facility: HOSPITAL | Age: 71
End: 2021-02-03
Payer: MEDICARE

## 2021-02-03 ENCOUNTER — HOSPITAL ENCOUNTER (EMERGENCY)
Facility: HOSPITAL | Age: 71
Discharge: HOME | End: 2021-02-03
Attending: EMERGENCY MEDICINE
Payer: MEDICARE

## 2021-02-03 VITALS
RESPIRATION RATE: 16 BRPM | BODY MASS INDEX: 25.18 KG/M2 | HEART RATE: 85 BPM | SYSTOLIC BLOOD PRESSURE: 153 MMHG | WEIGHT: 170 LBS | HEIGHT: 69 IN | OXYGEN SATURATION: 92 % | DIASTOLIC BLOOD PRESSURE: 90 MMHG | TEMPERATURE: 98 F

## 2021-02-03 DIAGNOSIS — G89.29 OTHER CHRONIC PAIN: ICD-10-CM

## 2021-02-03 DIAGNOSIS — S39.012A LUMBOSACRAL STRAIN, INITIAL ENCOUNTER: ICD-10-CM

## 2021-02-03 DIAGNOSIS — R41.89 UNRESPONSIVE EPISODE: Primary | ICD-10-CM

## 2021-02-03 DIAGNOSIS — M62.838 SPASM OF MUSCLE: ICD-10-CM

## 2021-02-03 DIAGNOSIS — E16.2 HYPOGLYCEMIA: ICD-10-CM

## 2021-02-03 DIAGNOSIS — S32.010A COMPRESSION FRACTURE OF L1 VERTEBRA, INITIAL ENCOUNTER (HCC): ICD-10-CM

## 2021-02-03 LAB
ALBUMIN SERPL-MCNC: 4.4 G/DL (ref 3.4–4.8)
ALP BLD-CCNC: 39 U/L (ref 25–100)
ALT SERPL-CCNC: 22 U/L (ref 4–36)
AMMONIA: 113 MCG/DL (ref 27–102)
AMPHETAMINE SCREEN, URINE: ABNORMAL
ANION GAP SERPL CALCULATED.3IONS-SCNC: 13 MMOL/L (ref 3–16)
AST SERPL-CCNC: 26 U/L (ref 8–33)
BARBITURATE SCREEN URINE: ABNORMAL
BASOPHILS ABSOLUTE: 0.1 K/UL (ref 0–0.1)
BASOPHILS RELATIVE PERCENT: 0.5 %
BENZODIAZEPINE SCREEN, URINE: ABNORMAL
BILIRUB SERPL-MCNC: 0.4 MG/DL (ref 0.3–1.2)
BILIRUBIN DIRECT: <0.2 MG/DL (ref 0–0.2)
BILIRUBIN URINE: NEGATIVE
BILIRUBIN, INDIRECT: NORMAL MG/DL (ref 0.2–0.8)
BLOOD, URINE: NEGATIVE
BUN BLDV-MCNC: 11 MG/DL (ref 6–20)
CALCIUM SERPL-MCNC: 9.3 MG/DL (ref 8.5–10.5)
CANNABINOID SCREEN URINE: ABNORMAL
CHLORIDE BLD-SCNC: 99 MMOL/L (ref 98–107)
CHP ED QC CHECK: NORMAL
CLARITY: CLEAR
CO2: 25 MMOL/L (ref 20–30)
COCAINE METABOLITE SCREEN URINE: ABNORMAL
COLOR: YELLOW
CREAT SERPL-MCNC: 1.2 MG/DL (ref 0.4–1.2)
EOSINOPHILS ABSOLUTE: 0.2 K/UL (ref 0–0.4)
EOSINOPHILS RELATIVE PERCENT: 1.5 %
ETHANOL: NORMAL MG/DL (ref 0–0.08)
GFR AFRICAN AMERICAN: >59
GFR NON-AFRICAN AMERICAN: >59
GLUCOSE BLD-MCNC: 111 MG/DL (ref 74–106)
GLUCOSE BLD-MCNC: 144 MG/DL
GLUCOSE BLD-MCNC: 144 MG/DL (ref 74–106)
GLUCOSE URINE: NEGATIVE MG/DL
HCT VFR BLD CALC: 46.1 % (ref 40–54)
HEMOGLOBIN: 14.3 G/DL (ref 13–18)
IMMATURE GRANULOCYTES #: 0.2 K/UL
IMMATURE GRANULOCYTES %: 1.6 % (ref 0–5)
KETONES, URINE: NEGATIVE MG/DL
LEUKOCYTE ESTERASE, URINE: NEGATIVE
LYMPHOCYTES ABSOLUTE: 1.6 K/UL (ref 1.5–4)
LYMPHOCYTES RELATIVE PERCENT: 11 %
Lab: ABNORMAL
MCH RBC QN AUTO: 30 PG (ref 27–32)
MCHC RBC AUTO-ENTMCNC: 31 G/DL (ref 31–35)
MCV RBC AUTO: 96.6 FL (ref 80–100)
METHADONE SCREEN, URINE: ABNORMAL
METHAMPHETAMINE, URINE: ABNORMAL
MICROSCOPIC EXAMINATION: NORMAL
MONOCYTES ABSOLUTE: 1.2 K/UL (ref 0.2–0.8)
MONOCYTES RELATIVE PERCENT: 8.3 %
NEUTROPHILS ABSOLUTE: 10.8 K/UL (ref 2–7.5)
NEUTROPHILS RELATIVE PERCENT: 77.1 %
NITRITE, URINE: NEGATIVE
OPIATE SCREEN URINE: ABNORMAL
PDW BLD-RTO: 14.4 % (ref 11–16)
PERFORMED ON: ABNORMAL
PH UA: 7.5 (ref 5–8)
PHENCYCLIDINE SCREEN URINE: ABNORMAL
PLATELET # BLD: 330 K/UL (ref 150–400)
PMV BLD AUTO: 10.2 FL (ref 6–10)
POTASSIUM REFLEX MAGNESIUM: 4.5 MMOL/L (ref 3.4–5.1)
PROPOXYPHENE SCREEN, URINE: ABNORMAL
PROTEIN UA: NEGATIVE MG/DL
RBC # BLD: 4.77 M/UL (ref 4.5–6)
REASON FOR REJECTION: NORMAL
REJECTED TEST: NORMAL
SODIUM BLD-SCNC: 137 MMOL/L (ref 136–145)
SPECIFIC GRAVITY UA: 1.02 (ref 1–1.03)
TOTAL PROTEIN: 8 G/DL (ref 6.4–8.3)
TRICYCLIC, URINE: POSITIVE
TROPONIN: <0.3 NG/ML
UR OXYCODONE RAPID SCREEN: ABNORMAL
URINE REFLEX TO CULTURE: NORMAL
URINE TYPE: NORMAL
UROBILINOGEN, URINE: 1 E.U./DL
WBC # BLD: 14.1 K/UL (ref 4–11)

## 2021-02-03 PROCEDURE — 80048 BASIC METABOLIC PNL TOTAL CA: CPT

## 2021-02-03 PROCEDURE — 85025 COMPLETE CBC W/AUTO DIFF WBC: CPT

## 2021-02-03 PROCEDURE — 72131 CT LUMBAR SPINE W/O DYE: CPT

## 2021-02-03 PROCEDURE — 99283 EMERGENCY DEPT VISIT LOW MDM: CPT

## 2021-02-03 PROCEDURE — 82140 ASSAY OF AMMONIA: CPT

## 2021-02-03 PROCEDURE — 80076 HEPATIC FUNCTION PANEL: CPT

## 2021-02-03 PROCEDURE — 84484 ASSAY OF TROPONIN QUANT: CPT

## 2021-02-03 PROCEDURE — 80307 DRUG TEST PRSMV CHEM ANLYZR: CPT

## 2021-02-03 PROCEDURE — 6370000000 HC RX 637 (ALT 250 FOR IP): Performed by: EMERGENCY MEDICINE

## 2021-02-03 PROCEDURE — 2580000003 HC RX 258: Performed by: EMERGENCY MEDICINE

## 2021-02-03 PROCEDURE — 81003 URINALYSIS AUTO W/O SCOPE: CPT

## 2021-02-03 PROCEDURE — 93005 ELECTROCARDIOGRAM TRACING: CPT

## 2021-02-03 PROCEDURE — 82077 ASSAY SPEC XCP UR&BREATH IA: CPT

## 2021-02-03 PROCEDURE — 36415 COLL VENOUS BLD VENIPUNCTURE: CPT

## 2021-02-03 PROCEDURE — 70450 CT HEAD/BRAIN W/O DYE: CPT

## 2021-02-03 RX ORDER — HYDROCODONE BITARTRATE AND ACETAMINOPHEN 5; 325 MG/1; MG/1
1 TABLET ORAL ONCE
Status: COMPLETED | OUTPATIENT
Start: 2021-02-03 | End: 2021-02-03

## 2021-02-03 RX ORDER — 0.9 % SODIUM CHLORIDE 0.9 %
1000 INTRAVENOUS SOLUTION INTRAVENOUS ONCE
Status: COMPLETED | OUTPATIENT
Start: 2021-02-03 | End: 2021-02-03

## 2021-02-03 RX ADMIN — HYDROCODONE BITARTRATE AND ACETAMINOPHEN 1 TABLET: 5; 325 TABLET ORAL at 21:57

## 2021-02-03 RX ADMIN — SODIUM CHLORIDE 1000 ML: 9 INJECTION, SOLUTION INTRAVENOUS at 20:06

## 2021-02-03 ASSESSMENT — PAIN DESCRIPTION - LOCATION: LOCATION: BACK

## 2021-02-03 ASSESSMENT — PAIN DESCRIPTION - FREQUENCY: FREQUENCY: CONTINUOUS

## 2021-02-03 NOTE — ED PROVIDER NOTES
62  ENCOUNTER      Pt Name: Loida Chavira  MRN: [id number]  [place] 1950  Date of evaluation: 2/3/2021  Provider: Lisa Culp DO    CHIEF COMPLAINT       Chief Complaint   Patient presents with    Hypoglycemia    Back Pain         HISTORY OF PRESENT ILLNESS   (Location/Symptom, Timing/Onset, Context/Setting, Quality, Duration, Modifying Factors, Severity)  Note limiting factors. Loida Chavira is a 78 y.o. male with a history of possible seizure episode 3 years ago, bipolar disorder, COPD who presents to the emergency department with complaint of possible seizure-like activity. Patient [place] to arrival when he was having a shaking episode. Wife reports that he was not responsive. Believes he had a seizure. When EMS arrived he was unresponsive and moaning and drooling. Accu-Chek was 52 and was given 10 mL of dextrose. After around a minute patient was back to his baseline. He is A&O x2-3 at this time and able to answer questions. States he is not on any seizure medications. Does not remember the events that happened today. States the last thing he does remember is [place] in his [place] and the next thing he knows he was in the ambulance. Denies history of diabetes. Denies any chest pain, shortness of breath, focal neurological deficits. Appropriate PPE was used including an eye shield, gloves, N95 mask, surgical mask during the entire patient encounter and exam.  If necessary (pt being intubated or aerosolizing equipment in use) a gown was also used. Nursing Notes were reviewed.       PAST MEDICAL HISTORY     Past Medical History:   Diagnosis Date    Bipolar affective ([facility] [address]     Cholesterol blood decreased     Chronic back pain     COPD (chronic obstructive pulmonary disease) (HCC)     Depression     DJD (degenerative joint disease)     Environmental allergies     GERD (gastroesophageal reflux disease)     Hypertension     Insomnia     Seizures ([facility] [address]     patient was told a long time ago that he had a seizure         SURGICAL HISTORY       Past Surgical History:   Procedure Laterality Date    APPENDECTOMY      BACK SURGERY      CHOLECYSTECTOMY      HAND SURGERY Right 2014         CURRENT MEDICATIONS       Previous Medications    ALBUTEROL SULFATE  (90 BASE) MCG/ACT INHALER    Inhale 2 puffs into the lungs every 6 hours as needed for Wheezing    BUDESONIDE-FORMOTEROL (SYMBICORT) 160-4.5 MCG/ACT AERO    Inhale 2 puffs into the lungs 2 times daily as needed (episodic wheezing)    CALCIUM-VITAMIN D (OSCAL-500) 500-200 MG-UNIT PER TABLET    Take 1 tablet by mouth daily Indications: calcium 500 with vit D 125mg    CETIRIZINE (ZYRTEC) 10 MG TABLET    Take 1 tablet by mouth daily as needed for Allergies    CHOLECALCIFEROL (VITAMIN D3) 50 MCG (2000 UT) CAPS    Take 1 capsule by mouth daily    CYANOCOBALAMIN (B-12) 2500 MCG SUBL    Place 1 tablet under the tongue daily    DULOXETINE (CYMBALTA) 60 MG EXTENDED RELEASE CAPSULE    Take 1 capsule by mouth 2 times daily    FLUTICASONE (FLONASE) 50 MCG/ACT NASAL SPRAY    1-2 sprays by Nasal route daily as needed for Rhinitis    GABAPENTIN (NEURONTIN) 400 MG CAPSULE    Take 2 capsules by mouth 4 times daily for 30 days.     METHOCARBAMOL (ROBAXIN) 750 MG TABLET    Take 1 tablet by mouth 3 times daily as needed (muscle spasms)    METOPROLOL SUCCINATE (TOPROL XL) 25 MG EXTENDED RELEASE TABLET    Take 1 tablet by mouth nightly    MIRTAZAPINE (REMERON) 30 MG TABLET    Take 1 tablet by mouth nightly    MULTIPLE VITAMINS-MINERALS (MULTIVITAMIN WITH MINERALS) TABLET    Take 1 tablet by mouth daily    NAPROXEN (NAPROSYN) 500 MG TABLET    Take 1 tablet by mouth 2 times daily as needed for Pain    OMEPRAZOLE (PRILOSEC) 20 MG DELAYED RELEASE CAPSULE    Take 1 capsule by mouth Daily    ONDANSETRON (ZOFRAN ODT) 4 MG DISINTEGRATING TABLET    Take 1 tablet by mouth every 8 hours as needed for Nausea or Vomiting    QUETIAPINE (SEROQUEL) 200 MG TABLET    Take 1 tablet by mouth 2 times daily       ALLERGIES     Patient has no known allergies. FAMILY HISTORY     No family history on file.        SOCIAL HISTORY       Social History     Socioeconomic History    Marital status:      Spouse name: Not on file    Number of children: Not on file    Years of education: Not on file    Highest education level: Not on file   Occupational History    Not on file   Social Needs    Financial resource strain: Not on file    Food insecurity     Worry: Not on file     Inability: Not on file    Transportation needs     Medical: Not on file     Non-medical: Not on file   Tobacco Use    Smoking status: Current Every Day Smoker     Packs/day: 1.00     Years: 14.00     Pack years: 14.00     Types: Cigarettes    Smokeless tobacco: Never Used   Substance and Sexual Activity    Alcohol use: No    Drug use: No    Sexual activity: Not on file   Lifestyle    Physical activity     Days per week: Not on file     Minutes per session: Not on file    Stress: Not on file   Relationships    Social connections     Talks on phone: Not on file     Gets together: Not on file     Attends Confucianist service: Not on file     Active member of club or organization: Not on file     Attends meetings of clubs or organizations: Not on file     Relationship status: Not on file    Intimate partner violence     Fear of current or ex partner: Not on file     Emotionally abused: Not on file     Physically abused: Not on file     Forced sexual activity: Not on file   Other Topics Concern    Not on file   Social History Narrative    Not on file       SCREENINGS               Review of Systems  Constitutional:  Denies fever, chills  Eyes: denies eye problems  HEENT: denies sore throat or ear pain  Respiratory: denies cough or shortness of breath  Cardiovascular: denies chest pain, palpitations  GI: denies abdominal pain, nausea, vomiting, or diarrhea  Musculoskeletal: denies joint pain  Skin: denies rash  Neurologic: Reports seizure activity, denies focal weakness or sensory changes    Except as noted above the remainder of the review of systems was reviewed and negative. PHYSICAL EXAM    (up to 7 for level 4, 8 or more for level 5)     ED Triage Vitals   BP Temp Temp src Pulse Resp SpO2 Height Weight   -- -- -- -- -- -- -- --       General appearance: well-developed, well-nourished, no acute distress, nontoxic appearance  HENT: normocephalic, atraumatic, oropharynx moist, nares patent  Eyes: PERRLA, EOMI, conjunctiva normal  Neck: normal range of motion, no tenderness, trachea midline, no stridor  Respiratory: normal breath sounds, non labored breathing pattern  Cardiovascular: normal heart rate, normal rhythm  GI: nontender, bowel sounds normal, soft, nondistended, no pulsatile masses  Musculoskeletal: no edema, intact distal pulses, no clubbing, cyanosis. Good range of motion  Back: no tenderness  Integument: warm, dry, no erythema, no rash, < 2 second cap refill  Neurologic: alert and oriented ×3, no focal deficits appreciated     NIH STROKE SCALE    Time Performed:  18:45    1a. Level of consciousness:  0 - alert; keenly responsive  1b. Level of consciousness questions:  0 - answers both questions correctly  1c. Level of consciousness questions:  0 - performs both tasks correctly  2. Best Gaze:  0 - normal  3. Visual:  0 - no visual loss  4. Facial Palsy:  0 - normal symmetric movement  5a. Motor left arm:  0 - no drift, limb holds 90 (or 45) degrees for full 10 seconds  5b. Motor right arm:  0 - no drift, limb holds 90 (or 45) degrees for full 10 seconds  6a. Motor left le - no drift; leg holds 30 degree position for full 5 seconds  6b. Motor right le - no drift; leg holds 30 degree position for full 5 seconds  7. Limb Ataxia:  0 - absent  8. Sensory:  0 - normal; no sensory loss  9. Best Language:  0 - no aphasia, normal  10. Dysarthria:  0 - normal  11. Extinction and Inattention:  0 - no abnormality    TOTAL:  0      DIAGNOSTIC RESULTS     EKG:     All EKG's are interpreted by the Emergency Department Physician who either signs or Co-signs this chart in the absence of a cardiologist.      RADIOLOGY:   Interpretation per the Radiologist below, if available at the time of this note:    CT Head WO Contrast    (Results Pending)         LABS:  Labs Reviewed   CBC WITH AUTO DIFFERENTIAL   BASIC METABOLIC PANEL W/ REFLEX TO MG FOR LOW K   ETHANOL   TROPONIN   DRUG SCREEN MULTI URINE   POCT GLUCOSE       All other labs were within normal range or not returned as of this dictation. EMERGENCY DEPARTMENT COURSE and DIFFERENTIAL DIAGNOSIS/MDM:   Vitals:    Vitals:    02/03/21 1844   BP: (!) 125/50   Pulse: 86   Resp: 16   Temp: 98 °F (36.7 °C)   SpO2: 91%   Weight: 170 lb (77.1 kg)   Height: 5' 9\" (1.753 m)         MDM  82-year-old male presents the emergency department with a episode of low blood sugar, possible seizure-like activity. Vital signs stable. Physical exam as above. Neurological exam intact. NIH score 0. Does not appear to be suffering from strokelike symptoms. Reports possible seizure episode in the past but no obvious medical history of this seen in our electronic medical record. Does show history of alcohol intoxication patient denies history of withdrawal seizures. Will obtain tox screening labs, Accu-Chek, CT head and monitor. Patient will be endorsed oncoming physician Dr. Donell Montes De Oca for final disposition. CONSULTS:  None      FINAL IMPRESSION      1. Unresponsive episode    2. Hypoglycemia          DISPOSITION/PLAN   DISPOSITION        PATIENT REFERRED TO:  No follow-up provider specified.     DISCHARGE MEDICATIONS:  New Prescriptions    No medications on file          (Please note that portions of this note were completed with a voice recognition program.  Efforts were made to edit the dictations but occasionally words are mis-transcribed.)    Floyd Mcdowell DO (electronically signed)  Attending Emergency Physician      Floyd Mcdowell DO  02/03/21 [id number]

## 2021-02-03 NOTE — ED TRIAGE NOTES
Pt arrived by [facility]. C/o hypoglycemia. Pt result 52 on arrival of EMS. Pt received 10g dextrose. BG resulted 199 after dextrose. Pt wife states pt had seizure. Pt very confused. Pt unable to state where he last was. Pt keeps asking \"what happened to me\". Pt states chronic back pain worse, in mid lower back.

## 2021-02-04 NOTE — ED PROVIDER NOTES
Emergency Department Encounter  Location: [address] [place] [address] Court    Patient: Pepito Herron  MRN: [id number]  : 1950  Date of evaluation: 2/3/2021  ED Provider: Greyson Gagnon MD    190  Pepito Herron was checked out to me by Dr. Raulito Cleveland. Please see his/her initial documentation for details of the patient's initial ED presentation, physical exam and completed studies. In brief, Pepito Herron is a 78 y.o. male that presented to the emergency department that was brought to the emergency department by EMS secondary to altered mental status. Patient's care was turned over at shift change by Dr. Raulito Cleveland pending radiographic evaluation labs reexamination and disposition. Review of patient's past medical history has been seen for similar episodes in the past.  Patient states that [place] he started to feel lightheaded and does not recall the event until EMS arrived. When EMS arrived they noted the patient's blood glucose was 52 and gave him 10 g of dextrose at which time patient returned to his baseline. Patient denies any head injury, chest pain shortness of breath or any focal signs or symptoms. He states that this is occurred several times in the past and is followed by his primary physician for. At shift change patient was alert and oriented x3 in no acute distress and conversing in full sentences. Patient states that he has a known past medical history of chronic back pain has had previous surgery on it and has had some recent falls which she states is normal for him because he loses his footing.     I have reviewed and interpreted all of the currently available lab results and diagnostics from this visit:  Results for orders placed or performed during the hospital encounter of    Basic Metabolic Panel w/ Reflex to MG   Result Value Ref Range    Sodium 137 136 - 145 mmol/L    Potassium reflex Magnesium 4.5 3.4 - 5.1 mmol/L    Chloride 99 98 - 107 mmol/L CO2 25 20 - 30 mmol/L    Anion Gap 13 3 - 16    Glucose 111 (H) 74 - 106 mg/dL    BUN 11 6 - 20 mg/dL    CREATININE 1.2 0.4 - 1.2 mg/dL    GFR Non-African American >59 >59    GFR African American >59 >59    Calcium 9.3 8.5 - 10.5 mg/dL   Ethanol   Result Value Ref Range    Ethanol Lvl None Detected mg/dL   Troponin   Result Value Ref Range    Troponin <0.30 <0.30 ng/mL   Drug Screen, Multiple, Urine   Result Value Ref Range    PCP Screen, Urine Neg Negative <25 ng/mL    Benzodiazepine Screen, Urine Neg Negative <300 ng/mL    Cocaine Metabolite Screen, Urine Neg Negative <300 ng/mL    Amphetamine Screen, Urine Neg Negative <1000 ng/mL    Cannabinoid Scrn, Ur Neg Negative <50 ng/mL    Opiate Scrn, Ur Neg Negative <300 ng/mL    Barbiturate Screen, Ur Neg Negative <848 ng/mL    Tricyclic POSITIVE (A) Negative <300 ng/mL    Methadone Screen, Urine Neg Negative <300 ng/ml    Propoxyphene Screen, Urine Neg Negative <300 ng/mL    Methamphetamine, Urine Neg Negative <1000 ng/mL    UR Oxycodone Rapid Screen Neg Negative <100 ng/mL    Drug Screen Comment: see below    SPECIMEN REJECTION   Result Value Ref Range    Rejected Test CBCWD     Reason for Rejection see below    CBC Auto Differential   Result Value Ref Range    WBC 14.1 (H) 4.0 - 11.0 K/uL    RBC 4.77 4.50 - 6.00 M/uL    Hemoglobin 14.3 13.0 - 18.0 g/dL    Hematocrit 46.1 40.0 - 54.0 %    MCV 96.6 80.0 - 100.0 fL    MCH 30.0 27.0 - 32.0 pg    MCHC 31.0 31.0 - 35.0 g/dL    RDW 14.4 11.0 - 16.0 %    Platelets 373 266 - 686 K/uL    MPV 10.2 (H) 6.0 - 10.0 fL    Neutrophils % 77.1 %    Immature Granulocytes % 1.6 0.0 - 5.0 %    Lymphocytes % 11.0 %    Monocytes % 8.3 %    Eosinophils % 1.5 %    Basophils % 0.5 %    Neutrophils Absolute 10.8 (H) 2.0 - 7.5 K/uL    Immature Granulocytes # 0.2 K/uL    Lymphocytes Absolute 1.6 1.5 - 4.0 K/uL    Monocytes Absolute 1.2 (H) 0.2 - 0.8 K/uL    Eosinophils Absolute 0.2 0.0 - 0.4 K/uL    Basophils Absolute 0.1 0.0 - 0.1 K/uL Hepatic Function Panel   Result Value Ref Range    Total Protein 8.0 6.4 - 8.3 g/dL    Albumin 4.4 3.4 - 4.8 g/dL    Alkaline Phosphatase 39 25 - 100 U/L    ALT 22 4 - 36 U/L    AST 26 8 - 33 U/L    Total Bilirubin 0.4 0.3 - 1.2 mg/dL    Bilirubin, Direct <0.2 0.0 - 0.2 mg/dL    Bilirubin, Indirect see below 0.2 - 0.8 mg/dL   Ammonia   Result Value Ref Range    Ammonia 113 (H) 27 - 102 mcg/dL   Urine Reflex to Culture    Specimen: Urine, clean catch   Result Value Ref Range    Color, UA Yellow Straw/Yellow    Clarity, UA Clear Clear    Glucose, Ur Negative Negative mg/dL    Bilirubin Urine Negative Negative    Ketones, Urine Negative Negative mg/dL    Specific Gravity, UA 1.020 1.005 - 1.030    Blood, Urine Negative Negative    pH, UA 7.5 5.0 - 8.0    Protein, UA Negative Negative mg/dL    Urobilinogen, Urine 1.0 <2.0 E.U./dL    Nitrite, Urine Negative Negative    Leukocyte Esterase, Urine Negative Negative    Microscopic Examination Not Indicated     Urine Type Voided     Urine Reflex to Culture Not Indicated    POCT Glucose   Result Value Ref Range    Glucose 144 mg/dL    QC OK? y    POCT Glucose   Result Value Ref Range    POC Glucose 144 (H) 74 - 106 mg/dl    Performed on ACCU-CHEK      Ct Head Wo Contrast    Result Date: 2/3/2021  HEAD CT SCAN WITHOUT CONTRAST HISTORY:  Seizure. COMPARISON:  Head CT dated 9/6/2020 TECHNIQUE: Noncontrast CT images of the head were obtained. Up-to-date CT equipment and radiation dose reduction techniques were employed. FINDINGS: The ventricles, sulci and cisterns are within normal limits. There are some patchy areas of decreased periventricular white matter attenuation. No intracranial hemorrhage is identified. No mass or mass-effect is identified. The gray-white differentiation is intact. There is no CT evidence of acute ischemia. The visualized paranasal sinuses are clear. 1.  Patchy areas of decreased white matter attenuation.   The findings are nonspecific, but most likely represent chronic small vessel ischemic change. 2.  No evidence of an acute intracranial process. Ct Lumbar Spine Wo Contrast    Result Date: 2/3/2021  CT LUMBAR SPINE: INDICATION: Back pain. TECHNIQUE: Spiral CT images of the lumbar spine were obtained. The images were reformatted in the coronal and sagittal planes. Up-to-date CT equipment and radiation dose reduction techniques were employed. COMPARISON: CT scan of the abdomen and pelvis dated 10/9/2020 FINDINGS: There is a chronic biconcave compression fracture deformity of the L2 vertebral body. There is posterior bulging of the fracture deformity which impresses upon the anterior aspect of the thecal sac. The findings are stable. There is an acute inferior central endplate compression fracture deformity of the L3 vertebral body resulting in approximately 10% maximum height loss. This is new from the prior study. No subluxation is identified. At the T12-L1 disc space level, there is disc space narrowing with vacuum disc phenomena and endplate sclerosis as well as marginal osteophyte formation. There are bilateral pedicle screws identified within the L1 and L3 vertebral bodies which are fixated to posterior fusion rods. There is abnormal lucency identified about the L1 pedicle screws consistent with loosening. This is without change from the prior exam. No hardware failure is identified. The L1-L2, L2-L3, L3-L4 and L4-L5 disc spaces are maintained. At the L5-S1 disc space level, there is a chronic left L5 pars defect. There is a mild disc bulge. The paraspinal soft tissues were reviewed. There is uncomplicated sigmoid colon diverticulosis. There is ectasia of the infrarenal abdominal aorta. There is atherosclerotic ossification the abdominal aorta and common iliac arteries. There is a small hiatal hernia.      1. There is an acute inferior central endplate compression fracture deformity of the L3 vertebral body resulting in approximately 10% maximum

## 2021-02-04 NOTE — ED NOTES
Discharge instructions provided to patient. Disc obtained from radiology for follow up with spine surgeon. PIV removed. Patient verbalizes understanding of d/c plan and follow up care. Patient assisted to wheelchair and taken to family POV at this time with no further needs noted.       Sehela Bullock RN  02/03/21 [id number]

## 2021-02-04 NOTE — ED NOTES
Called sister at [phone] to notify that patient had left his grey zip-up hooded sweatshirt behind in the ER. RN to place into patient belonging bag with patient sticker.       Rosa Granger RN  02/03/21 0465

## 2021-02-08 ENCOUNTER — TELEPHONE (OUTPATIENT)
Dept: PRIMARY CARE CLINIC | Age: 71
End: 2021-02-08

## 2021-02-08 NOTE — TELEPHONE ENCOUNTER
pts sister called requesting that we get pt a wheeled walker (was recently seen in ER)   Refused appt

## 2021-02-17 ENCOUNTER — TELEPHONE (OUTPATIENT)
Dept: PRIMARY CARE CLINIC | Age: 71
End: 2021-02-17

## 2021-02-17 NOTE — TELEPHONE ENCOUNTER
Patient is having back pain and cant do a vv, and wanted to know if there were any appts available before March 12

## 2021-02-23 ENCOUNTER — VIRTUAL VISIT (OUTPATIENT)
Dept: PRIMARY CARE CLINIC | Age: 71
End: 2021-02-23
Payer: MEDICARE

## 2021-02-23 DIAGNOSIS — S32.000A COMPRESSION FRACTURE OF LUMBAR VERTEBRA, INITIAL ENCOUNTER, UNSPECIFIED LUMBAR VERTEBRAL LEVEL: Primary | ICD-10-CM

## 2021-02-23 DIAGNOSIS — G89.29 CHRONIC BACK PAIN, UNSPECIFIED BACK LOCATION, UNSPECIFIED BACK PAIN LATERALITY: ICD-10-CM

## 2021-02-23 DIAGNOSIS — M54.9 CHRONIC BACK PAIN, UNSPECIFIED BACK LOCATION, UNSPECIFIED BACK PAIN LATERALITY: ICD-10-CM

## 2021-02-23 DIAGNOSIS — G89.29 CHRONIC NECK PAIN: ICD-10-CM

## 2021-02-23 DIAGNOSIS — M54.2 CHRONIC NECK PAIN: ICD-10-CM

## 2021-02-23 PROCEDURE — 98967 PH1 ASSMT&MGMT NQHP 11-20: CPT | Performed by: NURSE PRACTITIONER

## 2021-02-23 RX ORDER — METHOCARBAMOL 750 MG/1
750 TABLET, FILM COATED ORAL 3 TIMES DAILY PRN
Qty: 90 TABLET | Refills: 5 | Status: SHIPPED | OUTPATIENT
Start: 2021-02-23 | End: 2022-07-30

## 2021-02-23 RX ORDER — GABAPENTIN 400 MG/1
800 CAPSULE ORAL 4 TIMES DAILY
Qty: 120 CAPSULE | Refills: 2 | Status: SHIPPED | OUTPATIENT
Start: 2021-02-23 | End: 2021-05-20 | Stop reason: SDUPTHER

## 2021-02-23 ASSESSMENT — PATIENT HEALTH QUESTIONNAIRE - PHQ9
2. FEELING DOWN, DEPRESSED OR HOPELESS: 0
SUM OF ALL RESPONSES TO PHQ QUESTIONS 1-9: 0
1. LITTLE INTEREST OR PLEASURE IN DOING THINGS: 0
SUM OF ALL RESPONSES TO PHQ QUESTIONS 1-9: 0
SUM OF ALL RESPONSES TO PHQ9 QUESTIONS 1 & 2: 0
SUM OF ALL RESPONSES TO PHQ QUESTIONS 1-9: 0

## 2021-02-23 ASSESSMENT — ENCOUNTER SYMPTOMS
ABDOMINAL PAIN: 0
EYE REDNESS: 0
RHINORRHEA: 0
CONSTIPATION: 0
DIARRHEA: 0
EYE DISCHARGE: 0
EYE ITCHING: 0
COUGH: 0
NAUSEA: 0
SHORTNESS OF BREATH: 0
SORE THROAT: 0
VOMITING: 0

## 2021-02-23 NOTE — PROGRESS NOTES
Phone Visit conducted per pt request:    Have you seen any other physician or provider since your last visit? Yes    Have you had any other diagnostic tests since your last visit? Yes    Have you changed or stopped any medications since your last visit? No    Colon: pt refuses at this time due to recent injury. SUBJECTIVE:    Patient ID: Bethany Hayes is a 78 y.o. male. Medical History Review  Past Medical, Family, and Social History reviewed. Health Maintenance Due   Topic Date Due    Hepatitis C screen  1950    Colon cancer screen colonoscopy  06/07/2002    Diabetes screen  08/17/2018    Annual Wellness Visit (AWV)  08/30/2020    Shingles Vaccine (2 of 2) 10/27/2020       HPI:   Chief Complaint   Patient presents with    Leg Pain     left, upper thigh. Pt c/o left leg pain. States it began recently after he had a fall. He broke his back. He is unsure if the leg issue is related to the recent fall. He states he cannot raise up or walk by himself. He feels like his sugar may have dropped before he fell. He remembers waking up in the ER. He is having pain in his back and left leg. Patient's medications, allergies, past medical, surgical, social and family histories were reviewed and updated as appropriate. Review of Systems   Constitutional: Negative for chills, fatigue and fever. HENT: Negative for congestion, ear pain, rhinorrhea and sore throat. Eyes: Negative for discharge, redness and itching. Respiratory: Negative for cough and shortness of breath. Cardiovascular: Negative for chest pain, palpitations and leg swelling. Gastrointestinal: Negative for abdominal pain, constipation, diarrhea, nausea and vomiting. Endocrine: Negative for cold intolerance and heat intolerance. Genitourinary: Negative for dysuria. Musculoskeletal: Positive for back pain. Negative for arthralgias and joint swelling. Left leg pain   Skin: Negative for rash and wound. Neurological: Negative for weakness and headaches. Hematological: Negative for adenopathy. Psychiatric/Behavioral: Negative for dysphoric mood and sleep disturbance. The patient is not nervous/anxious. Reviewed and acurate. See MA note. OBJECTIVE:  Alert, voice sounds normal      Results in Past 30 Days  Result Component Current Result Ref Range Previous Result Ref Range   Albumin 4.4 (2/3/2021) 3.4 - 4.8 g/dL Not in Time Range    Alkaline Phosphatase 39 (2/3/2021) 25 - 100 U/L Not in Time Range    ALT 22 (2/3/2021) 4 - 36 U/L Not in Time Range    AST 26 (2/3/2021) 8 - 33 U/L Not in Time Range    BUN 11 (2/3/2021) 6 - 20 mg/dL Not in Time Range    Calcium 9.3 (2/3/2021) 8.5 - 10.5 mg/dL Not in Time Range    Chloride 99 (2/3/2021) 98 - 107 mmol/L Not in Time Range    CO2 25 (2/3/2021) 20 - 30 mmol/L Not in Time Range    CREATININE 1.2 (2/3/2021) 0.4 - 1.2 mg/dL Not in Time Range    GFR  >59 (2/3/2021) >59 Not in Time Range    GFR Non- >59 (2/3/2021) >59 Not in Time Range    Glucose 144 (2/3/2021) mg/dL 111 (H) (2/3/2021) 74 - 106 mg/dL   Potassium reflex Magnesium 4.5 (2/3/2021) 3.4 - 5.1 mmol/L Not in Time Range    Sodium 137 (2/3/2021) 136 - 145 mmol/L Not in Time Range    Total Bilirubin 0.4 (2/3/2021) 0.3 - 1.2 mg/dL Not in Time Range    Total Protein 8.0 (2/3/2021) 6.4 - 8.3 g/dL Not in Time Range      Hemoglobin A1C (%)   Date Value   08/17/2015 5.6     Microscopic Examination (no units)   Date Value   02/03/2021 Not Indicated     LDL Calculated (mg/dL)   Date Value   12/13/2019 87       Lab Results   Component Value Date    WBC 14.1 (H) 02/03/2021    NEUTROABS 10.8 (H) 02/03/2021    HGB 14.3 02/03/2021    HCT 46.1 02/03/2021    MCV 96.6 02/03/2021     02/03/2021     Lab Results   Component Value Date    TSH 1.25 10/25/2018       Prior to Visit Medications    Medication Sig Taking?  Authorizing Provider   ondansetron (ZOFRAN ODT) 4 MG disintegrating tablet Take 1 tablet by mouth every 8 hours as needed for Nausea or Vomiting Yes Tiffany Floyd DO   QUEtiapine (SEROQUEL) 200 MG tablet Take 1 tablet by mouth 2 times daily DYLAN Mae Do   cetirizine (ZYRTEC) 10 MG tablet Take 1 tablet by mouth daily as needed for Allergies Yes DYLAN Santoyo Do   Multiple Vitamins-Minerals (MULTIVITAMIN WITH MINERALS) tablet Take 1 tablet by mouth daily DYLAN Mae Do   Cholecalciferol (VITAMIN D3) 50 MCG (2000 UT) CAPS Take 1 capsule by mouth daily Yes DYLAN Santoyo Do   naproxen (NAPROSYN) 500 MG tablet Take 1 tablet by mouth 2 times daily as needed for Pain Yes DYLAN Santoyo Do   Cyanocobalamin (B-12) 2500 MCG SUBL Place 1 tablet under the tongue daily DYLAN Mae Do   albuterol sulfate  (90 Base) MCG/ACT inhaler Inhale 2 puffs into the lungs every 6 hours as needed for Wheezing Yes DYLAN Santoyo Do   DULoxetine (CYMBALTA) 60 MG extended release capsule Take 1 capsule by mouth 2 times daily DYLAN Mae Do   fluticasone (FLONASE) 50 MCG/ACT nasal spray 1-2 sprays by Nasal route daily as needed for Rhinitis DYLAN Mae Do   budesonide-formoterol (SYMBICORT) 160-4.5 MCG/ACT AERO Inhale 2 puffs into the lungs 2 times daily as needed (episodic wheezing) DYLAN Mae Do   calcium-vitamin D (OSCAL-500) 500-200 MG-UNIT per tablet Take 1 tablet by mouth daily Indications: calcium 500 with vit D 125mg Yes DYLAN Santoyo Do   omeprazole (PRILOSEC) 20 MG delayed release capsule Take 1 capsule by mouth Daily Yes DYLAN Santoyo Do       ASSESSMENT:  No diagnosis found. PLAN:  No orders of the defined types were placed in this encounter. No orders of the defined types were placed in this encounter. There are no Patient Instructions on file for this visit. No follow-ups on file.       Roz Douglass CMA am scribing for and in the presence of DYLAN Gomez on 2/23/2021 at 3:16 PM.      Uriel Schofield Rupal Traylor, personally performed the services described in the documentation as scribed by Priscilla Reynolds CMA, in my presence and it is both accurate and complete.

## 2021-02-25 NOTE — TELEPHONE ENCOUNTER
Patient's sister called to get his pain medication refilled.  She doesn't know the names of them    If you could give her a call back please and thank you

## 2021-02-25 NOTE — TELEPHONE ENCOUNTER
Tried to call back. Looks like gabapentin was sent on 02/23/21 if patient calls back see if that is what they needed. Doesn't look like anything else in the past for pain?

## 2021-03-08 ASSESSMENT — ENCOUNTER SYMPTOMS: BACK PAIN: 1

## 2021-03-18 ENCOUNTER — OFFICE VISIT (OUTPATIENT)
Dept: PRIMARY CARE CLINIC | Age: 71
End: 2021-03-18
Payer: MEDICARE

## 2021-03-18 VITALS
RESPIRATION RATE: 16 BRPM | HEIGHT: 70 IN | BODY MASS INDEX: 27.2 KG/M2 | TEMPERATURE: 98 F | SYSTOLIC BLOOD PRESSURE: 120 MMHG | HEART RATE: 85 BPM | OXYGEN SATURATION: 96 % | WEIGHT: 190 LBS | DIASTOLIC BLOOD PRESSURE: 80 MMHG

## 2021-03-18 DIAGNOSIS — E78.5 HYPERLIPIDEMIA, UNSPECIFIED HYPERLIPIDEMIA TYPE: ICD-10-CM

## 2021-03-18 DIAGNOSIS — J44.9 CHRONIC OBSTRUCTIVE PULMONARY DISEASE, UNSPECIFIED COPD TYPE (HCC): ICD-10-CM

## 2021-03-18 DIAGNOSIS — S32.000A COMPRESSION FRACTURE OF LUMBAR VERTEBRA, INITIAL ENCOUNTER, UNSPECIFIED LUMBAR VERTEBRAL LEVEL: Primary | ICD-10-CM

## 2021-03-18 DIAGNOSIS — G89.29 CHRONIC BACK PAIN, UNSPECIFIED BACK LOCATION, UNSPECIFIED BACK PAIN LATERALITY: ICD-10-CM

## 2021-03-18 DIAGNOSIS — I10 ESSENTIAL HYPERTENSION: ICD-10-CM

## 2021-03-18 DIAGNOSIS — M54.9 CHRONIC BACK PAIN, UNSPECIFIED BACK LOCATION, UNSPECIFIED BACK PAIN LATERALITY: ICD-10-CM

## 2021-03-18 PROCEDURE — 99214 OFFICE O/P EST MOD 30 MIN: CPT | Performed by: NURSE PRACTITIONER

## 2021-03-18 RX ORDER — METOPROLOL SUCCINATE 25 MG/1
25 TABLET, EXTENDED RELEASE ORAL NIGHTLY
COMMUNITY
End: 2021-06-14 | Stop reason: SDUPTHER

## 2021-03-18 RX ORDER — MIRTAZAPINE 30 MG/1
30 TABLET, FILM COATED ORAL NIGHTLY
COMMUNITY

## 2021-03-18 RX ORDER — QUETIAPINE FUMARATE 300 MG/1
300 TABLET, FILM COATED ORAL NIGHTLY
COMMUNITY
End: 2021-06-14 | Stop reason: SDUPTHER

## 2021-03-18 ASSESSMENT — ENCOUNTER SYMPTOMS
RHINORRHEA: 0
SHORTNESS OF BREATH: 0
EYE ITCHING: 0
VOMITING: 0
ABDOMINAL PAIN: 0
DIARRHEA: 0
SORE THROAT: 0
EYE DISCHARGE: 0
EYE REDNESS: 0
COUGH: 0
NAUSEA: 0
CONSTIPATION: 0

## 2021-03-18 NOTE — PATIENT INSTRUCTIONS
· Keep a list of your medicines with you. List all of the prescription medicines, nonprescription medicines, supplements, natural remedies, and vitamins that you take. Tell your healthcare providers who treat you about all of the products you are taking. Your provider can provide you with a form to keep track of them. Just ask. · Follow the directions that come with your medicine, including information about food or alcohol. Make sure you know how and when to take your medicine. Do not take more or less than you are supposed to take. · Keep all medicines out of the reach of children. · Store medicines according to the directions on the label. · Monitor yourself. Learn to know how your body reacts to your new medicine and keep track of how it makes you feel before attempting (If your provider has allowed you to do so) to drive or go to work. · Seek emergency medical attention if you think you have used too much of this medicine. An overdose of any prescription medicine can be fatal. Overdose symptoms may include extreme drowsiness, muscle weakness, confusion, cold and clammy skin, pinpoint pupils, shallow breathing, slow heart rate, fainting, or coma. · Don't share prescription medicines with others, even when they seem to have the same symptoms. What may be good for you may be harmful to others. · If you are no longer taking a prescribed medication and you have pills left please take your pills out of their original containers. Mix crushed pills with an undesirable substance, such as cat litter or used coffee grounds. Put the mixture into a disposable container with a lid, such as an empty margarine tub, or into a sealable bag. Cover up or remove any of your personal information on the empty containers by covering it with black permanent marker or duct tape. Place the sealed container with the mixture, and the empty drug containers, in the trash.    · If you use a medication that is in the form of a patch, dispose of used patches by folding them in half so that the sticky sides meet, and then flushing them down a toilet. They should not be placed in the household trash where children or pets can find them. · If you have any questions, ask your provider or pharmacist for more information. · Be sure to keep all appointments for provider visits or tests. We are committed to providing you with the best care possible. In order to help us achieve these goals please remember to bring all medications, herbal products, and over the counter supplements with you to each visit. If your provider has ordered testing for you, please be sure to follow up with our office if you have not received results within 7 days after the testing took place. *If you receive a survey after visiting one of our offices, please take time to share your experience concerning your physician office visit. These surveys are confidential and no health information about you is shared. We are eager to improve for you and we are counting on your feedback to help make that happen. ips to Help You Stop Smoking       Cigarette smoking is a preventable cause of death in the [place]. If you have thought about quitting but haven't been able to, here are some reasons why you should and some ways to do it. Here's Why   Quitting smoking now can decrease your risk of getting smoking-related illnesses like:   Heart disease   Stroke   Several types of cancer, including:   Lung   Mouth   Esophagus   Larynx   Bladder   Pancreas   Kidney   Chronic lung diseases:   Bronchitis   Emphysema   Asthma   Cataracts   Macular degeneration   Thyroid conditions   Hearing loss   Erectile dysfunction   Dementia   Osteoporosis   Here's How   Once you've decided to quit smoking, set your target quit date a few weeks away.  In the time leading up to your quit day, try some of these ideas offered by the [address] [place] [address] [place] to help you successfully quit smoking. For the best results, work with your doctor. Together, you can test your lung function and compare the results to those of a nonsmoking person. The results can be given to you as your lung age. Finding out your lung age right after having the test done may help you to stop smoking. Your doctor can also discuss with you all of your options and refer you to smoking-cessation support groups. You may wish to use nicotine replacement (gum, patches, inhaler) or one of the prescription medications that have been shown to increase quit rates and prolong abstinence from smoking. But whatever you and your doctor decide on these matters, it will still be you who decides when an how to quit. Here are some techniques:   Switch Brands   Switch to a brand you find distasteful. Change to a brand that is low in tar and nicotine a couple of weeks before your target quit date. This will help change your smoking behavior. However, do not smoke more cigarettes, inhale them more often or more deeply, or place your fingertips over the holes in the filters. All of these actions will increase your nicotine intake, and the idea is to get your body used to functioning without nicotine. Cut Down the Number of Cigarettes You Smoke   Smoke only half of each cigarette. Each day, postpone the lighting of your first cigarette by one hour. Decide you'll only smoke during odd or even hours of the day. Decide beforehand how many cigarettes you'll smoke during the day. For each additional cigarette, give a dollar to your favorite genoveva. Change your eating habits to help you cut down. For example, drink milk, which many people consider incompatible with smoking. End meals or snacks with something that won't lead to a cigarette. Reach for a glass of juice instead of a cigarette for a \"pick-me-up. \"   Remember: Cutting down can help you quit, but it's not a substitute for quitting.  If you're down to about seven cigarettes a day, it's time to set your target quit date, and get ready to stick to it. Don't Smoke \"Automatically\"   Smoke only those cigarettes you really want. Catch yourself before you light up a cigarette out of pure habit. Don't empty your ashtrays. This will remind you of how many cigarettes you've smoked each day, and the sight and the smell of stale cigarettes butts will be very unpleasant. Make yourself aware of each cigarette by using the opposite hand or putting cigarettes in an unfamiliar location or a different pocket to break the automatic reach. If you light up many times during the day without even thinking about it, try to look in a mirror each time you put a match to your cigarette. You may decide you don't need it. Make Smoking Inconvenient   Stop buying cigarettes by the carton. Wait until one pack is empty before you buy another. Stop carrying cigarettes with you at home or at work. Make them difficult to get to. Make Smoking Unpleasant   Smoke only under circumstances that aren't especially pleasurable for you. If you like to smoke with others, smoke alone. Turn your chair to an empty corner and focus only on the cigarette you are smoking and all its many negative effects. Collect all your cigarette butts in one large glass container as a visual reminder of the filth made by smoking. Just Before Quitting   Practice going without cigarettes. Don't think of never smoking again. Think of quitting in terms of one day at a time . Tell yourself you won't smoke today, and then don't. Clean your clothes to rid them of the cigarette smell, which can linger a long time. On the Day You Quit   Throw away all your cigarettes and matches. Hide your lighters and ashtrays. Visit the dentist and have your teeth cleaned to get rid of tobacco stains. Notice how nice they look and resolve to keep them that way.    Make a list of things you'd like to buy for yourself or someone else. Estimate the cost in terms of packs of cigarettes, and put the money aside to buy these presents. Keep very busy on the big day. Go to the movies, exercise, take long walks, or go bike riding. Remind your family and friends that this is your quit date, and ask them to help you over the rough spots of the first couple of days and weeks. Buy yourself a treat or do something special to celebrate. Telephone and Internet Support   Telephone, web-, and computer-based programs can offer you the support that you need to quit and to stay smoke-free. You can find many programs online, like the [employer]'s [place] from Smoking . Immediately After Quitting   Develop a clean, fresh, nonsmoking environment around yourselfat work and at home. Buy yourself flowersyou may be surprised how much you can enjoy their scent now. The first few days after you quit, spend as much free time as possible in places where smoking isn't allowed, such as [address] [place] [address], museums, theaters, department stores, and churches. Drink large quantities of water and fruit juice (but avoid sodas that contain caffeine). Try to avoid alcohol, coffee, and other beverages that you associate with cigarette smoking. Strike up conversation instead of a match for a cigarette. If you miss the sensation of having a cigarette in your hand, play with something elsea pencil, a paper clip, a marble. If you miss having something in your mouth, try toothpicks or a fake cigarette. Thank you for requesting your Continuity of Care Document (CCD) electronically. Please follow the instructions below to securely access your online medical record. [employer] allows you to send messages to your doctor, view your test results, renew your prescriptions, schedule appointments, and more. How Do I Access my CCD? In your Internet browser, go to [email or web address]   Enter your user name and password Click on My medical Record  --> Download Summary --> Enter Password --> Download --> Save or Open Document    Additional Information  If you have questions, please contact your physician practice where you receive care. Remember, [employer] is NOT to be used for urgent needs. For medical emergencies, dial 911.

## 2021-03-18 NOTE — PROGRESS NOTES
Have you seen any other physician or provider since your last visit? Dr. Gamal Cochran    Have you had any other diagnostic tests since your last visit? No    Have you changed or stopped any medications since your last visit? No    SUBJECTIVE:    Patient ID: Kay Garcia is a 78 y.o. male. Medical History Review  Past Medical, Family, and Social History reviewed. Health Maintenance Due   Topic Date Due    Hepatitis C screen  Never done    COVID-19 Vaccine (1) Never done    Colon cancer screen colonoscopy  Never done    Diabetes screen  08/17/2018    Annual Wellness Visit (AWV)  Never done    Shingles Vaccine (2 of 2) 10/27/2020       HPI:   Chief Complaint   Patient presents with    COPD    Gastroesophageal Reflux    Hypertension     Pt f.u on COPD, GERD, HTN. He states he has been seeing Cristiane Faulkner at [facility] for years. He has been taking Seroquel from me and Aleida. His back is better. He is able to walk better. He is taking Gabapentin \"every now and then\". Patient's medications, allergies, past medical, surgical, social and family histories were reviewed and updated as appropriate. Review of Systems   Constitutional: Negative for chills, fatigue and fever. HENT: Negative for congestion, ear pain, rhinorrhea and sore throat. Eyes: Negative for discharge, redness and itching. Respiratory: Negative for cough and shortness of breath. Cardiovascular: Negative for chest pain, palpitations and leg swelling. Gastrointestinal: Negative for abdominal pain, constipation, diarrhea, nausea and vomiting. Endocrine: Negative for cold intolerance and heat intolerance. Genitourinary: Negative for dysuria. Musculoskeletal: Positive for back pain. Negative for arthralgias and joint swelling. Skin: Negative for rash and wound. Neurological: Negative for weakness and headaches. Hematological: Negative for adenopathy. Psychiatric/Behavioral: Negative for dysphoric mood and sleep disturbance. coffee grounds. Put the mixture into a disposable container with a lid, such as an empty margarine tub, or into a sealable bag. Cover up or remove any of your personal information on the empty containers by covering it with black permanent marker or duct tape. Place the sealed container with the mixture, and the empty drug containers, in the trash. · If you use a medication that is in the form of a patch, dispose of used patches by folding them in half so that the sticky sides meet, and then flushing them down a toilet. They should not be placed in the household trash where children or pets can find them. · If you have any questions, ask your provider or pharmacist for more information. · Be sure to keep all appointments for provider visits or tests. We are committed to providing you with the best care possible. In order to help us achieve these goals please remember to bring all medications, herbal products, and over the counter supplements with you to each visit. If your provider has ordered testing for you, please be sure to follow up with our office if you have not received results within 7 days after the testing took place. *If you receive a survey after visiting one of our offices, please take time to share your experience concerning your physician office visit. These surveys are confidential and no health information about you is shared. We are eager to improve for you and we are counting on your feedback to help make that happen. ips to Help You Stop Smoking       Cigarette smoking is a preventable cause of death in the [place]. If you have thought about quitting but haven't been able to, here are some reasons why you should and some ways to do it.    Here's Why   Quitting smoking now can decrease your risk of getting smoking-related illnesses like:   Heart disease   Stroke   Several types of cancer, including:   Lung   Mouth   Esophagus   Larynx   Bladder   Pancreas   Kidney Chronic lung diseases:   Bronchitis   Emphysema   Asthma   Cataracts   Macular degeneration   Thyroid conditions   Hearing loss   Erectile dysfunction   Dementia   Osteoporosis   Here's How   Once you've decided to quit smoking, set your target quit date a few weeks away. In the time leading up to your quit day, try some of these ideas offered by the [address] to help you successfully quit smoking. For the best results, work with your doctor. Together, you can test your lung function and compare the results to those of a nonsmoking person. The results can be given to you as your lung age. Finding out your lung age right after having the test done may help you to stop smoking. Your doctor can also discuss with you all of your options and refer you to smoking-cessation support groups. You may wish to use nicotine replacement (gum, patches, inhaler) or one of the prescription medications that have been shown to increase quit rates and prolong abstinence from smoking. But whatever you and your doctor decide on these matters, it will still be you who decides when an how to quit. Here are some techniques:   Switch Brands   Switch to a brand you find distasteful. Change to a brand that is low in tar and nicotine a couple of weeks before your target quit date. This will help change your smoking behavior. However, do not smoke more cigarettes, inhale them more often or more deeply, or place your fingertips over the holes in the filters. All of these actions will increase your nicotine intake, and the idea is to get your body used to functioning without nicotine. Cut Down the Number of Cigarettes You Smoke   Smoke only half of each cigarette. Each day, postpone the lighting of your first cigarette by one hour. Decide you'll only smoke during odd or even hours of the day. Decide beforehand how many cigarettes you'll smoke during the day.  For each additional cigarette, give a dollar to your favorite genoveva. Change your eating habits to help you cut down. For example, drink milk, which many people consider incompatible with smoking. End meals or snacks with something that won't lead to a cigarette. Reach for a glass of juice instead of a cigarette for a \"pick-me-up. \"   Remember: Cutting down can help you quit, but it's not a substitute for quitting. If you're down to about seven cigarettes a day, it's time to set your target quit date, and get ready to stick to it. Don't Smoke \"Automatically\"   Smoke only those cigarettes you really want. Catch yourself before you light up a cigarette out of pure habit. Don't empty your ashtrays. This will remind you of how many cigarettes you've smoked each day, and the sight and the smell of stale cigarettes butts will be very unpleasant. Make yourself aware of each cigarette by using the opposite hand or putting cigarettes in an unfamiliar location or a different pocket to break the automatic reach. If you light up many times during the day without even thinking about it, try to look in a mirror each time you put a match to your cigarette. You may decide you don't need it. Make Smoking Inconvenient   Stop buying cigarettes by the carton. Wait until one pack is empty before you buy another. Stop carrying cigarettes with you at home or at work. Make them difficult to get to. Make Smoking Unpleasant   Smoke only under circumstances that aren't especially pleasurable for you. If you like to smoke with others, smoke alone. Turn your chair to an empty corner and focus only on the cigarette you are smoking and all its many negative effects. Collect all your cigarette butts in one large glass container as a visual reminder of the filth made by smoking. Just Before Quitting   Practice going without cigarettes. Don't think of never smoking again. Think of quitting in terms of one day at a time .    Tell yourself you won't smoke today, and then don't. Clean your clothes to rid them of the cigarette smell, which can linger a long time. On the Day You Quit   Throw away all your cigarettes and matches. Hide your lighters and ashtrays. Visit the dentist and have your teeth cleaned to get rid of tobacco stains. Notice how nice they look and resolve to keep them that way. Make a list of things you'd like to buy for yourself or someone else. Estimate the cost in terms of packs of cigarettes, and put the money aside to buy these presents. Keep very busy on the big day. Go to the movies, exercise, take long walks, or go bike riding. Remind your family and friends that this is your quit date, and ask them to help you over the rough spots of the first couple of days and weeks. Buy yourself a treat or do something special to celebrate. Telephone and Internet Support   Telephone, web-, and computer-based programs can offer you the support that you need to quit and to stay smoke-free. You can find many programs online, like the [employer] Association's [place] from Smoking . Immediately After Quitting   Develop a clean, fresh, nonsmoking environment around yourselfat work and at home. Buy yourself flowersyou may be surprised how much you can enjoy their scent now. The first few days after you quit, spend as much free time as possible in places where smoking isn't allowed, such as [address] [place] [address], museums, theaters, department stores, and churches. Drink large quantities of water and fruit juice (but avoid sodas that contain caffeine). Try to avoid alcohol, coffee, and other beverages that you associate with cigarette smoking. Strike up conversation instead of a match for a cigarette. If you miss the sensation of having a cigarette in your hand, play with something elsea pencil, a paper clip, a marble. If you miss having something in your mouth, try toothpicks or a fake cigarette.        Thank you for requesting your Continuity of Care Document (CCD) electronically. Please follow the instructions below to securely access your online medical record. [facility] allows you to send messages to your doctor, view your test results, renew your prescriptions, schedule appointments, and more. How Do I Access my CCD? In your Internet browser, go to [email or web address] Enter your user name and password   Click on My medical Record  --> Download Summary --> Enter Password --> Download --> Save or Open Document    Additional Information  If you have questions, please contact your physician practice where you receive care. Remember, [facility] is NOT to be used for urgent needs. For medical emergencies, dial 911. Return in about 3 months (around 6/18/2021). Neo Rashid CMA am scribing for and in the presence of DYLAN Benitez on 3/29/2021 at 10:08 PM.      I, Samm RICHARDSON, personally performed the services described in the documentation as scribed by Tamika Sue CMA, in my presence and it is both accurate and complete.

## 2021-03-23 RX ORDER — OMEPRAZOLE 20 MG/1
20 CAPSULE, DELAYED RELEASE ORAL DAILY
Qty: 30 CAPSULE | Refills: 5 | OUTPATIENT
Start: 2021-03-23

## 2021-03-29 ASSESSMENT — ENCOUNTER SYMPTOMS: BACK PAIN: 1

## 2021-04-12 ENCOUNTER — HOSPITAL ENCOUNTER (OUTPATIENT)
Dept: MRI IMAGING | Facility: HOSPITAL | Age: 71
Discharge: HOME | End: 2021-04-12
Payer: MEDICARE

## 2021-04-12 DIAGNOSIS — M54.50 LOW BACK PAIN, UNSPECIFIED BACK PAIN LATERALITY, UNSPECIFIED CHRONICITY, UNSPECIFIED WHETHER SCIATICA PRESENT: ICD-10-CM

## 2021-04-12 PROCEDURE — 72148 MRI LUMBAR SPINE W/O DYE: CPT

## 2021-05-03 DIAGNOSIS — G89.29 CHRONIC BACK PAIN, UNSPECIFIED BACK LOCATION, UNSPECIFIED BACK PAIN LATERALITY: ICD-10-CM

## 2021-05-03 DIAGNOSIS — M54.2 CHRONIC NECK PAIN: ICD-10-CM

## 2021-05-03 DIAGNOSIS — G89.29 CHRONIC NECK PAIN: ICD-10-CM

## 2021-05-03 DIAGNOSIS — M54.9 CHRONIC BACK PAIN, UNSPECIFIED BACK LOCATION, UNSPECIFIED BACK PAIN LATERALITY: ICD-10-CM

## 2021-05-03 RX ORDER — METOPROLOL SUCCINATE 25 MG/1
TABLET, EXTENDED RELEASE ORAL
Qty: 30 TABLET | Refills: 5 | OUTPATIENT
Start: 2021-05-03

## 2021-05-03 RX ORDER — GABAPENTIN 400 MG/1
800 CAPSULE ORAL 4 TIMES DAILY
Qty: 120 CAPSULE | Refills: 2 | OUTPATIENT
Start: 2021-05-03

## 2021-05-04 RX ORDER — OMEPRAZOLE 20 MG/1
20 CAPSULE, DELAYED RELEASE ORAL DAILY
Qty: 30 CAPSULE | Refills: 5 | OUTPATIENT
Start: 2021-05-04

## 2021-06-02 RX ORDER — OMEPRAZOLE 20 MG/1
20 CAPSULE, DELAYED RELEASE ORAL DAILY
Qty: 30 CAPSULE | Refills: 5 | OUTPATIENT
Start: 2021-06-02

## 2021-06-02 RX ORDER — MULTIVITAMIN WITH FOLIC ACID 400 MCG
TABLET ORAL
Qty: 30 TABLET | Refills: 5 | OUTPATIENT
Start: 2021-06-02

## 2021-06-02 RX ORDER — CHOLECALCIFEROL (VITAMIN D3) 50 MCG
CAPSULE ORAL
Qty: 30 CAPSULE | Refills: 5 | OUTPATIENT
Start: 2021-06-02

## 2021-06-04 RX ORDER — OMEPRAZOLE 20 MG/1
20 CAPSULE, DELAYED RELEASE ORAL DAILY
Qty: 30 CAPSULE | Refills: 5 | OUTPATIENT
Start: 2021-06-04

## 2021-06-04 RX ORDER — MULTIVIT-MIN/IRON FUM/FOLIC AC 7.5 MG-4
1 TABLET ORAL DAILY
Qty: 30 TABLET | Refills: 5 | Status: ON HOLD | OUTPATIENT
Start: 2021-06-04 | End: 2021-10-14

## 2021-06-04 RX ORDER — OMEPRAZOLE 20 MG/1
20 CAPSULE, DELAYED RELEASE ORAL DAILY
Qty: 30 CAPSULE | Refills: 5 | Status: SHIPPED | OUTPATIENT
Start: 2021-06-04

## 2021-06-12 RX ORDER — METOPROLOL SUCCINATE 25 MG/1
TABLET, EXTENDED RELEASE ORAL
Qty: 30 TABLET | Refills: 5 | OUTPATIENT
Start: 2021-06-12

## 2021-06-14 RX ORDER — METOPROLOL SUCCINATE 25 MG/1
TABLET, EXTENDED RELEASE ORAL
Qty: 30 TABLET | Refills: 5 | OUTPATIENT
Start: 2021-06-14

## 2021-06-14 RX ORDER — QUETIAPINE FUMARATE 300 MG/1
300 TABLET, FILM COATED ORAL NIGHTLY
Qty: 30 TABLET | Refills: 0 | Status: SHIPPED | OUTPATIENT
Start: 2021-06-14 | End: 2021-06-23 | Stop reason: SDUPTHER

## 2021-06-14 RX ORDER — METOPROLOL SUCCINATE 25 MG/1
25 TABLET, EXTENDED RELEASE ORAL NIGHTLY
Qty: 30 TABLET | Refills: 3 | Status: SHIPPED | OUTPATIENT
Start: 2021-06-14

## 2021-06-14 RX ORDER — QUETIAPINE FUMARATE 200 MG/1
200 TABLET, FILM COATED ORAL 2 TIMES DAILY
Qty: 60 TABLET | Refills: 5 | OUTPATIENT
Start: 2021-06-14

## 2021-06-22 NOTE — PROGRESS NOTES
Health Maintenance Due This Visit   Colonoscopy Yes- requests Cologuard   Mammogram No   Annual Wellness Visit No   Microalbumin No   HgbA1C No   Diabetic Eye Exam No    House Bill One Due This Visit   PARISH Yes   UDS No   Contract Yes    Chief Complaint   Patient presents with    Medicare AWV     Pt here today for Medicare AWV    He is doing well other than his back. It is starting to get a little better. He was diagnosed with compression fractures. Have you seen any other physician or provider since your last visit yes- Spine Doctor    Have you had any other diagnostic tests since your last visit? Yes- xray    Have you changed or stopped any medications since your last visit? no         Medicare Annual Wellness Visit  Name: Rani Odonnell Date: 2021   MRN: [id number] Sex: Male   Age: 73 y.o. Ethnicity: Non-/Non    : 1950 Race: Ranjit Pike is here for Medicare AWV  His back is a little better from the compression fracture. He has been using a walker. Screenings for behavioral, psychosocial and functional/safety risks, and cognitive dysfunction are all negative except as indicated below. These results, as well as other patient data from the [address] form, are documented in Flowsheets linked to this Encounter. No Known Allergies    Prior to Visit Medications    Medication Sig Taking? Authorizing Provider   QUEtiapine (SEROQUEL) 300 MG tablet Take 1 tablet by mouth nightly Yes DYLAN James   metoprolol succinate (TOPROL XL) 25 MG extended release tablet Take 1 tablet by mouth nightly Yes DYLAN James   Multiple Vitamins-Minerals (MULTIVITAMIN WITH MINERALS) tablet Take 1 tablet by mouth daily Yes DYLAN James   omeprazole (PRILOSEC) 20 MG delayed release capsule Take 1 capsule by mouth Daily Yes DYLAN James   gabapentin (NEURONTIN) 400 MG capsule Take 1 capsule by mouth 4 times daily for 30 days.  Yes Peyman Hi APRN   mirtazapine (REMERON) 30 MG tablet Take 30 mg by mouth nightly Yes Historical Provider, MD   methocarbamol (ROBAXIN) 750 MG tablet Take 1 tablet by mouth 3 times daily as needed (muscle spasms) Yes Serene Prior, APRN   QUEtiapine (SEROQUEL) 200 MG tablet Take 1 tablet by mouth 2 times daily Yes Serene Prior, APRN   cetirizine (ZYRTEC) 10 MG tablet Take 1 tablet by mouth daily as needed for Allergies Yes Serene Prior, APRN   Cholecalciferol (VITAMIN D3) 50 MCG (2000 UT) CAPS Take 1 capsule by mouth daily Yes Serene Prior, APRN   naproxen (NAPROSYN) 500 MG tablet Take 1 tablet by mouth 2 times daily as needed for Pain Yes Serene Prior, APRN   Cyanocobalamin (B-12) 2500 MCG SUBL Place 1 tablet under the tongue daily Yes Serene Prior, APRN   albuterol sulfate  (90 Base) MCG/ACT inhaler Inhale 2 puffs into the lungs every 6 hours as needed for Wheezing Yes Serene Prior, APRN   DULoxetine (CYMBALTA) 60 MG extended release capsule Take 1 capsule by mouth 2 times daily Yes Serene Prior, APRN   fluticasone (FLONASE) 50 MCG/ACT nasal spray 1-2 sprays by Nasal route daily as needed for Rhinitis Yes Serene Prior, APRN   budesonide-formoterol (SYMBICORT) 160-4.5 MCG/ACT AERO Inhale 2 puffs into the lungs 2 times daily as needed (episodic wheezing) Yes Serene Prior, APRN       Past Medical History:   Diagnosis Date    Bipolar affective ([facility]     Cholesterol blood decreased     Chronic back pain     COPD (chronic obstructive pulmonary disease) ([facility]     Depression     DJD (degenerative joint disease)     Environmental allergies     GERD (gastroesophageal reflux disease)     Hypertension     Insomnia     [facility]     patient was told a long time ago that he had a seizure     Past Surgical History:   Procedure Laterality Date    APPENDECTOMY      BACK SURGERY      CHOLECYSTECTOMY      HAND SURGERY Right 2014     No family history on file.   Review of Systems   Constitutional: Negative for chills and fever. HENT: Negative for ear pain and sore throat. Eyes: Negative for pain and visual disturbance. Respiratory: Negative for cough and shortness of breath. Cardiovascular: Negative for chest pain, palpitations and leg swelling. Gastrointestinal: Negative for abdominal pain, nausea and vomiting. Genitourinary: Negative for dysuria and hematuria. Musculoskeletal: Positive for back pain. Negative for joint swelling. Skin: Negative for rash. Neurological: Negative for dizziness and weakness. Psychiatric/Behavioral: Negative for sleep disturbance. CareTeam (Including outside providers/suppliers regularly involved in providing care):   Patient Care Team:  DYLAN Zaragoza as PCP - General (Nurse Practitioner Family)  DYLAN Zaragoza as PCP - [facility] Provider    Wt Readings from Last 3 Encounters:   06/23/21 177 lb 6.4 oz (80.5 kg)   03/18/21 190 lb (86.2 kg)   02/03/21 170 lb (77.1 kg)     Vitals:    06/23/21 1542   BP: 118/80   Site: Left Upper Arm   Position: Sitting   Pulse: 84   Temp: 97.1 °F (36.2 °C)   TempSrc: Temporal   SpO2: 95%   Weight: 177 lb 6.4 oz (80.5 kg)   Height: 5' 9.5\" (1.765 m)     Body mass index is 25.82 kg/m². Based upon direct observation of the patient, evaluation of cognition reveals recent and remote memory intact. Physical Exam  Vitals reviewed. Constitutional:       General: He is not in acute distress. Appearance: He is well-developed. HENT:      Head: Normocephalic. Mouth/Throat:      Pharynx: No oropharyngeal exudate. Eyes:      General: Lids are normal.   Cardiovascular:      Rate and Rhythm: Normal rate and regular rhythm. Heart sounds: Normal heart sounds. Pulmonary:      Effort: Pulmonary effort is normal.      Breath sounds: Normal breath sounds. Abdominal:      General: Bowel sounds are normal. There is no distension. Palpations: Abdomen is soft. Tenderness:  There is no abdominal tenderness. Musculoskeletal:      Cervical back: Neck supple. Lymphadenopathy:      Cervical: No cervical adenopathy. Skin:     General: Skin is warm and dry. Neurological:      Mental Status: He is alert and oriented to person, place, and time. Lab Results   Component Value Date     06/23/2021    K 5.3 06/23/2021    K 4.5 02/03/2021    CL 99 06/23/2021    CO2 25 06/23/2021    GLUCOSE 88 06/23/2021    BUN 9 06/23/2021    CREATININE 1.0 06/23/2021    CALCIUM 9.4 06/23/2021    PROT 7.1 06/23/2021    LABALBU 4.0 06/23/2021    BILITOT <0.2 06/23/2021    ALT 10 06/23/2021    AST 16 06/23/2021       Hemoglobin A1C (%)   Date Value   08/17/2015 5.6     Microscopic Examination (no units)   Date Value   02/03/2021 Not Indicated     LDL Calculated (mg/dL)   Date Value   06/23/2021 106         Lab Results   Component Value Date    WBC 8.4 06/23/2021    NEUTROABS 5.8 06/23/2021    HGB 13.5 06/23/2021    HCT 42.8 06/23/2021    MCV 97.7 06/23/2021     06/23/2021       Lab Results   Component Value Date    TSH 1.25 10/25/2018       Patient's complete Health Risk Assessment and screening values have been reviewed and are found in Flowsheets. The following problems were reviewed today and where indicated follow up appointments were made and/or referrals ordered.     Positive Risk Factor Screenings with Interventions:     Fall Risk:  2 or more falls in past year?: (!) yes  Fall with injury in past year?: no  Fall Risk Interventions:    · Home safety tips provided     Depression:  PHQ-2 Score: 5  PHQ-9 Total Score: 5    Severity:1-4 = minimal depression, 5-9 = mild depression, 10-14 = moderate depression, 15-19 = moderately severe depression, 20-27 = severe depression  Depression Interventions:  · Patient declines any further evaluation/treatment for this issue     Substance History:  Social History     Tobacco History     Smoking Status  Current Every Day Smoker Smoking Frequency  1 pack/day for 14 years (14 pk yrs) Smoking Tobacco Type  Cigarettes    Smokeless Tobacco Use  Never Used          Alcohol History     Alcohol Use Status  No          Drug Use     Drug Use Status  No          Sexual Activity     Sexually Active  Not Asked               Alcohol Screening:       A score of 8 or more is associated with harmful or hazardous drinking. A score of 13 or more in women, and 15 or more in men, is likely to indicate alcohol dependence. Substance Abuse Interventions:  · none    General Health and ACP:  General  In general, how would you say your health is?: Good  In the past 7 days, have you experienced any of the following?  New or Increased Pain, New or Increased Fatigue, Loneliness, Social Isolation, Stress or Anger?: (!) New or Increased Pain, New or Increased Fatigue, Stress, Social Isolation  Do you get the social and emotional support that you need?: Yes  Do you have a Living Will?: (!) No  Advance Directives     Power of  Living Will ACP-Advance Directive ACP-Power of     Not on File Not on File Not on File Not on File      General Health Risk Interventions:  · none    Health Habits/Nutrition:  Health Habits/Nutrition  Do you exercise for at least 20 minutes 2-3 times per week?: (!) No  Have you lost any weight without trying in the past 3 months?: No  Do you eat only one meal per day?: No  Have you seen the dentist within the past year?: (!) No  Body mass index: (!) 25.82  Health Habits/Nutrition Interventions:  · none    Hearing/Vision:  No exam data present  Hearing/Vision  Do you or your family notice any trouble with your hearing that hasn't been managed with hearing aids?: No  Do you have difficulty driving, watching TV, or doing any of your daily activities because of your eyesight?: No  Have you had an eye exam within the past year?: (!) No  Hearing/Vision Interventions:  · none     ADL:  ADLs  In the past 7 days, did you need help from others to perform any of the tablet by mouth nightly     Dispense:  30 tablet     Refill:  5     Orders Placed This Encounter   Procedures    LIPID PANEL    COMPREHENSIVE METABOLIC PANEL    CBC WITH AUTO DIFFERENTIAL    Psa screening     Patient Instructions   The medication list included in this document is our record of what you are currently taking, including any changes that were made at today's visit.  If you find any differences when compared to your medications at home, or have any questions that were not answered at your visit, please contact the office. · Keep a list of your medicines with you. List all of the prescription medicines, nonprescription medicines, supplements, natural remedies, and vitamins that you take. Tell your healthcare providers who treat you about all of the products you are taking. Your provider can provide you with a form to keep track of them. Just ask. · Follow the directions that come with your medicine, including information about food or alcohol. Make sure you know how and when to take your medicine. Do not take more or less than you are supposed to take. · Keep all medicines out of the reach of children. · Store medicines according to the directions on the label. · Monitor yourself. Learn to know how your body reacts to your new medicine and keep track of how it makes you feel before attempting (If your provider has allowed you to do so) to drive or go to work. · Seek emergency medical attention if you think you have used too much of this medicine. An overdose of any prescription medicine can be fatal. Overdose symptoms may include extreme drowsiness, muscle weakness, confusion, cold and clammy skin, pinpoint pupils, shallow breathing, slow heart rate, fainting, or coma. · Don't share prescription medicines with others, even when they seem to have the same symptoms. What may be good for you may be harmful to others.   · If you are no longer taking a prescribed medication and you have pills left please take your pills out of their original containers. Mix crushed pills with an undesirable substance, such as cat litter or used coffee grounds. Put the mixture into a disposable container with a lid, such as an empty margarine tub, or into a sealable bag. Cover up or remove any of your personal information on the empty containers by covering it with black permanent marker or duct tape. Place the sealed container with the mixture, and the empty drug containers, in the trash. · If you use a medication that is in the form of a patch, dispose of used patches by folding them in half so that the sticky sides meet, and then flushing them down a toilet. They should not be placed in the household trash where children or pets can find them. · If you have any questions, ask your provider or pharmacist for more information. · Be sure to keep all appointments for provider visits or tests. We are committed to providing you with the best care possible. In order to help us achieve these goals please remember to bring all medications, herbal products, and over the counter supplements with you to each visit. If your provider has ordered testing for you, please be sure to follow up with our office if you have not received results within 7 days after the testing took place. *If you receive a survey after visiting one of our offices, please take time to share your experience concerning your physician office visit. These surveys are confidential and no health information about you is shared. We are eager to improve for you and we are counting on your feedback to help make that happen. Transportation and [address]  [address].    [address] [phone]  Help with food, clothing, transportation, utilities, prescription assistance.      [facility] [phone]  [facility] [employer]   [phone]  Teofilo Scot, [address] [facility] [address]    [address].  They handle meals on wheels and senior centers.    Arabella Mcfadden [phone] [facility] senior citizens center     [employer]   [phone] or [address] [facility]             [phone]    [facility]             [phone]    Helping [address] (Raynald Cushing)            [phone]    [facility] [phone]    [facility] senior citizens center             [address] [facility] [address]/ [address] [place] [address]              [phone]   [employer] [facility]     [phone]   [employer] and [address] (may visit once monthly      8-4:30)              [phone]    [facility] of [address], food and utility assistance (T,[place])    Energy Assistance  [employer]     [phone]     [address]   [phone]    [address]       [address] with applying for insurance coverage with Medicaid and Medicare including part D  Help with medication cost, eyeglasses or exams, hearing aides  [place]    [facility] [address] [phone] Gail V[address] [place]     [phone] Opal MEDINA   (Coordinating and Assisting the Reuse of Assistive Technology)  Help with durable medical equipment and assistive technology   [employer] [phone]  [employer]     [phone]    Domestic Violence Shelters   [place] Domestic Violence Hotline [phone]  [address] in [employer] but services Arabella Mcfadden and [place] [phone]  Dank President in [address] but services [place] [phone]    [facility]   Emergency Shelter and [employer] [phone]  [employer] [phone] of [address]  MANA/Rory Morales [facility] smoking behavior. However, do not smoke more cigarettes, inhale them more often or more deeply, or place your fingertips over the holes in the filters. All of these actions will increase your nicotine intake, and the idea is to get your body used to functioning without nicotine. Cut Down the Number of Cigarettes You Smoke   Smoke only half of each cigarette. Each day, postpone the lighting of your first cigarette by one hour. Decide you'll only smoke during odd or even hours of the day. Decide beforehand how many cigarettes you'll smoke during the day. For each additional cigarette, give a dollar to your favorite genoveva. Change your eating habits to help you cut down. For example, drink milk, which many people consider incompatible with smoking. End meals or snacks with something that won't lead to a cigarette. Reach for a glass of juice instead of a cigarette for a \"pick-me-up. \"   Remember: Cutting down can help you quit, but it's not a substitute for quitting. If you're down to about seven cigarettes a day, it's time to set your target quit date, and get ready to stick to it. Don't Smoke \"Automatically\"   Smoke only those cigarettes you really want. Catch yourself before you light up a cigarette out of pure habit. Don't empty your ashtrays. This will remind you of how many cigarettes you've smoked each day, and the sight and the smell of stale cigarettes butts will be very unpleasant. Make yourself aware of each cigarette by using the opposite hand or putting cigarettes in an unfamiliar location or a different pocket to break the automatic reach. If you light up many times during the day without even thinking about it, try to look in a mirror each time you put a match to your cigarette. You may decide you don't need it. Make Smoking Inconvenient   Stop buying cigarettes by the carton. Wait until one pack is empty before you buy another. Stop carrying cigarettes with you at home or at work. Make them difficult to get to. Make Smoking Unpleasant   Smoke only under circumstances that aren't especially pleasurable for you. If you like to smoke with others, smoke alone. Turn your chair to an empty corner and focus only on the cigarette you are smoking and all its many negative effects. Collect all your cigarette butts in one large glass container as a visual reminder of the filth made by smoking. Just Before Quitting   Practice going without cigarettes. Don't think of never smoking again. Think of quitting in terms of one day at a time . Tell yourself you won't smoke today, and then don't. Clean your clothes to rid them of the cigarette smell, which can linger a long time. On the Day You Quit   Throw away all your cigarettes and matches. Hide your lighters and ashtrays. Visit the dentist and have your teeth cleaned to get rid of tobacco stains. Notice how nice they look and resolve to keep them that way. Make a list of things you'd like to buy for yourself or someone else. Estimate the cost in terms of packs of cigarettes, and put the money aside to buy these presents. Keep very busy on the big day. Go to the movies, exercise, take long walks, or go bike riding. Remind your family and friends that this is your quit date, and ask them to help you over the rough spots of the first couple of days and weeks. Buy yourself a treat or do something special to celebrate. Telephone and Internet Support   Telephone, web-, and computer-based programs can offer you the support that you need to quit and to stay smoke-free. You can find many programs online, like the American Lung Association's Tow from Smoking . Immediately After Quitting   Develop a clean, fresh, nonsmoking environment around yourselfat work and at home. Buy yourself flowersyou may be surprised how much you can enjoy their scent now.    The first few days after you quit, spend as much free time as possible in places where smoking isn't allowed, such as [employer], [employer], theaters, department stores, and churches. Drink large quantities of water and fruit juice (but avoid sodas that contain caffeine). Try to avoid alcohol, coffee, and other beverages that you associate with cigarette smoking. Strike up conversation instead of a match for a cigarette. If you miss the sensation of having a cigarette in your hand, play with something elsea pencil, a paper clip, a marble. If you miss having something in your mouth, try toothpicks or a fake cigarette. Personalized Preventive Plan for Guicho Garza - 6/23/2021  Medicare offers a range of preventive health benefits. Some of the tests and screenings are paid in full while other may be subject to a deductible, co-insurance, and/or copay. Some of these benefits include a comprehensive review of your medical history including lifestyle, illnesses that may run in your family, and various assessments and screenings as appropriate. After reviewing your medical record and screening and assessments performed today your provider may have ordered immunizations, labs, imaging, and/or referrals for you. A list of these orders (if applicable) as well as your Preventive Care list are included within your After Visit Summary for your review. Other Preventive Recommendations:    A preventive eye exam performed by an eye specialist is recommended every 1-2 years to screen for glaucoma; cataracts, macular degeneration, and other eye disorders. A preventive dental visit is recommended every 6 months. Try to get at least 150 minutes of exercise per week or 10,000 steps per day on a pedometer . Order or download the FREE \"Exercise & Physical Activity: Your Everyday Guide\" from [employer]. Call 4-758.809.2104 or search [employer] online. You need 4849-9727 mg of calcium and 4448-9526 IU of vitamin D per day.  It is possible to meet your calcium requirement with diet alone, but a vitamin D supplement is usually necessary to meet this goal.  When exposed to the sun, use a sunscreen that protects against both UVA and UVB radiation with an SPF of 30 or greater. Reapply every 2 to 3 hours or after sweating, drying off with a towel, or swimming. Always wear a seat belt when traveling in a car. Always wear a helmet when riding a bicycle or motorcycle. Return in 3 months (on 9/23/2021) for Medicare Annual Wellness Visit in 1 year.

## 2021-06-23 ENCOUNTER — HOSPITAL ENCOUNTER (OUTPATIENT)
Facility: HOSPITAL | Age: 71
Discharge: HOME | End: 2021-06-23
Payer: MEDICARE

## 2021-06-23 ENCOUNTER — OFFICE VISIT (OUTPATIENT)
Dept: PRIMARY CARE CLINIC | Age: 71
End: 2021-06-23
Payer: MEDICARE

## 2021-06-23 VITALS
SYSTOLIC BLOOD PRESSURE: 118 MMHG | HEIGHT: 70 IN | TEMPERATURE: 97.1 F | DIASTOLIC BLOOD PRESSURE: 80 MMHG | OXYGEN SATURATION: 95 % | HEART RATE: 84 BPM | WEIGHT: 177.4 LBS | BODY MASS INDEX: 25.4 KG/M2

## 2021-06-23 DIAGNOSIS — Z00.00 ROUTINE GENERAL MEDICAL EXAMINATION AT A HEALTH CARE FACILITY: ICD-10-CM

## 2021-06-23 DIAGNOSIS — Z00.00 MEDICARE ANNUAL WELLNESS VISIT, SUBSEQUENT: ICD-10-CM

## 2021-06-23 DIAGNOSIS — Z12.5 ENCOUNTER FOR SCREENING FOR MALIGNANT NEOPLASM OF PROSTATE: Primary | ICD-10-CM

## 2021-06-23 DIAGNOSIS — Z12.11 SCREENING FOR COLON CANCER: ICD-10-CM

## 2021-06-23 DIAGNOSIS — Z12.5 ENCOUNTER FOR SCREENING FOR MALIGNANT NEOPLASM OF PROSTATE: ICD-10-CM

## 2021-06-23 DIAGNOSIS — Z13.1 ENCOUNTER FOR SCREENING FOR DIABETES MELLITUS: ICD-10-CM

## 2021-06-23 DIAGNOSIS — I10 ESSENTIAL HYPERTENSION: ICD-10-CM

## 2021-06-23 PROCEDURE — G0438 PPPS, INITIAL VISIT: HCPCS | Performed by: NURSE PRACTITIONER

## 2021-06-23 PROCEDURE — 84153 ASSAY OF PSA TOTAL: CPT

## 2021-06-23 PROCEDURE — G0103 PSA SCREENING: HCPCS

## 2021-06-23 PROCEDURE — 80053 COMPREHEN METABOLIC PANEL: CPT

## 2021-06-23 PROCEDURE — 85025 COMPLETE CBC W/AUTO DIFF WBC: CPT

## 2021-06-23 PROCEDURE — 80061 LIPID PANEL: CPT

## 2021-06-23 RX ORDER — QUETIAPINE FUMARATE 300 MG/1
300 TABLET, FILM COATED ORAL NIGHTLY
Qty: 30 TABLET | Refills: 5 | Status: SHIPPED | OUTPATIENT
Start: 2021-06-23

## 2021-06-23 ASSESSMENT — LIFESTYLE VARIABLES: HOW OFTEN DO YOU HAVE A DRINK CONTAINING ALCOHOL: 0

## 2021-06-23 ASSESSMENT — PATIENT HEALTH QUESTIONNAIRE - PHQ9
2. FEELING DOWN, DEPRESSED OR HOPELESS: 3
1. LITTLE INTEREST OR PLEASURE IN DOING THINGS: 2
SUM OF ALL RESPONSES TO PHQ QUESTIONS 1-9: 5
SUM OF ALL RESPONSES TO PHQ QUESTIONS 1-9: 5
SUM OF ALL RESPONSES TO PHQ9 QUESTIONS 1 & 2: 5
SUM OF ALL RESPONSES TO PHQ QUESTIONS 1-9: 5

## 2021-06-23 NOTE — PATIENT INSTRUCTIONS
The medication list included in this document is our record of what you are currently taking, including any changes that were made at today's visit.  If you find any differences when compared to your medications at home, or have any questions that were not answered at your visit, please contact the office. · Keep a list of your medicines with you. List all of the prescription medicines, nonprescription medicines, supplements, natural remedies, and vitamins that you take. Tell your healthcare providers who treat you about all of the products you are taking. Your provider can provide you with a form to keep track of them. Just ask. · Follow the directions that come with your medicine, including information about food or alcohol. Make sure you know how and when to take your medicine. Do not take more or less than you are supposed to take. · Keep all medicines out of the reach of children. · Store medicines according to the directions on the label. · Monitor yourself. Learn to know how your body reacts to your new medicine and keep track of how it makes you feel before attempting (If your provider has allowed you to do so) to drive or go to work. · Seek emergency medical attention if you think you have used too much of this medicine. An overdose of any prescription medicine can be fatal. Overdose symptoms may include extreme drowsiness, muscle weakness, confusion, cold and clammy skin, pinpoint pupils, shallow breathing, slow heart rate, fainting, or coma. · Don't share prescription medicines with others, even when they seem to have the same symptoms. What may be good for you may be harmful to others. · If you are no longer taking a prescribed medication and you have pills left please take your pills out of their original containers. Mix crushed pills with an undesirable substance, such as cat litter or used coffee grounds.  Put the mixture into a disposable container with a lid, such as an empty margarine tub, have thought about quitting but haven't been able to, here are some reasons why you should and some ways to do it. Here's Why   Quitting smoking now can decrease your risk of getting smoking-related illnesses like:   Heart disease   Stroke   Several types of cancer, including:   Lung   Mouth   Esophagus   Larynx   Bladder   Pancreas   Kidney   Chronic lung diseases:   Bronchitis   Emphysema   Asthma   Cataracts   Macular degeneration   Thyroid conditions   Hearing loss   Erectile dysfunction   Dementia   Osteoporosis   Here's How   Once you've decided to quit smoking, set your target quit date a few weeks away. In the time leading up to your quit day, try some of these ideas offered by the [address] to help you successfully quit smoking. For the best results, work with your doctor. Together, you can test your lung function and compare the results to those of a nonsmoking person. The results can be given to you as your lung age. Finding out your lung age right after having the test done may help you to stop smoking. Your doctor can also discuss with you all of your options and refer you to smoking-cessation support groups. You may wish to use nicotine replacement (gum, patches, inhaler) or one of the prescription medications that have been shown to increase quit rates and prolong abstinence from smoking. But whatever you and your doctor decide on these matters, it will still be you who decides when an how to quit. Here are some techniques:   Switch Brands   Switch to a brand you find distasteful. Change to a brand that is low in tar and nicotine a couple of weeks before your target quit date. This will help change your smoking behavior. However, do not smoke more cigarettes, inhale them more often or more deeply, or place your fingertips over the holes in the filters.  All of these actions will increase your nicotine intake, and the idea is to get your body used to functioning without nicotine. Cut Down the Number of Cigarettes You Smoke   Smoke only half of each cigarette. Each day, postpone the lighting of your first cigarette by one hour. Decide you'll only smoke during odd or even hours of the day. Decide beforehand how many cigarettes you'll smoke during the day. For each additional cigarette, give a dollar to your favorite genoveva. Change your eating habits to help you cut down. For example, drink milk, which many people consider incompatible with smoking. End meals or snacks with something that won't lead to a cigarette. Reach for a glass of juice instead of a cigarette for a \"pick-me-up. \"   Remember: Cutting down can help you quit, but it's not a substitute for quitting. If you're down to about seven cigarettes a day, it's time to set your target quit date, and get ready to stick to it. Don't Smoke \"Automatically\"   Smoke only those cigarettes you really want. Catch yourself before you light up a cigarette out of pure habit. Don't empty your ashtrays. This will remind you of how many cigarettes you've smoked each day, and the sight and the smell of stale cigarettes butts will be very unpleasant. Make yourself aware of each cigarette by using the opposite hand or putting cigarettes in an unfamiliar location or a different pocket to break the automatic reach. If you light up many times during the day without even thinking about it, try to look in a mirror each time you put a match to your cigarette. You may decide you don't need it. Make Smoking Inconvenient   Stop buying cigarettes by the carton. Wait until one pack is empty before you buy another. Stop carrying cigarettes with you at home or at work. Make them difficult to get to. Make Smoking Unpleasant   Smoke only under circumstances that aren't especially pleasurable for you. If you like to smoke with others, smoke alone.  Turn your chair to an empty corner and focus only on the cigarette you are smoking and all its many negative effects. Collect all your cigarette butts in one large glass container as a visual reminder of the filth made by smoking. Just Before Quitting   Practice going without cigarettes. Don't think of never smoking again. Think of quitting in terms of one day at a time . Tell yourself you won't smoke today, and then don't. Clean your clothes to rid them of the cigarette smell, which can linger a long time. On the Day You Quit   Throw away all your cigarettes and matches. Hide your lighters and ashtrays. Visit the dentist and have your teeth cleaned to get rid of tobacco stains. Notice how nice they look and resolve to keep them that way. Make a list of things you'd like to buy for yourself or someone else. Estimate the cost in terms of packs of cigarettes, and put the money aside to buy these presents. Keep very busy on the big day. Go to the movies, exercise, take long walks, or go bike riding. Remind your family and friends that this is your quit date, and ask them to help you over the rough spots of the first couple of days and weeks. Buy yourself a treat or do something special to celebrate. Telephone and Internet Support   Telephone, web-, and computer-based programs can offer you the support that you need to quit and to stay smoke-free. You can find many programs online, like the American [employer] Association's [place] from Smoking . Immediately After Quitting   Develop a clean, fresh, nonsmoking environment around yourselfat work and at home. Buy yourself flowersyou may be surprised how much you can enjoy their scent now. The first few days after you quit, spend as much free time as possible in places where smoking isn't allowed, such as [address] [place] [address], museums, theaters, department stores, and churches. Drink large quantities of water and fruit juice (but avoid sodas that contain caffeine).    Try to avoid alcohol, coffee, and other Reapply every 2 to 3 hours or after sweating, drying off with a towel, or swimming. Always wear a seat belt when traveling in a car. Always wear a helmet when riding a bicycle or motorcycle.

## 2021-06-24 LAB
A/G RATIO: 1.3 (ref 0.8–2)
ALBUMIN SERPL-MCNC: 4 G/DL (ref 3.4–4.8)
ALP BLD-CCNC: 43 U/L (ref 25–100)
ALT SERPL-CCNC: 10 U/L (ref 4–36)
ANION GAP SERPL CALCULATED.3IONS-SCNC: 12 MMOL/L (ref 3–16)
AST SERPL-CCNC: 16 U/L (ref 8–33)
BASOPHILS ABSOLUTE: 0.1 K/UL (ref 0–0.1)
BASOPHILS RELATIVE PERCENT: 0.7 %
BILIRUB SERPL-MCNC: <0.2 MG/DL (ref 0.3–1.2)
BUN BLDV-MCNC: 9 MG/DL (ref 6–20)
CALCIUM SERPL-MCNC: 9.4 MG/DL (ref 8.5–10.5)
CHLORIDE BLD-SCNC: 99 MMOL/L (ref 98–107)
CHOLESTEROL, TOTAL: 182 MG/DL (ref 0–200)
CO2: 25 MMOL/L (ref 20–30)
CREAT SERPL-MCNC: 1 MG/DL (ref 0.4–1.2)
EOSINOPHILS ABSOLUTE: 0.2 K/UL (ref 0–0.4)
EOSINOPHILS RELATIVE PERCENT: 2 %
GFR AFRICAN AMERICAN: >59
GFR NON-AFRICAN AMERICAN: >59
GLOBULIN: 3.1 G/DL
GLUCOSE BLD-MCNC: 88 MG/DL (ref 74–106)
HCT VFR BLD CALC: 42.8 % (ref 40–54)
HDLC SERPL-MCNC: 58 MG/DL (ref 40–60)
HEMOGLOBIN: 13.5 G/DL (ref 13–18)
IMMATURE GRANULOCYTES #: 0 K/UL
IMMATURE GRANULOCYTES %: 0.5 % (ref 0–5)
LDL CHOLESTEROL CALCULATED: 106 MG/DL
LYMPHOCYTES ABSOLUTE: 1.6 K/UL (ref 1.5–4)
LYMPHOCYTES RELATIVE PERCENT: 19.5 %
MCH RBC QN AUTO: 30.8 PG (ref 27–32)
MCHC RBC AUTO-ENTMCNC: 31.5 G/DL (ref 31–35)
MCV RBC AUTO: 97.7 FL (ref 80–100)
MONOCYTES ABSOLUTE: 0.7 K/UL (ref 0.2–0.8)
MONOCYTES RELATIVE PERCENT: 8.3 %
NEUTROPHILS ABSOLUTE: 5.8 K/UL (ref 2–7.5)
NEUTROPHILS RELATIVE PERCENT: 69 %
PDW BLD-RTO: 14.1 % (ref 11–16)
PLATELET # BLD: 447 K/UL (ref 150–400)
PMV BLD AUTO: 10.3 FL (ref 6–10)
POTASSIUM SERPL-SCNC: 5.3 MMOL/L (ref 3.4–5.1)
PROSTATE SPECIFIC ANTIGEN: 1.18 NG/ML (ref 0–4)
RBC # BLD: 4.38 M/UL (ref 4.5–6)
SODIUM BLD-SCNC: 136 MMOL/L (ref 136–145)
TOTAL PROTEIN: 7.1 G/DL (ref 6.4–8.3)
TRIGL SERPL-MCNC: 91 MG/DL (ref 0–249)
VLDLC SERPL CALC-MCNC: 18 MG/DL
WBC # BLD: 8.4 K/UL (ref 4–11)

## 2021-07-01 ASSESSMENT — ENCOUNTER SYMPTOMS
SHORTNESS OF BREATH: 0
NAUSEA: 0
SORE THROAT: 0
COUGH: 0
ABDOMINAL PAIN: 0
VOMITING: 0
EYE PAIN: 0
BACK PAIN: 1

## 2021-08-03 DIAGNOSIS — M54.2 CHRONIC NECK PAIN: ICD-10-CM

## 2021-08-03 DIAGNOSIS — M54.9 CHRONIC BACK PAIN, UNSPECIFIED BACK LOCATION, UNSPECIFIED BACK PAIN LATERALITY: ICD-10-CM

## 2021-08-03 DIAGNOSIS — G89.29 CHRONIC BACK PAIN, UNSPECIFIED BACK LOCATION, UNSPECIFIED BACK PAIN LATERALITY: ICD-10-CM

## 2021-08-03 DIAGNOSIS — G89.29 CHRONIC NECK PAIN: ICD-10-CM

## 2021-08-03 RX ORDER — METHOCARBAMOL 750 MG/1
750 TABLET, FILM COATED ORAL 3 TIMES DAILY PRN
Qty: 90 TABLET | Refills: 5 | OUTPATIENT
Start: 2021-08-03

## 2021-08-03 RX ORDER — GABAPENTIN 400 MG/1
400 CAPSULE ORAL 4 TIMES DAILY
Qty: 120 CAPSULE | Refills: 2 | OUTPATIENT
Start: 2021-08-03

## 2021-08-04 ENCOUNTER — HOSPITAL ENCOUNTER (EMERGENCY)
Facility: HOSPITAL | Age: 71
Discharge: HOME | End: 2021-08-05
Attending: EMERGENCY MEDICINE
Payer: MEDICARE

## 2021-08-04 ENCOUNTER — APPOINTMENT (OUTPATIENT)
Dept: CT IMAGING | Facility: HOSPITAL | Age: 71
End: 2021-08-04
Payer: MEDICARE

## 2021-08-04 VITALS
SYSTOLIC BLOOD PRESSURE: 134 MMHG | DIASTOLIC BLOOD PRESSURE: 93 MMHG | TEMPERATURE: 98.2 F | BODY MASS INDEX: 25.05 KG/M2 | HEART RATE: 106 BPM | OXYGEN SATURATION: 96 % | RESPIRATION RATE: 16 BRPM | WEIGHT: 175 LBS | HEIGHT: 70 IN

## 2021-08-04 DIAGNOSIS — F10.929 ACUTE ALCOHOLIC INTOXICATION WITH COMPLICATION (HCC): ICD-10-CM

## 2021-08-04 DIAGNOSIS — R41.82 ALTERED MENTAL STATUS, UNSPECIFIED ALTERED MENTAL STATUS TYPE: Primary | ICD-10-CM

## 2021-08-04 DIAGNOSIS — E16.2 HYPOGLYCEMIA: ICD-10-CM

## 2021-08-04 LAB
BASOPHILS ABSOLUTE: 0.1 K/UL (ref 0–0.1)
BASOPHILS RELATIVE PERCENT: 0.6 %
EOSINOPHILS ABSOLUTE: 0 K/UL (ref 0–0.4)
EOSINOPHILS RELATIVE PERCENT: 0.1 %
HCT VFR BLD CALC: 46.7 % (ref 40–54)
HEMOGLOBIN: 14.4 G/DL (ref 13–18)
IMMATURE GRANULOCYTES #: 0.1 K/UL
IMMATURE GRANULOCYTES %: 0.8 % (ref 0–5)
LYMPHOCYTES ABSOLUTE: 0.9 K/UL (ref 1.5–4)
LYMPHOCYTES RELATIVE PERCENT: 8.7 %
MCH RBC QN AUTO: 31.4 PG (ref 27–32)
MCHC RBC AUTO-ENTMCNC: 30.8 G/DL (ref 31–35)
MCV RBC AUTO: 102 FL (ref 80–100)
MONOCYTES ABSOLUTE: 0.4 K/UL (ref 0.2–0.8)
MONOCYTES RELATIVE PERCENT: 3.4 %
NEUTROPHILS ABSOLUTE: 8.8 K/UL (ref 2–7.5)
NEUTROPHILS RELATIVE PERCENT: 86.4 %
PDW BLD-RTO: 16.6 % (ref 11–16)
PLATELET # BLD: 357 K/UL (ref 150–400)
PMV BLD AUTO: 10.3 FL (ref 6–10)
RBC # BLD: 4.58 M/UL (ref 4.5–6)
WBC # BLD: 10.2 K/UL (ref 4–11)

## 2021-08-04 PROCEDURE — 85025 COMPLETE CBC W/AUTO DIFF WBC: CPT

## 2021-08-04 PROCEDURE — 82077 ASSAY SPEC XCP UR&BREATH IA: CPT

## 2021-08-04 PROCEDURE — 36415 COLL VENOUS BLD VENIPUNCTURE: CPT

## 2021-08-04 PROCEDURE — 96365 THER/PROPH/DIAG IV INF INIT: CPT

## 2021-08-04 PROCEDURE — 6360000002 HC RX W HCPCS: Performed by: EMERGENCY MEDICINE

## 2021-08-04 PROCEDURE — 81001 URINALYSIS AUTO W/SCOPE: CPT

## 2021-08-04 PROCEDURE — 82140 ASSAY OF AMMONIA: CPT

## 2021-08-04 PROCEDURE — 80307 DRUG TEST PRSMV CHEM ANLYZR: CPT

## 2021-08-04 PROCEDURE — 99283 EMERGENCY DEPT VISIT LOW MDM: CPT

## 2021-08-04 PROCEDURE — 2500000003 HC RX 250 WO HCPCS: Performed by: EMERGENCY MEDICINE

## 2021-08-04 PROCEDURE — 70450 CT HEAD/BRAIN W/O DYE: CPT

## 2021-08-04 PROCEDURE — 96366 THER/PROPH/DIAG IV INF ADDON: CPT

## 2021-08-04 PROCEDURE — 80053 COMPREHEN METABOLIC PANEL: CPT

## 2021-08-04 PROCEDURE — 83690 ASSAY OF LIPASE: CPT

## 2021-08-04 PROCEDURE — 2580000003 HC RX 258: Performed by: EMERGENCY MEDICINE

## 2021-08-04 RX ORDER — 0.9 % SODIUM CHLORIDE 0.9 %
1000 INTRAVENOUS SOLUTION INTRAVENOUS ONCE
Status: COMPLETED | OUTPATIENT
Start: 2021-08-04 | End: 2021-08-05

## 2021-08-04 RX ORDER — THIAMINE HYDROCHLORIDE 100 MG/ML
INJECTION, SOLUTION INTRAMUSCULAR; INTRAVENOUS
Status: DISCONTINUED
Start: 2021-08-04 | End: 2021-08-05 | Stop reason: HOSPADM

## 2021-08-04 RX ORDER — FOLIC ACID 5 MG/ML
INJECTION, SOLUTION INTRAMUSCULAR; INTRAVENOUS; SUBCUTANEOUS
Status: DISCONTINUED
Start: 2021-08-04 | End: 2021-08-05 | Stop reason: HOSPADM

## 2021-08-04 RX ORDER — ASCORBIC ACID, VITAMIN A PALMITATE, CHOLECALCIFEROL, THIAMINE HYDROCHLORIDE, RIBOFLAVIN-5 PHOSPHATE SODIUM, PYRIDOXINE HYDROCHLORIDE, NIACINAMIDE, DEXPANTHENOL, ALPHA-TOCOPHEROL ACETATE, VITAMIN K1, FOLIC ACID, BIOTIN, CYANOCOBALAMIN 200; 3300; 200; 6; 3.6; 6; 40; 15; 10; 150; 600; 60; 5 MG/10ML; [IU]/10ML; [IU]/10ML; MG/10ML; MG/10ML; MG/10ML; MG/10ML; MG/10ML; [IU]/10ML; UG/10ML; UG/10ML; UG/10ML; UG/10ML
INJECTION, SOLUTION INTRAVENOUS
Status: DISCONTINUED
Start: 2021-08-04 | End: 2021-08-05 | Stop reason: HOSPADM

## 2021-08-04 RX ADMIN — SODIUM CHLORIDE 1000 ML: 9 INJECTION, SOLUTION INTRAVENOUS at 23:52

## 2021-08-04 RX ADMIN — FOLIC ACID: 5 INJECTION, SOLUTION INTRAMUSCULAR; INTRAVENOUS; SUBCUTANEOUS at 23:52

## 2021-08-05 LAB
A/G RATIO: 1.2 (ref 0.8–2)
ALBUMIN SERPL-MCNC: 4.4 G/DL (ref 3.4–4.8)
ALP BLD-CCNC: 47 U/L (ref 25–100)
ALT SERPL-CCNC: 20 U/L (ref 4–36)
AMMONIA: 60 MCG/DL (ref 27–102)
AMPHETAMINE SCREEN, URINE: ABNORMAL
ANION GAP SERPL CALCULATED.3IONS-SCNC: 23 MMOL/L (ref 3–16)
AST SERPL-CCNC: 43 U/L (ref 8–33)
BARBITURATE SCREEN URINE: ABNORMAL
BENZODIAZEPINE SCREEN, URINE: ABNORMAL
BILIRUB SERPL-MCNC: 0.4 MG/DL (ref 0.3–1.2)
BILIRUBIN URINE: NEGATIVE
BLOOD, URINE: NEGATIVE
BUN BLDV-MCNC: 14 MG/DL (ref 6–20)
BUPRENORPHINE QUAL, URINE: ABNORMAL
CALCIUM SERPL-MCNC: 9.3 MG/DL (ref 8.5–10.5)
CANNABINOID SCREEN URINE: ABNORMAL
CHLORIDE BLD-SCNC: 100 MMOL/L (ref 98–107)
CLARITY: CLEAR
CO2: 19 MMOL/L (ref 20–30)
COARSE CASTS, UA: ABNORMAL /LPF (ref 0–2)
COCAINE METABOLITE SCREEN URINE: ABNORMAL
COLOR: YELLOW
CREAT SERPL-MCNC: 1.3 MG/DL (ref 0.4–1.2)
ETHANOL: 185 MG/DL (ref 0–0.08)
GFR AFRICAN AMERICAN: >59
GFR NON-AFRICAN AMERICAN: 55
GLOBULIN: 3.8 G/DL
GLUCOSE BLD-MCNC: 199 MG/DL (ref 74–106)
GLUCOSE URINE: 100 MG/DL
KETONES, URINE: 40 MG/DL
LEUKOCYTE ESTERASE, URINE: NEGATIVE
LIPASE: 23 U/L (ref 5.6–51.3)
Lab: ABNORMAL
METHADONE SCREEN, URINE: ABNORMAL
METHAMPHETAMINE, URINE: ABNORMAL
MICROSCOPIC EXAMINATION: YES
MUCUS: ABNORMAL /LPF
NITRITE, URINE: NEGATIVE
OPIATE SCREEN URINE: ABNORMAL
PH UA: 5 (ref 5–8)
PHENCYCLIDINE SCREEN URINE: ABNORMAL
POTASSIUM REFLEX MAGNESIUM: 4.6 MMOL/L (ref 3.4–5.1)
PROPOXYPHENE SCREEN, URINE: ABNORMAL
PROTEIN UA: ABNORMAL MG/DL
RBC UA: ABNORMAL /HPF (ref 0–4)
SODIUM BLD-SCNC: 142 MMOL/L (ref 136–145)
SPECIFIC GRAVITY UA: 1.02 (ref 1–1.03)
TOTAL PROTEIN: 8.2 G/DL (ref 6.4–8.3)
TRICYCLIC, URINE: POSITIVE
UR OXYCODONE RAPID SCREEN: ABNORMAL
URINE REFLEX TO CULTURE: ABNORMAL
URINE TYPE: ABNORMAL
UROBILINOGEN, URINE: 0.2 E.U./DL
WBC UA: ABNORMAL /HPF (ref 0–5)

## 2021-08-05 NOTE — ED PROVIDER NOTES
resolved at the current time  Psychiatric:  No anxiety  Genitourinary:  No dysuria, no hematuria    Except as noted above the remainder of the review of systems was reviewed and negative. PAST MEDICAL HISTORY     Past Medical History:   Diagnosis Date    Bipolar affective ([facility] [address] [facility]     Cholesterol blood decreased     Chronic back pain     COPD (chronic obstructive pulmonary disease) (HCC)     Depression     DJD (degenerative joint disease)     Environmental allergies     GERD (gastroesophageal reflux disease)     Hypertension     Insomnia     Seizures ([facility] [address]     patient was told a long time ago that he had a seizure         SURGICAL HISTORY       Past Surgical History:   Procedure Laterality Date    APPENDECTOMY      BACK SURGERY      CHOLECYSTECTOMY      HAND SURGERY Right 2014         CURRENT MEDICATIONS       Previous Medications    ALBUTEROL SULFATE  (90 BASE) MCG/ACT INHALER    Inhale 2 puffs into the lungs every 6 hours as needed for Wheezing    BUDESONIDE-FORMOTEROL (SYMBICORT) 160-4.5 MCG/ACT AERO    Inhale 2 puffs into the lungs 2 times daily as needed (episodic wheezing)    CETIRIZINE (ZYRTEC) 10 MG TABLET    Take 1 tablet by mouth daily as needed for Allergies    CHOLECALCIFEROL (VITAMIN D3) 50 MCG (2000 UT) CAPS    Take 1 capsule by mouth daily    CYANOCOBALAMIN (B-12) 2500 MCG SUBL    Place 1 tablet under the tongue daily    DULOXETINE (CYMBALTA) 60 MG EXTENDED RELEASE CAPSULE    Take 1 capsule by mouth 2 times daily    FLUTICASONE (FLONASE) 50 MCG/ACT NASAL SPRAY    1-2 sprays by Nasal route daily as needed for Rhinitis    GABAPENTIN (NEURONTIN) 400 MG CAPSULE    Take 1 capsule by mouth 4 times daily for 30 days.     METHOCARBAMOL (ROBAXIN) 750 MG TABLET    Take 1 tablet by mouth 3 times daily as needed (muscle spasms)    METOPROLOL SUCCINATE (TOPROL XL) 25 MG EXTENDED RELEASE TABLET    Take 1 tablet by mouth nightly    MIRTAZAPINE (REMERON) 30 MG TABLET    Take 30 mg by mouth nightly    MULTIPLE VITAMINS-MINERALS (MULTIVITAMIN WITH MINERALS) TABLET    Take 1 tablet by mouth daily    NAPROXEN (NAPROSYN) 500 MG TABLET    Take 1 tablet by mouth 2 times daily as needed for Pain    OMEPRAZOLE (PRILOSEC) 20 MG DELAYED RELEASE CAPSULE    Take 1 capsule by mouth Daily    QUETIAPINE (SEROQUEL) 200 MG TABLET    Take 1 tablet by mouth 2 times daily    QUETIAPINE (SEROQUEL) 300 MG TABLET    Take 1 tablet by mouth nightly       ALLERGIES     Patient has no known allergies. FAMILY HISTORY     History reviewed. No pertinent family history. SOCIAL HISTORY       Social History     Socioeconomic History    Marital status:      Spouse name: None    Number of children: None    Years of education: None    Highest education level: None   Occupational History    None   Tobacco Use    Smoking status: Current Every Day Smoker     Packs/day: 1.00     Years: 14.00     Pack years: 14.00     Types: Cigarettes    Smokeless tobacco: Never Used   Substance and Sexual Activity    Alcohol use: Yes     Comment: alcohol intoxication 08/03 and 08/04.  Drug use: No    Sexual activity: None   Other Topics Concern    None   Social History Narrative    None     Social Determinants of Health     Financial Resource Strain:     Difficulty of Paying Living Expenses:    Food Insecurity:     Worried About Running Out of Food in the Last Year:     [address] in the Last Year:    Transportation Needs:     Lack of Transportation (Medical):      Lack of Transportation (Non-Medical):    Physical Activity:     Days of Exercise per Week:     Minutes of Exercise per Session:    Stress:     Feeling of Stress :    Social Connections:     Frequency of Communication with Friends and Family:     Frequency of Social Gatherings with Friends and Family:     Attends Hoahaoism Services:     Active Member of Clubs or Organizations:     Attends Club or Organization Meetings:     Marital Status: Intimate Partner Violence:     Fear of Current or Ex-Partner:     Emotionally Abused:     Physically Abused:     Sexually Abused:          PHYSICAL EXAM    (up to 7 for level 4, 8 or more for level 5)     ED Triage Vitals [08/04/21 2306]   BP Temp Temp Source Pulse Resp SpO2 Height Weight   (!) 134/93 98.2 °F (36.8 °C) Oral 106 16 96 % 5' 9.5\" (1.765 m) 175 lb (79.4 kg)       Physical Exam  General :Patient is awake, alert, oriented, in no acute distress, nontoxic appearing  HEENT: Pupils are equally round and reactive to light, EOMI, conjunctivae clear. Oral mucosa is moist, no exudate. Uvula is midline  Neck: Neck is supple, full range of motion, trachea midline  Cardiac: Heart regular rate, rhythm, no murmurs, rubs, or gallops  Lungs: Lungs are clear to auscultation, there is no wheezing, rhonchi, or rales. There is no use of accessory muscles. Chest wall: There is no tenderness to palpation over the chest wall or over ribs  Abdomen: Abdomen is soft, nontender, nondistended. There is no firm or pulsatile masses, no rebound rigidity or guarding. Musculoskeletal: 5 out of 5 strength in all 4 extremities. No focal muscle deficits are appreciated  Neuro: Motor intact, sensory intact, level of consciousness is normal, cerebellar function is normal, reflexes are grossly normal. No evidence of incontinence or loss of bowel or bladder function, no saddle anesthesia noted   Dermatology: Skin is warm and dry  Psych: Mentation is grossly normal, cognition is grossly normal. Affect is appropriate.       DIAGNOSTIC RESULTS     EKG: All EKG's are interpreted by the Emergency Department Physician who either signs or Co-signs this chart in the [address] a cardiologist.    The EKG interpreted by me shows     RADIOLOGY:   Non-plain film images such as CT, Ultrasound and MRI are read by the radiologist. Plain radiographic images are visualized and preliminarily interpreted by the emergency physician with the below Phosphatase 47 25 - 100 U/L    ALT 20 4 - 36 U/L    AST 43 (H) 8 - 33 U/L    Globulin 3.8 g/dL   Lipase   Result Value Ref Range    Lipase 23.0 5.6 - 51.3 U/L   Urine Reflex to Culture    Specimen: Urine, clean catch   Result Value Ref Range    Color, UA Yellow Straw/Yellow    Clarity, UA Clear Clear    Glucose, Ur 100 (A) Negative mg/dL    Bilirubin Urine Negative Negative    Ketones, Urine 40 (A) Negative mg/dL    Specific Gravity, UA 1.025 1.005 - 1.030    Blood, Urine Negative Negative    pH, UA 5.0 5.0 - 8.0    Protein, UA TRACE (A) Negative mg/dL    Urobilinogen, Urine 0.2 <2.0 E.U./dL    Nitrite, Urine Negative Negative    Leukocyte Esterase, Urine Negative Negative    Microscopic Examination YES     Urine Type Voided     Urine Reflex to Culture Not Indicated    Drug Screen, Multiple, Urine   Result Value Ref Range    PCP Screen, Urine Neg Negative <25 ng/mL    Benzodiazepine Screen, Urine Neg Negative <300 ng/mL    Cocaine Metabolite Screen, Urine Neg Negative <300 ng/mL    Amphetamine Screen, Urine Neg Negative <1000 ng/mL    Cannabinoid Scrn, Ur Neg Negative <50 ng/mL    Opiate Scrn, Ur Neg Negative <300 ng/mL    Barbiturate Screen, Ur Neg Negative <733 ng/mL    Tricyclic POSITIVE (A) Negative <300 ng/mL    Methadone Screen, Urine Neg Negative <300 ng/ml    Propoxyphene Screen, Urine Neg Negative <300 ng/mL    Methamphetamine, Urine Neg Negative <1000 ng/mL    UR Oxycodone Rapid Screen Neg Negative <100 ng/mL    Buprenorphine Qual, Urine Neg Negative <25 ng/mL    Drug Screen Comment: see below    Ethanol   Result Value Ref Range    Ethanol Lvl 185 mg/dL   Ammonia   Result Value Ref Range    Ammonia 60 27 - 102 mcg/dL   Microscopic Urinalysis   Result Value Ref Range    Coarse Casts, UA 0-2 0 - 2 /LPF    Mucus, UA 1+ (A) None Seen /LPF    WBC, UA 0-2 0 - 5 /HPF    RBC, UA None seen 0 - 4 /HPF        All other labs were within normal range or not returned as of this dictation.     EMERGENCY DEPARTMENT COURSE and DIFFERENTIAL DIAGNOSIS/MDM:   Vitals:    Vitals:    08/04/21 2306   BP: (!) 134/93   Pulse: 106   Resp: 16   Temp: 98.2 °F (36.8 °C)   TempSrc: Oral   SpO2: 96%   Weight: 175 lb (79.4 kg)   Height: 5' 9.5\" (1.765 m)       MEDICATIONS ADMINISTERED IN ED:  Medications   thiamine (B-1) 100 MG/ML injection (  Not Given 8/4/21 2353)   adult multi-vitamin with vitamin k injection (  Not Given 3/7/97 5346)   folic acid 5 MG/ML injection (  Not Given 8/4/21 2353)   0.9 % sodium chloride bolus (0 mLs Intravenous Stopped 8/5/21 0052)   sodium chloride 0.9 % 7,551 mL with folic acid 1 mg, adult multi-vitamin with vitamin k 10 mL, thiamine 100 mg ( Intravenous New Bag 8/4/21 2352)       Patient was placed examination room in which time after exam was performed patient was given 1 L IV normal saline bolus along with a rally pack IV. Patient was taken over for CT scan of the head. Labs were obtained. Patient is alert and oriented x3 with a GCS of 15 and states that he has no current complaints that he just drank a little heavy earlier today. Review of patient's labs revealed normal CMP. Glucose was 199 alcohol level was 185. White count was 10.2 thousand and hemoglobin was 14.4 thousand. Patient was taking p.o. intake and states that he has no current complaint and is not sure why his family sent him to the emergency department. CT scan of the head was no acute process per radiology. Results were reviewed with patient and was discussed about abstaining from alcohol and to take frequent fingerstick glucoses and take his medications as prescribed. Patient was appreciative the care agrees with the plan above and family is on his way to pick patient up and take him home    The patient will follow-up with their PCP in 1-2 days for reevaluation. If the patient or family members have anyfurther concerns or any worsening symptoms they will return to the ED for reevaluation. CONSULTS:  None    PROCEDURES:  Procedures    CRITICAL CARE TIME    Total Critical Care time was 0 minutes, excluding separately reportable procedures. There was a high probability of clinically significant/life threatening deterioration in the patient's condition which required my urgent intervention. FINAL IMPRESSION      1. Altered mental status, unspecified altered mental status type Stable   2. Acute alcoholic intoxication with complication (HCC) Stable   3. Hypoglycemia Stable         DISPOSITION/PLAN   DISPOSITION        PATIENT REFERRED TO:  Rosie Tracey, APRN  [address]  [phone]    Schedule an appointment as soon as possible for a visit in 2 days      [facility] Emergency Department  [facility]  [address] [facility]  [phone]  In 2 days  If symptoms worsen      DISCHARGE MEDICATIONS:  New Prescriptions    No medications on file       Comment: Please note this report has been produced using speech recognition software and may contain errorsrelated to that system including errors in grammar, punctuation, and spelling, as well as words and phrases that may be inappropriate. If there are any questions or concerns please feel free to contact the dictating providerfor clarification.     Ynes Coleman MD  Attending Emergency Physician              Ynes Coleman MD  08/05/21 5157

## 2021-08-05 NOTE — ED NOTES
Patient arrives via [facility] EC-3 63year old male with altered mental status and \"sleeping in the recliner since 3pm, wife unable to wake him up\". EMS was also called yesterday 08/03 for alcohol intoxication, but patient signed out [place]. Blood sugar was 45 on scene, patient was given oral glucose, and IV d50. Upon arrival to ER patient is awake and alert GCS 15, unsure of why he is here, but able to answer name, birthday, location, year, month, and all questions appropriately.       Jarrod Calderon RN  08/04/21 0936

## 2021-09-03 DIAGNOSIS — G89.29 CHRONIC BACK PAIN, UNSPECIFIED BACK LOCATION, UNSPECIFIED BACK PAIN LATERALITY: ICD-10-CM

## 2021-09-03 DIAGNOSIS — G89.29 CHRONIC NECK PAIN: ICD-10-CM

## 2021-09-03 DIAGNOSIS — M54.2 CHRONIC NECK PAIN: ICD-10-CM

## 2021-09-03 DIAGNOSIS — M54.9 CHRONIC BACK PAIN, UNSPECIFIED BACK LOCATION, UNSPECIFIED BACK PAIN LATERALITY: ICD-10-CM

## 2021-09-03 RX ORDER — GABAPENTIN 400 MG/1
400 CAPSULE ORAL 4 TIMES DAILY
Qty: 120 CAPSULE | Refills: 2 | Status: SHIPPED | OUTPATIENT
Start: 2021-09-03 | End: 2022-07-30

## 2021-09-11 ENCOUNTER — HOSPITAL ENCOUNTER (EMERGENCY)
Facility: HOSPITAL | Age: 71
Discharge: HOME | End: 2021-09-12
Attending: FAMILY MEDICINE
Payer: MEDICARE

## 2021-09-11 DIAGNOSIS — K29.20 ACUTE ALCOHOLIC GASTRITIS WITHOUT HEMORRHAGE: ICD-10-CM

## 2021-09-11 DIAGNOSIS — R11.2 NON-INTRACTABLE VOMITING WITH NAUSEA, UNSPECIFIED VOMITING TYPE: Primary | ICD-10-CM

## 2021-09-11 DIAGNOSIS — R00.0 SINUS TACHYCARDIA: ICD-10-CM

## 2021-09-11 DIAGNOSIS — D72.829 LEUKOCYTOSIS, UNSPECIFIED TYPE: ICD-10-CM

## 2021-09-11 LAB
A/G RATIO: 1.2 (ref 0.8–2)
ALBUMIN SERPL-MCNC: 4.9 G/DL (ref 3.4–4.8)
ALP BLD-CCNC: 47 U/L (ref 25–100)
ALT SERPL-CCNC: 35 U/L (ref 4–36)
AMMONIA: 82 MCG/DL (ref 27–102)
AMYLASE: 56 U/L (ref 20–104)
ANION GAP SERPL CALCULATED.3IONS-SCNC: 37 MMOL/L (ref 3–16)
AST SERPL-CCNC: 57 U/L (ref 8–33)
BASOPHILS ABSOLUTE: 0.1 K/UL (ref 0–0.1)
BASOPHILS RELATIVE PERCENT: 0.3 %
BILIRUB SERPL-MCNC: 1.3 MG/DL (ref 0.3–1.2)
BUN BLDV-MCNC: 25 MG/DL (ref 6–20)
CALCIUM SERPL-MCNC: 9.8 MG/DL (ref 8.5–10.5)
CHLORIDE BLD-SCNC: 86 MMOL/L (ref 98–107)
CO2: 10 MMOL/L (ref 20–30)
CREAT SERPL-MCNC: 1.2 MG/DL (ref 0.4–1.2)
EOSINOPHILS ABSOLUTE: 0 K/UL (ref 0–0.4)
EOSINOPHILS RELATIVE PERCENT: 0 %
ETHANOL: 27 MG/DL (ref 0–0.08)
GFR AFRICAN AMERICAN: >59
GFR NON-AFRICAN AMERICAN: >59
GLOBULIN: 4.1 G/DL
GLUCOSE BLD-MCNC: 135 MG/DL (ref 74–106)
HCT VFR BLD CALC: 48.1 % (ref 40–54)
HEMOGLOBIN: 15.4 G/DL (ref 13–18)
IMMATURE GRANULOCYTES #: 0.2 K/UL
IMMATURE GRANULOCYTES %: 0.8 % (ref 0–5)
LACTIC ACID: 3.9 MMOL/L (ref 0.4–2)
LIPASE: 19 U/L (ref 5.6–51.3)
LYMPHOCYTES ABSOLUTE: 0.8 K/UL (ref 1.5–4)
LYMPHOCYTES RELATIVE PERCENT: 2.6 %
MCH RBC QN AUTO: 31.3 PG (ref 27–32)
MCHC RBC AUTO-ENTMCNC: 32 G/DL (ref 31–35)
MCV RBC AUTO: 97.8 FL (ref 80–100)
MONOCYTES ABSOLUTE: 3.2 K/UL (ref 0.2–0.8)
MONOCYTES RELATIVE PERCENT: 10.7 %
NEUTROPHILS ABSOLUTE: 25.7 K/UL (ref 2–7.5)
NEUTROPHILS RELATIVE PERCENT: 85.6 %
PDW BLD-RTO: 17.6 % (ref 11–16)
PLATELET # BLD: 416 K/UL (ref 150–400)
PMV BLD AUTO: 10 FL (ref 6–10)
POTASSIUM SERPL-SCNC: 4.2 MMOL/L (ref 3.4–5.1)
RBC # BLD: 4.92 M/UL (ref 4.5–6)
SARS-COV-2, NAAT: NOT DETECTED
SODIUM BLD-SCNC: 133 MMOL/L (ref 136–145)
TOTAL PROTEIN: 9 G/DL (ref 6.4–8.3)
WBC # BLD: 30 K/UL (ref 4–11)

## 2021-09-11 PROCEDURE — 83605 ASSAY OF LACTIC ACID: CPT

## 2021-09-11 PROCEDURE — 82140 ASSAY OF AMMONIA: CPT

## 2021-09-11 PROCEDURE — 2500000003 HC RX 250 WO HCPCS: Performed by: FAMILY MEDICINE

## 2021-09-11 PROCEDURE — 82150 ASSAY OF AMYLASE: CPT

## 2021-09-11 PROCEDURE — 2580000003 HC RX 258: Performed by: FAMILY MEDICINE

## 2021-09-11 PROCEDURE — 87635 SARS-COV-2 COVID-19 AMP PRB: CPT

## 2021-09-11 PROCEDURE — 96365 THER/PROPH/DIAG IV INF INIT: CPT

## 2021-09-11 PROCEDURE — 87040 BLOOD CULTURE FOR BACTERIA: CPT

## 2021-09-11 PROCEDURE — 96375 TX/PRO/DX INJ NEW DRUG ADDON: CPT

## 2021-09-11 PROCEDURE — 36415 COLL VENOUS BLD VENIPUNCTURE: CPT

## 2021-09-11 PROCEDURE — 85025 COMPLETE CBC W/AUTO DIFF WBC: CPT

## 2021-09-11 PROCEDURE — 6360000002 HC RX W HCPCS: Performed by: FAMILY MEDICINE

## 2021-09-11 PROCEDURE — 83690 ASSAY OF LIPASE: CPT

## 2021-09-11 PROCEDURE — 82077 ASSAY SPEC XCP UR&BREATH IA: CPT

## 2021-09-11 PROCEDURE — 99283 EMERGENCY DEPT VISIT LOW MDM: CPT

## 2021-09-11 PROCEDURE — 80053 COMPREHEN METABOLIC PANEL: CPT

## 2021-09-11 PROCEDURE — 96376 TX/PRO/DX INJ SAME DRUG ADON: CPT

## 2021-09-11 RX ORDER — METOPROLOL TARTRATE 5 MG/5ML
5 INJECTION INTRAVENOUS ONCE
Status: COMPLETED | OUTPATIENT
Start: 2021-09-11 | End: 2021-09-11

## 2021-09-11 RX ORDER — LORAZEPAM 2 MG/ML
1 INJECTION INTRAMUSCULAR ONCE
Status: COMPLETED | OUTPATIENT
Start: 2021-09-11 | End: 2021-09-11

## 2021-09-11 RX ORDER — SODIUM CHLORIDE 9 MG/ML
INJECTION, SOLUTION INTRAVENOUS ONCE
Status: COMPLETED | OUTPATIENT
Start: 2021-09-11 | End: 2021-09-11

## 2021-09-11 RX ADMIN — LORAZEPAM 1 MG: 2 INJECTION INTRAMUSCULAR; INTRAVENOUS at 22:55

## 2021-09-11 RX ADMIN — SODIUM CHLORIDE: 9 INJECTION, SOLUTION INTRAVENOUS at 22:18

## 2021-09-11 RX ADMIN — METOPROLOL TARTRATE 5 MG: 1 INJECTION, SOLUTION INTRAVENOUS at 23:38

## 2021-09-11 ASSESSMENT — PAIN DESCRIPTION - LOCATION: LOCATION: BACK

## 2021-09-11 ASSESSMENT — PAIN SCALES - GENERAL: PAINLEVEL_OUTOF10: 10

## 2021-09-12 ENCOUNTER — APPOINTMENT (OUTPATIENT)
Dept: CT IMAGING | Facility: HOSPITAL | Age: 71
End: 2021-09-12
Payer: MEDICARE

## 2021-09-12 ENCOUNTER — APPOINTMENT (OUTPATIENT)
Dept: GENERAL RADIOLOGY | Facility: HOSPITAL | Age: 71
End: 2021-09-12
Payer: MEDICARE

## 2021-09-12 VITALS
DIASTOLIC BLOOD PRESSURE: 90 MMHG | HEART RATE: 117 BPM | OXYGEN SATURATION: 96 % | SYSTOLIC BLOOD PRESSURE: 131 MMHG | HEIGHT: 70 IN | BODY MASS INDEX: 25.77 KG/M2 | WEIGHT: 180 LBS | RESPIRATION RATE: 18 BRPM

## 2021-09-12 PROCEDURE — 71045 X-RAY EXAM CHEST 1 VIEW: CPT

## 2021-09-12 PROCEDURE — 74176 CT ABD & PELVIS W/O CONTRAST: CPT

## 2021-09-12 PROCEDURE — 6360000002 HC RX W HCPCS: Performed by: FAMILY MEDICINE

## 2021-09-12 PROCEDURE — 2580000003 HC RX 258: Performed by: FAMILY MEDICINE

## 2021-09-12 PROCEDURE — 2500000003 HC RX 250 WO HCPCS: Performed by: FAMILY MEDICINE

## 2021-09-12 PROCEDURE — 96375 TX/PRO/DX INJ NEW DRUG ADDON: CPT

## 2021-09-12 PROCEDURE — C9113 INJ PANTOPRAZOLE SODIUM, VIA: HCPCS | Performed by: FAMILY MEDICINE

## 2021-09-12 PROCEDURE — 96365 THER/PROPH/DIAG IV INF INIT: CPT

## 2021-09-12 RX ORDER — METOPROLOL TARTRATE 5 MG/5ML
5 INJECTION INTRAVENOUS ONCE
Status: COMPLETED | OUTPATIENT
Start: 2021-09-12 | End: 2021-09-12

## 2021-09-12 RX ORDER — 0.9 % SODIUM CHLORIDE 0.9 %
1000 INTRAVENOUS SOLUTION INTRAVENOUS ONCE
Status: COMPLETED | OUTPATIENT
Start: 2021-09-12 | End: 2021-09-12

## 2021-09-12 RX ORDER — PANTOPRAZOLE SODIUM 40 MG/10ML
40 INJECTION, POWDER, LYOPHILIZED, FOR SOLUTION INTRAVENOUS DAILY
Status: DISCONTINUED | OUTPATIENT
Start: 2021-09-12 | End: 2021-09-12 | Stop reason: HOSPADM

## 2021-09-12 RX ORDER — PROMETHAZINE HYDROCHLORIDE 25 MG/1
25 TABLET ORAL
Qty: 15 TABLET | Refills: 0 | Status: SHIPPED | OUTPATIENT
Start: 2021-09-12 | End: 2021-09-19

## 2021-09-12 RX ORDER — PANTOPRAZOLE SODIUM 40 MG/10ML
INJECTION, POWDER, LYOPHILIZED, FOR SOLUTION INTRAVENOUS
Status: DISCONTINUED
Start: 2021-09-12 | End: 2021-09-12

## 2021-09-12 RX ORDER — FAMOTIDINE 20 MG/1
20 TABLET, FILM COATED ORAL 2 TIMES DAILY
Qty: 15 TABLET | Refills: 0 | Status: ON HOLD | OUTPATIENT
Start: 2021-09-12 | End: 2021-10-14 | Stop reason: ALTCHOICE

## 2021-09-12 RX ORDER — ONDANSETRON 4 MG/1
4 TABLET, ORALLY DISINTEGRATING ORAL EVERY 8 HOURS PRN
Qty: 12 TABLET | Refills: 0 | Status: ON HOLD | OUTPATIENT
Start: 2021-09-12 | End: 2021-10-14

## 2021-09-12 RX ORDER — PANTOPRAZOLE SODIUM 40 MG/10ML
40 INJECTION, POWDER, LYOPHILIZED, FOR SOLUTION INTRAVENOUS ONCE
Status: COMPLETED | OUTPATIENT
Start: 2021-09-12 | End: 2021-09-12

## 2021-09-12 RX ORDER — ONDANSETRON 2 MG/ML
4 INJECTION INTRAMUSCULAR; INTRAVENOUS ONCE
Status: COMPLETED | OUTPATIENT
Start: 2021-09-12 | End: 2021-09-12

## 2021-09-12 RX ORDER — SODIUM CHLORIDE 9 MG/ML
10 INJECTION INTRAVENOUS DAILY
Status: DISCONTINUED | OUTPATIENT
Start: 2021-09-12 | End: 2021-09-12 | Stop reason: HOSPADM

## 2021-09-12 RX ADMIN — Medication 12.5 MG: at 00:58

## 2021-09-12 RX ADMIN — PANTOPRAZOLE SODIUM 40 MG: 40 INJECTION, POWDER, FOR SOLUTION INTRAVENOUS at 00:56

## 2021-09-12 RX ADMIN — FAMOTIDINE 20 MG: 10 INJECTION, SOLUTION INTRAVENOUS at 00:57

## 2021-09-12 RX ADMIN — SODIUM CHLORIDE 1000 ML: 9 INJECTION, SOLUTION INTRAVENOUS at 00:57

## 2021-09-12 RX ADMIN — METOPROLOL TARTRATE 5 MG: 1 INJECTION, SOLUTION INTRAVENOUS at 02:25

## 2021-09-12 RX ADMIN — ONDANSETRON 4 MG: 2 INJECTION INTRAMUSCULAR; INTRAVENOUS at 02:25

## 2021-09-12 ASSESSMENT — ENCOUNTER SYMPTOMS
NAUSEA: 1
VOMITING: 1

## 2021-09-12 NOTE — ED NOTES
Pt's sister, Severiano Keeler, called to check on pt. Pt ok with Severiano Keeler being given updates regarding his status. Her phone number is [phone].      Ellyn Griffin RN  09/11/21 0574

## 2021-09-12 NOTE — ED NOTES
09/11/21 2300   CIWA-Ar   BP (!) 154/129   Pulse 135   Nausea and Vomiting 3   Tactile Disturbances 0   Tremor 0   Auditory Disturbances 0   Paroxysmal Sweats 7   Visual Disturbances 0   Anxiety 1   Headache, Fullness in Head 0   Agitation 0   Orientation and Clouding of Sensorium 0   CIWA-Ar Total 11   MD updated, Ativan ordered and given.       Celeste Rodarte, OMID  09/11/21 9842

## 2021-09-12 NOTE — ED NOTES
PT called out multiple times for water and ice, he was given ice water once by [facility] and was then given ice by me at this time. Upon entering his room I noticed he had tap water in a cup. He stated he almost fell walking to the sink but he was so thirsty he just couldn't stand it. I put up the bed rails at this time in hopes to keep him safe.       Herberth Saul   09/11/21 8705       Herberth Saul  09/11/21 1222

## 2021-09-12 NOTE — ED NOTES
Pt verbalized understanding of DC and FU instructions. No questions. Pt was ready to go home.       Jose Alfredo Santos RN  09/12/21 7941

## 2021-09-12 NOTE — ED PROVIDER NOTES
[address]  eMERGENCY dEPARTMENT eNCOUnter      Pt Name: Daisy Canada  MRN: [id number]  [place] 1950  Date of evaluation: 9/11/2021  Provider: Jazmyne [address]       Chief Complaint   Patient presents with    Nausea & Vomiting         HISTORY OF PRESENT ILLNESS   (Location/Symptom, Timing/Onset, Context/Setting, Quality, Duration, Modifying Factors, Severity)  Note limiting factors. Daisy Canada is a 73 y.o. male who presents to the emergency department for having nausea and vomiting. Pt states that he started having vomiting this morning. Pt brought by [facility], pt with history of drinking but said that he only drinks about a couple times a month, about 1/5 a month. Pt states that he took a spoonful of liquor trying to get his sinuses clear. Pt without diarrhea. No abdominal pain. No chest pain, no SOA, no fever, no COVID exposure. No confusion, no headache. No confusion, no tremors/shaking. No wounds. No URI symptoms other than sinuses. Pt states that he quit taking his routine medications including his BP meds over a week ago. Nursing Notes were reviewed. REVIEW OF SYSTEMS    (2-9 systems for level 4, 10 or more forlevel 5)     Review of Systems   Gastrointestinal: Positive for nausea and vomiting. All other systems reviewed and are negative.           PAST MEDICAL HISTORY     Past Medical History:   Diagnosis Date    Bipolar affective ([facility]     Cholesterol blood decreased     Chronic back pain     COPD (chronic obstructive pulmonary disease) (HCC)     Depression     DJD (degenerative joint disease)     Environmental allergies     GERD (gastroesophageal reflux disease)     Hypertension     Insomnia     Seizures ([facility]     patient was told a long time ago that he had a seizure         SURGICAL HISTORY       Past Surgical History:   Procedure Laterality Date    APPENDECTOMY      BACK SURGERY      CHOLECYSTECTOMY      HAND SURGERY Right 2014         CURRENT MEDICATIONS       Previous Medications    ALBUTEROL SULFATE  (90 BASE) MCG/ACT INHALER    Inhale 2 puffs into the lungs every 6 hours as needed for Wheezing    BUDESONIDE-FORMOTEROL (SYMBICORT) 160-4.5 MCG/ACT AERO    Inhale 2 puffs into the lungs 2 times daily as needed (episodic wheezing)    CETIRIZINE (ZYRTEC) 10 MG TABLET    Take 1 tablet by mouth daily as needed for Allergies    CHOLECALCIFEROL (VITAMIN D3) 50 MCG (2000 UT) CAPS    Take 1 capsule by mouth daily    CYANOCOBALAMIN (B-12) 2500 MCG SUBL    Place 1 tablet under the tongue daily    DULOXETINE (CYMBALTA) 60 MG EXTENDED RELEASE CAPSULE    Take 1 capsule by mouth 2 times daily    FLUTICASONE (FLONASE) 50 MCG/ACT NASAL SPRAY    1-2 sprays by Nasal route daily as needed for Rhinitis    GABAPENTIN (NEURONTIN) 400 MG CAPSULE    Take 1 capsule by mouth 4 times daily for 30 days. METHOCARBAMOL (ROBAXIN) 750 MG TABLET    Take 1 tablet by mouth 3 times daily as needed (muscle spasms)    METOPROLOL SUCCINATE (TOPROL XL) 25 MG EXTENDED RELEASE TABLET    Take 1 tablet by mouth nightly    MIRTAZAPINE (REMERON) 30 MG TABLET    Take 30 mg by mouth nightly    MULTIPLE VITAMINS-MINERALS (MULTIVITAMIN WITH MINERALS) TABLET    Take 1 tablet by mouth daily    NAPROXEN (NAPROSYN) 500 MG TABLET    Take 1 tablet by mouth 2 times daily as needed for Pain    OMEPRAZOLE (PRILOSEC) 20 MG DELAYED RELEASE CAPSULE    Take 1 capsule by mouth Daily    QUETIAPINE (SEROQUEL) 200 MG TABLET    Take 1 tablet by mouth 2 times daily    QUETIAPINE (SEROQUEL) 300 MG TABLET    Take 1 tablet by mouth nightly       ALLERGIES     Patient has no known allergies. FAMILY HISTORY     History reviewed. No pertinent family history.        SOCIAL HISTORY       Social History     Socioeconomic History    Marital status:      Spouse name: None    Number of children: None    Years of education: None    Highest education level: None   Occupational History  None   Tobacco Use    Smoking status: Current Every Day Smoker     Packs/day: 1.00     Years: 14.00     Pack years: 14.00     Types: Cigarettes    Smokeless tobacco: Never Used   Substance and Sexual Activity    Alcohol use: Yes     Comment: alcohol intoxication 08/03 and 08/04.  Drug use: No    Sexual activity: None   Other Topics Concern    None   Social History Narrative    None     Social Determinants of Health     Financial Resource Strain:     Difficulty of Paying Living Expenses:    Food Insecurity:     Worried About Running Out of Food in the Last Year:     [address] in the Last Year:    Transportation Needs:     Lack of Transportation (Medical):  Lack of Transportation (Non-Medical):    Physical Activity:     Days of Exercise per Week:     Minutes of Exercise per Session:    Stress:     Feeling of Stress :    Social Connections:     Frequency of Communication with Friends and Family:     Frequency of Social Gatherings with Friends and Family:     Attends [employer] Services:     Active Member of Clubs or Organizations:     Attends Club or Organization Meetings:     Marital Status:    Intimate Partner Violence:     Fear of Current or Ex-Partner:     Emotionally Abused:     Physically Abused:     Sexually Abused:        SCREENINGS             PHYSICAL EXAM    (up to 7 for level 4, 8 or more for level 5)     ED Triage Vitals   BP Temp Temp src Pulse Resp SpO2 Height Weight   09/11/21 2130 -- -- 09/11/21 2130 09/11/21 2130 09/11/21 2130 09/11/21 2127 09/11/21 2127   (!) 149/111   134 26 95 % 5' 9.5\" (1.765 m) 180 lb (81.6 kg)       Physical Exam  Vitals and nursing note reviewed. Constitutional:       General: He is not in acute distress. Appearance: Normal appearance. He is not ill-appearing or toxic-appearing. HENT:      Head: Normocephalic. Mouth/Throat:      Mouth: Mucous membranes are moist.      Pharynx: Oropharynx is clear.    Eyes:      Extraocular Movements: Extraocular movements intact. Conjunctiva/sclera: Conjunctivae normal.      Pupils: Pupils are equal, round, and reactive to light. Cardiovascular:      Rate and Rhythm: Regular rhythm. Tachycardia present. Heart sounds: Normal heart sounds. Pulmonary:      Effort: Pulmonary effort is normal.      Breath sounds: Normal breath sounds. Abdominal:      Palpations: Abdomen is soft. Tenderness: There is no abdominal tenderness. Musculoskeletal:         General: Normal range of motion. Cervical back: Neck supple. Skin:     General: Skin is warm and dry. Neurological:      Mental Status: He is alert and oriented to person, place, and time. Psychiatric:         Mood and Affect: Mood normal.         Behavior: Behavior normal.         DIAGNOSTIC RESULTS     EKG: All EKG's are interpreted by the Emergency Department Physician who either signs or Co-signs this chart in the absence of a cardiologist.    ; Sinus Tachycardia; MD normal 134 ms;  ms normal; Nonspecific T wave changes; No acute ST-T elev/dep; Normal axis    RADIOLOGY:   Non-plain film images such as CT, Ultrasound and MRI are read by the radiologist. Plain radiographic images are visualized andpreliminarily interpreted by the emergency physician with the below findings:    CXR - See Below  CT abd/pelvis - See Below    Interpretationper the Radiologist below, if available at the time of this note:    CT ABDOMEN PELVIS WO CONTRAST Additional Contrast? None   Final Result      1. Moderate hiatal hernia. Mild gastric distention. 2.  Otherwise, no acute abnormality on noncontrast CT of abdomen and pelvis. 3.  Diffuse hepatic steatosis. 4.  Colonic diverticulosis. XR CHEST 1 VIEW   Final Result      No acute pulmonary disease.                ED BEDSIDE ULTRASOUND:   Performed by ED Physician - none    LABS:  Labs Reviewed   COMPREHENSIVE METABOLIC PANEL - Abnormal; Notable for the following components: Result Value    Sodium 133 (*)     Chloride 86 (*)     CO2 10 (*)     Anion Gap 37 (*)     Glucose 135 (*)     BUN 25 (*)     Total Protein 9.0 (*)     Albumin 4.9 (*)     Total Bilirubin 1.3 (*)     AST 57 (*)     All other components within normal limits    Narrative:     Wendy Bedoya tel. [phone],  Chemistry results called to and read back by Adriel Farooq RN. [email or web address], 09/11/2021  22:12, by Ricki Gar  Performed at:  [address] [place] [address] Laboratory  [address] [place] [address],  [place], [place] [address]   Phone [phone]   CBC WITH AUTO DIFFERENTIAL - Abnormal; Notable for the following components:    WBC 30.0 (*)     RDW 17.6 (*)     Platelets 581 (*)     Neutrophils Absolute 25.7 (*)     Lymphocytes Absolute 0.8 (*)     Monocytes Absolute 3.2 (*)     All other components within normal limits    Narrative:     Wendy Bedoya tel. [phone],  Hematology results called to and read back by Axel Bell RN, KDE,  09/11/2021 21:53, by Ricki Gar  Performed at:  [address] [place] [address] Laboratory  [address] [place] [address],  [place], [place] [address]   Phone [phone]   LACTIC ACID, PLASMA - Abnormal; Notable for the following components:    Lactic Acid 3.9 (*)     All other components within normal limits    Narrative:     Performed at:  [address] [place] [address] Laboratory  [address] [place] [address],  [place], [place] [address]   Phone [phone], RAPID    Narrative:     Performed at:  [address] [place] [address] Laboratory  [address] [place] [address],  [place], [place] [address]   Phone [phone]   CULTURE, BLOOD 1   AMMONIA    Narrative:     Performed at:  [address] [place] [address] Laboratory  [address] [place] [address],  [place], [place] [address]   Phone [phone]   AMYLASE    Narrative:     Wendy Bedoya tel. [phone],  Chemistry results called to and read back by Adriel Farooq RN. kDE, 09/11/2021  22:12, by Ricki Gar  Performed at:  [facility] [place] [id number] [facility],  [place], [place] [address]   Phone [phone]   LIPASE    Narrative:     Livia Amato  [place]. [phone],  Chemistry results called to and read back by Yi Campos RN. [email or web address], 09/11/2021  22:12, by [facility]  Performed at:  [address] [facility] and [address],  [place], [place] [address]   Phone [phone]   ETHANOL    Narrative:     Performed at:  [address] [facility] and [facility]  [address] [place] [address],  [place], [place] [address]   Phone [phone]       All other labs were within normal range or not returned as of this dictation. EMERGENCY DEPARTMENT COURSE and DIFFERENTIAL DIAGNOSIS/MDM:   Vitals:    Vitals:    09/12/21 0145 09/12/21 0200 09/12/21 0215 09/12/21 0230   BP: (!) 141/76 138/86 (!) 131/90    Pulse: 123 127 126 117   Resp:   18    SpO2: 94% 96% 96%    Weight:       Height:               CRITICAL CARE TIME   Total Critical Care time was 0 minutes, excluding separatelyreportable procedures. There was a high probability ofclinically significant/life threatening deterioration in the patient's condition which required my urgent intervention. CONSULTS:  None    PROCEDURES:  None    PROGRESS NOTES:    Pt brought by EMS. Discussed that we would give nausea meds, give fluids, check labs. Pt noted to possibly be an alcoholic, wife called and said that he has been vomiting for the past week. She said that he drinks all the time but hides it. Conflicting stories. Pt with negative alcohol. WBC came back markedly elevated at 30 K, CT scan of abd/pelvis ordered along with CXR as well as COVID. All came back negative. Pt with vomiting while in CT room. Pt keeps asking for water. Getting up drinking out of sinks. He states he wants to go home, doesn't want to stay here. He doesn't drink every day. Gave dose of Ativan and seemed to keep patient calm.  Pt most likely with reflex tachycardia from abruptly not taking his metoprolol  HR came down to 117 HR. Pt got 2 liters of fluids. /90. Pt already got up and went outside cause wife is coming to get him. No vomiting in room but discussed need for possible admission, lola with 1 week vomiting and 30K WBC. Pt declines and wants to go. No infection found to cause elevated WBC, could be from vomiting, stress induced. COVID negative. Will d/c at this time with anti nausea meds. Discussed to restart meds. Pt said he wasn't going to stop drinking. Vomiting most likely from alcohol inducing gastritis     FINAL IMPRESSION      1. Non-intractable vomiting with nausea, unspecified vomiting type New Problem   2. Acute alcoholic gastritis without hemorrhage New Problem   3. Sinus tachycardia New Problem   4. Leukocytosis, unspecified type New Problem         DISPOSITION/PLAN   DISPOSITION        PATIENT REFERRED TO:    Pt needs to get into seeing PCP in next couple days.  He needs to return to ED if symptoms worsen, HR staying about 120's, continued vomiting, fever develops or other new/concerning symptoms          DISCHARGE MEDICATIONS:  New Prescriptions    FAMOTIDINE (PEPCID) 20 MG TABLET    Take 1 tablet by mouth 2 times daily for 7 days    ONDANSETRON (ZOFRAN ODT) 4 MG DISINTEGRATING TABLET    Take 1 tablet by mouth every 8 hours as needed for Nausea or Vomiting    PROMETHAZINE (PHENERGAN) 25 MG TABLET    Take 1 tablet by mouth every 6-8 hours as needed for Nausea       (Please note that portions of this note were completed with a voice recognition program.  Efforts were made to edit the dictations but occasionallywords are mis-transcribed.)    Jhonatan Cardenas DO (electronically signed)  Attending Emergency Physician          Jhonatan Cardenas DO  09/12/21 8367

## 2021-09-12 NOTE — ED NOTES
Spoke to pt's wife, Sosa Garrett, wife states that pt hasn't been able to drink since Thursday as he has been vomiting. Wife states that pt has been a \"drinker\" for years but he hides it from her. Wife doesn't know how much he drinks but states that he drinks almost every night. MD updated.       Zeeshan Barrett RN  09/12/21 0104

## 2021-09-16 LAB — BLOOD CULTURE, ROUTINE: NORMAL

## 2021-09-16 RX ORDER — METOPROLOL SUCCINATE 25 MG/1
25 TABLET, EXTENDED RELEASE ORAL NIGHTLY
Qty: 30 TABLET | Refills: 3 | OUTPATIENT
Start: 2021-09-16

## 2021-09-24 ENCOUNTER — OFFICE VISIT (OUTPATIENT)
Dept: PRIMARY CARE CLINIC | Age: 71
End: 2021-09-24
Payer: MEDICARE

## 2021-09-24 VITALS
DIASTOLIC BLOOD PRESSURE: 60 MMHG | HEART RATE: 107 BPM | TEMPERATURE: 97.2 F | WEIGHT: 160.8 LBS | HEIGHT: 70 IN | SYSTOLIC BLOOD PRESSURE: 100 MMHG | BODY MASS INDEX: 23.02 KG/M2

## 2021-09-24 DIAGNOSIS — M54.2 CHRONIC NECK PAIN: ICD-10-CM

## 2021-09-24 DIAGNOSIS — G89.29 CHRONIC BACK PAIN, UNSPECIFIED BACK LOCATION, UNSPECIFIED BACK PAIN LATERALITY: ICD-10-CM

## 2021-09-24 DIAGNOSIS — I10 ESSENTIAL HYPERTENSION: Primary | ICD-10-CM

## 2021-09-24 DIAGNOSIS — M54.9 CHRONIC BACK PAIN, UNSPECIFIED BACK LOCATION, UNSPECIFIED BACK PAIN LATERALITY: ICD-10-CM

## 2021-09-24 DIAGNOSIS — K52.9 COLITIS: ICD-10-CM

## 2021-09-24 DIAGNOSIS — G89.29 CHRONIC NECK PAIN: ICD-10-CM

## 2021-09-24 PROCEDURE — 99213 OFFICE O/P EST LOW 20 MIN: CPT | Performed by: NURSE PRACTITIONER

## 2021-09-24 RX ORDER — CIPROFLOXACIN 500 MG/1
500 TABLET, FILM COATED ORAL 2 TIMES DAILY
Qty: 20 TABLET | Refills: 0 | Status: SHIPPED | OUTPATIENT
Start: 2021-09-24 | End: 2021-10-04

## 2021-09-24 NOTE — PROGRESS NOTES
SUBJECTIVE:    Patient ID: Kia Chandra is a 73 y.o. male. Medical History Review  Past Medical, Family, and Social History reviewed. Health Maintenance Due   Topic Date Due    Hepatitis C screen  Never done    COVID-19 Vaccine (1) Never done    Colon cancer screen colonoscopy  Never done    Shingles Vaccine (2 of 2) 10/27/2020    Flu vaccine (1) 09/01/2021       HPI:   Chief Complaint   Patient presents with    Hypertension    Fatigue   He has been having some trouble with stomach cramping, worse on the left side. 30lb weight loss over the past 6 months. He states he has a pretty good appetite. He states he drinks alcohol about twice a month. Patient's medications, allergies, past medical, surgical, social and family histories were reviewed and updated as appropriate. Review of Systems   Constitutional: Negative for chills and fever. HENT: Negative for ear pain and sore throat. Eyes: Negative for pain and visual disturbance. Respiratory: Negative for cough and shortness of breath. Cardiovascular: Negative for chest pain, palpitations and leg swelling. Gastrointestinal: Negative for abdominal pain, nausea and vomiting. Genitourinary: Negative for dysuria and hematuria. Musculoskeletal: Negative for joint swelling. Skin: Negative for rash. Neurological: Negative for dizziness and weakness. Psychiatric/Behavioral: Negative for sleep disturbance. Reviewed and acurate. See MA note. OBJECTIVE:  /60   Pulse 107   Temp 97.2 °F (36.2 °C) (Temporal)   Ht 5' 9.5\" (1.765 m)   Wt 160 lb 12.8 oz (72.9 kg)   BMI 23.41 kg/m²    Physical Exam  Vitals reviewed. Constitutional:       General: He is not in acute distress. Appearance: He is well-developed. HENT:      Head: Normocephalic. Mouth/Throat:      Pharynx: No oropharyngeal exudate. Eyes:      General: Lids are normal.   Cardiovascular:      Rate and Rhythm: Normal rate and regular rhythm.       Heart sounds: Normal heart sounds. Pulmonary:      Effort: Pulmonary effort is normal.      Breath sounds: Normal breath sounds. Abdominal:      General: Bowel sounds are normal. There is no distension. Palpations: Abdomen is soft. Tenderness: There is abdominal tenderness in the left lower quadrant. Musculoskeletal:      Cervical back: Neck supple. Lymphadenopathy:      Cervical: No cervical adenopathy. Skin:     General: Skin is warm and dry. Neurological:      Mental Status: He is alert and oriented to person, place, and time.          Results in Past 30 Days  Result Component Current Result Ref Range Previous Result Ref Range   Albumin/Globulin Ratio 1.2 (9/11/2021) 0.8 - 2.0 Not in Time Range    Albumin 4.9 (H) (9/11/2021) 3.4 - 4.8 g/dL Not in Time Range    Alkaline Phosphatase 47 (9/11/2021) 25 - 100 U/L Not in Time Range    ALT 35 (9/11/2021) 4 - 36 U/L Not in Time Range    AST 57 (H) (9/11/2021) 8 - 33 U/L Not in Time Range    BUN 25 (H) (9/11/2021) 6 - 20 mg/dL Not in Time Range    Calcium 9.8 (9/11/2021) 8.5 - 10.5 mg/dL Not in Time Range    Chloride 86 (L) (9/11/2021) 98 - 107 mmol/L Not in Time Range    CO2 10 (LL) (9/11/2021) 20 - 30 mmol/L Not in Time Range    CREATININE 1.2 (9/11/2021) 0.4 - 1.2 mg/dL Not in Time Range    GFR  >59 (9/11/2021) >59 Not in Time Range    GFR Non- >59 (9/11/2021) >59 Not in Time Range    Globulin 4.1 (9/11/2021) g/dL Not in Time Range    Glucose 135 (H) (9/11/2021) 74 - 106 mg/dL Not in Time Range    Potassium 4.2 (9/11/2021) 3.4 - 5.1 mmol/L Not in Time Range    Sodium 133 (L) (9/11/2021) 136 - 145 mmol/L Not in Time Range    Total Bilirubin 1.3 (H) (9/11/2021) 0.3 - 1.2 mg/dL Not in Time Range    Total Protein 9.0 (H) (9/11/2021) 6.4 - 8.3 g/dL Not in Time Range      Hemoglobin A1C (%)   Date Value   08/17/2015 5.6     Microscopic Examination (no units)   Date Value   08/04/2021 YES     LDL Calculated (mg/dL)   Date Value 06/23/2021 106       Lab Results   Component Value Date    WBC 30.0 (H) 09/11/2021    NEUTROABS 25.7 (H) 09/11/2021    HGB 15.4 09/11/2021    HCT 48.1 09/11/2021    MCV 97.8 09/11/2021     (H) 09/11/2021     Lab Results   Component Value Date    TSH 1.25 10/25/2018       Prior to Visit Medications    Medication Sig Taking? Authorizing Provider   ondansetron (ZOFRAN ODT) 4 MG disintegrating tablet Take 1 tablet by mouth every 8 hours as needed for Nausea or Vomiting Yes Aniya Tiwari, DO   famotidine (PEPCID) 20 MG tablet Take 1 tablet by mouth 2 times daily for 7 days Yes Jair Mayo,    gabapentin (NEURONTIN) 400 MG capsule Take 1 capsule by mouth 4 times daily for 30 days.  Yes DYLAN Cosme   QUEtiapine (SEROQUEL) 300 MG tablet Take 1 tablet by mouth nightly Yes DYLAN Cosme   metoprolol succinate (TOPROL XL) 25 MG extended release tablet Take 1 tablet by mouth nightly Yes DYLAN Cosme   omeprazole (PRILOSEC) 20 MG delayed release capsule Take 1 capsule by mouth Daily Yes DYLAN Cosme   mirtazapine (REMERON) 30 MG tablet Take 30 mg by mouth nightly Yes Historical Provider, MD   methocarbamol (ROBAXIN) 750 MG tablet Take 1 tablet by mouth 3 times daily as needed (muscle spasms) Yes DYLAN Cosme   QUEtiapine (SEROQUEL) 200 MG tablet Take 1 tablet by mouth 2 times daily Yes DYLAN Cosme   cetirizine (ZYRTEC) 10 MG tablet Take 1 tablet by mouth daily as needed for Allergies Yes DYLAN Cosme   Cholecalciferol (VITAMIN D3) 50 MCG (2000 UT) CAPS Take 1 capsule by mouth daily Yes DYLAN Cosme   Cyanocobalamin (B-12) 2500 MCG SUBL Place 1 tablet under the tongue daily Yes DYLAN Cosme   albuterol sulfate  (90 Base) MCG/ACT inhaler Inhale 2 puffs into the lungs every 6 hours as needed for Wheezing Yes DYLAN Cosme   DULoxetine (CYMBALTA) 60 MG extended release capsule Take 1 capsule by mouth 2 times daily Yes Luciana Castro,

## 2021-10-10 ASSESSMENT — ENCOUNTER SYMPTOMS
VOMITING: 0
EYE PAIN: 0
SHORTNESS OF BREATH: 0
NAUSEA: 0
ABDOMINAL PAIN: 0
SORE THROAT: 0
COUGH: 0

## 2021-10-13 ENCOUNTER — HOSPITAL ENCOUNTER (INPATIENT)
Facility: HOSPITAL | Age: 71
LOS: 3 days | Discharge: HOME | DRG: 897 | End: 2021-10-16
Attending: EMERGENCY MEDICINE | Admitting: INTERNAL MEDICINE
Payer: MEDICARE

## 2021-10-13 ENCOUNTER — APPOINTMENT (OUTPATIENT)
Dept: CT IMAGING | Facility: HOSPITAL | Age: 71
DRG: 897 | End: 2021-10-13
Payer: MEDICARE

## 2021-10-13 ENCOUNTER — APPOINTMENT (OUTPATIENT)
Dept: GENERAL RADIOLOGY | Facility: HOSPITAL | Age: 71
DRG: 897 | End: 2021-10-13
Payer: MEDICARE

## 2021-10-13 DIAGNOSIS — H53.2 DOUBLE VISION: ICD-10-CM

## 2021-10-13 DIAGNOSIS — R11.2 INTRACTABLE VOMITING WITH NAUSEA, UNSPECIFIED VOMITING TYPE: Primary | ICD-10-CM

## 2021-10-13 PROBLEM — F10.930 ALCOHOL WITHDRAWAL SYNDROME, UNCOMPLICATED (HCC): Status: ACTIVE | Noted: 2021-10-13

## 2021-10-13 LAB
A/G RATIO: 1.2 (ref 0.8–2)
ALBUMIN SERPL-MCNC: 5 G/DL (ref 3.4–4.8)
ALP BLD-CCNC: 62 U/L (ref 25–100)
ALT SERPL-CCNC: 48 U/L (ref 4–36)
ANION GAP SERPL CALCULATED.3IONS-SCNC: 34 MMOL/L (ref 3–16)
AST SERPL-CCNC: 66 U/L (ref 8–33)
BASOPHILS ABSOLUTE: 0 K/UL (ref 0–0.1)
BASOPHILS RELATIVE PERCENT: 0.2 %
BILIRUB SERPL-MCNC: 1.7 MG/DL (ref 0.3–1.2)
BUN BLDV-MCNC: 31 MG/DL (ref 6–20)
CALCIUM SERPL-MCNC: 9.8 MG/DL (ref 8.5–10.5)
CHLORIDE BLD-SCNC: 81 MMOL/L (ref 98–107)
CO2: 18 MMOL/L (ref 20–30)
CREAT SERPL-MCNC: 1.5 MG/DL (ref 0.4–1.2)
EOSINOPHILS ABSOLUTE: 0 K/UL (ref 0–0.4)
EOSINOPHILS RELATIVE PERCENT: 0.1 %
GFR AFRICAN AMERICAN: 56
GFR NON-AFRICAN AMERICAN: 46
GLOBULIN: 4.1 G/DL
GLUCOSE BLD-MCNC: 134 MG/DL (ref 74–106)
GLUCOSE BLD-MCNC: 136 MG/DL (ref 74–106)
HCT VFR BLD CALC: 48.6 % (ref 40–54)
HEMOGLOBIN: 16 G/DL (ref 13–18)
IMMATURE GRANULOCYTES #: 0.1 K/UL
IMMATURE GRANULOCYTES %: 0.6 % (ref 0–5)
LIPASE: 175 U/L (ref 5.6–51.3)
LYMPHOCYTES ABSOLUTE: 0.7 K/UL (ref 1.5–4)
LYMPHOCYTES RELATIVE PERCENT: 4.5 %
MCH RBC QN AUTO: 32.6 PG (ref 27–32)
MCHC RBC AUTO-ENTMCNC: 32.9 G/DL (ref 31–35)
MCV RBC AUTO: 99 FL (ref 80–100)
MONOCYTES ABSOLUTE: 1.2 K/UL (ref 0.2–0.8)
MONOCYTES RELATIVE PERCENT: 7.2 %
NEUTROPHILS ABSOLUTE: 14.1 K/UL (ref 2–7.5)
NEUTROPHILS RELATIVE PERCENT: 87.4 %
PDW BLD-RTO: 15.9 % (ref 11–16)
PERFORMED ON: ABNORMAL
PLATELET # BLD: 350 K/UL (ref 150–400)
PMV BLD AUTO: 10.4 FL (ref 6–10)
POTASSIUM REFLEX MAGNESIUM: 3.7 MMOL/L (ref 3.4–5.1)
RBC # BLD: 4.91 M/UL (ref 4.5–6)
SARS-COV-2, NAAT: NOT DETECTED
SODIUM BLD-SCNC: 133 MMOL/L (ref 136–145)
TOTAL PROTEIN: 9.1 G/DL (ref 6.4–8.3)
TROPONIN: <0.3 NG/ML
WBC # BLD: 16.2 K/UL (ref 4–11)

## 2021-10-13 PROCEDURE — 6370000000 HC RX 637 (ALT 250 FOR IP): Performed by: PHYSICIAN ASSISTANT

## 2021-10-13 PROCEDURE — 96376 TX/PRO/DX INJ SAME DRUG ADON: CPT

## 2021-10-13 PROCEDURE — 6360000002 HC RX W HCPCS: Performed by: EMERGENCY MEDICINE

## 2021-10-13 PROCEDURE — 74176 CT ABD & PELVIS W/O CONTRAST: CPT

## 2021-10-13 PROCEDURE — 96375 TX/PRO/DX INJ NEW DRUG ADDON: CPT

## 2021-10-13 PROCEDURE — 6360000002 HC RX W HCPCS: Performed by: PHYSICIAN ASSISTANT

## 2021-10-13 PROCEDURE — 2580000003 HC RX 258: Performed by: PHYSICIAN ASSISTANT

## 2021-10-13 PROCEDURE — 2500000003 HC RX 250 WO HCPCS: Performed by: PHYSICIAN ASSISTANT

## 2021-10-13 PROCEDURE — 36415 COLL VENOUS BLD VENIPUNCTURE: CPT

## 2021-10-13 PROCEDURE — 87635 SARS-COV-2 COVID-19 AMP PRB: CPT

## 2021-10-13 PROCEDURE — 85025 COMPLETE CBC W/AUTO DIFF WBC: CPT

## 2021-10-13 PROCEDURE — 80053 COMPREHEN METABOLIC PANEL: CPT

## 2021-10-13 PROCEDURE — 2580000003 HC RX 258: Performed by: EMERGENCY MEDICINE

## 2021-10-13 PROCEDURE — 71045 X-RAY EXAM CHEST 1 VIEW: CPT

## 2021-10-13 PROCEDURE — 84484 ASSAY OF TROPONIN QUANT: CPT

## 2021-10-13 PROCEDURE — 80307 DRUG TEST PRSMV CHEM ANLYZR: CPT

## 2021-10-13 PROCEDURE — 83690 ASSAY OF LIPASE: CPT

## 2021-10-13 PROCEDURE — 93005 ELECTROCARDIOGRAM TRACING: CPT

## 2021-10-13 PROCEDURE — 1200000000 HC SEMI PRIVATE

## 2021-10-13 PROCEDURE — 96361 HYDRATE IV INFUSION ADD-ON: CPT

## 2021-10-13 PROCEDURE — 96374 THER/PROPH/DIAG INJ IV PUSH: CPT

## 2021-10-13 PROCEDURE — 99283 EMERGENCY DEPT VISIT LOW MDM: CPT

## 2021-10-13 RX ORDER — THIAMINE HYDROCHLORIDE 100 MG/ML
100 INJECTION, SOLUTION INTRAMUSCULAR; INTRAVENOUS DAILY
Status: DISCONTINUED | OUTPATIENT
Start: 2021-10-13 | End: 2021-10-16 | Stop reason: HOSPADM

## 2021-10-13 RX ORDER — ACETAMINOPHEN 650 MG/1
650 SUPPOSITORY RECTAL EVERY 6 HOURS PRN
Status: DISCONTINUED | OUTPATIENT
Start: 2021-10-13 | End: 2021-10-16 | Stop reason: HOSPADM

## 2021-10-13 RX ORDER — PANTOPRAZOLE SODIUM 40 MG/1
40 TABLET, DELAYED RELEASE ORAL
Status: DISCONTINUED | OUTPATIENT
Start: 2021-10-14 | End: 2021-10-16 | Stop reason: HOSPADM

## 2021-10-13 RX ORDER — ONDANSETRON 2 MG/ML
4 INJECTION INTRAMUSCULAR; INTRAVENOUS ONCE
Status: COMPLETED | OUTPATIENT
Start: 2021-10-13 | End: 2021-10-13

## 2021-10-13 RX ORDER — SODIUM CHLORIDE 9 MG/ML
INJECTION, SOLUTION INTRAVENOUS CONTINUOUS
Status: DISCONTINUED | OUTPATIENT
Start: 2021-10-13 | End: 2021-10-16 | Stop reason: HOSPADM

## 2021-10-13 RX ORDER — LORAZEPAM 2 MG/ML
3 INJECTION INTRAMUSCULAR
Status: DISCONTINUED | OUTPATIENT
Start: 2021-10-13 | End: 2021-10-16 | Stop reason: HOSPADM

## 2021-10-13 RX ORDER — LORAZEPAM 2 MG/ML
2 INJECTION INTRAMUSCULAR
Status: DISCONTINUED | OUTPATIENT
Start: 2021-10-13 | End: 2021-10-16 | Stop reason: HOSPADM

## 2021-10-13 RX ORDER — LORAZEPAM 0.5 MG/1
2 TABLET ORAL
Status: DISCONTINUED | OUTPATIENT
Start: 2021-10-13 | End: 2021-10-16 | Stop reason: HOSPADM

## 2021-10-13 RX ORDER — POLYETHYLENE GLYCOL 3350 17 G/17G
17 POWDER, FOR SOLUTION ORAL DAILY PRN
Status: DISCONTINUED | OUTPATIENT
Start: 2021-10-13 | End: 2021-10-16 | Stop reason: HOSPADM

## 2021-10-13 RX ORDER — ACETAMINOPHEN 325 MG/1
650 TABLET ORAL EVERY 6 HOURS PRN
Status: DISCONTINUED | OUTPATIENT
Start: 2021-10-13 | End: 2021-10-16 | Stop reason: HOSPADM

## 2021-10-13 RX ORDER — LORAZEPAM 2 MG/ML
1 INJECTION INTRAMUSCULAR
Status: DISCONTINUED | OUTPATIENT
Start: 2021-10-13 | End: 2021-10-16 | Stop reason: HOSPADM

## 2021-10-13 RX ORDER — LORAZEPAM 0.5 MG/1
1 TABLET ORAL
Status: DISCONTINUED | OUTPATIENT
Start: 2021-10-13 | End: 2021-10-16 | Stop reason: HOSPADM

## 2021-10-13 RX ORDER — ONDANSETRON 4 MG/1
4 TABLET, ORALLY DISINTEGRATING ORAL EVERY 8 HOURS PRN
Status: DISCONTINUED | OUTPATIENT
Start: 2021-10-13 | End: 2021-10-16 | Stop reason: HOSPADM

## 2021-10-13 RX ORDER — FOLIC ACID 5 MG/ML
INJECTION, SOLUTION INTRAMUSCULAR; INTRAVENOUS; SUBCUTANEOUS
Status: DISCONTINUED
Start: 2021-10-13 | End: 2021-10-13 | Stop reason: WASHOUT

## 2021-10-13 RX ORDER — 0.9 % SODIUM CHLORIDE 0.9 %
1000 INTRAVENOUS SOLUTION INTRAVENOUS ONCE
Status: DISCONTINUED | OUTPATIENT
Start: 2021-10-13 | End: 2021-10-16 | Stop reason: HOSPADM

## 2021-10-13 RX ORDER — SODIUM CHLORIDE 0.9 % (FLUSH) 0.9 %
5-40 SYRINGE (ML) INJECTION PRN
Status: DISCONTINUED | OUTPATIENT
Start: 2021-10-13 | End: 2021-10-16 | Stop reason: HOSPADM

## 2021-10-13 RX ORDER — ASCORBIC ACID, VITAMIN A PALMITATE, CHOLECALCIFEROL, THIAMINE HYDROCHLORIDE, RIBOFLAVIN-5 PHOSPHATE SODIUM, PYRIDOXINE HYDROCHLORIDE, NIACINAMIDE, DEXPANTHENOL, ALPHA-TOCOPHEROL ACETATE, VITAMIN K1, FOLIC ACID, BIOTIN, CYANOCOBALAMIN 200; 3300; 200; 6; 3.6; 6; 40; 15; 10; 150; 600; 60; 5 MG/10ML; [IU]/10ML; [IU]/10ML; MG/10ML; MG/10ML; MG/10ML; MG/10ML; MG/10ML; [IU]/10ML; UG/10ML; UG/10ML; UG/10ML; UG/10ML
INJECTION, SOLUTION INTRAVENOUS
Status: DISCONTINUED
Start: 2021-10-13 | End: 2021-10-14

## 2021-10-13 RX ORDER — METOPROLOL SUCCINATE 25 MG/1
25 TABLET, EXTENDED RELEASE ORAL NIGHTLY
Status: DISCONTINUED | OUTPATIENT
Start: 2021-10-13 | End: 2021-10-16 | Stop reason: HOSPADM

## 2021-10-13 RX ORDER — 0.9 % SODIUM CHLORIDE 0.9 %
1000 INTRAVENOUS SOLUTION INTRAVENOUS ONCE
Status: COMPLETED | OUTPATIENT
Start: 2021-10-13 | End: 2021-10-13

## 2021-10-13 RX ORDER — LORAZEPAM 2 MG/ML
4 INJECTION INTRAMUSCULAR
Status: DISCONTINUED | OUTPATIENT
Start: 2021-10-13 | End: 2021-10-16 | Stop reason: HOSPADM

## 2021-10-13 RX ORDER — ONDANSETRON 2 MG/ML
4 INJECTION INTRAMUSCULAR; INTRAVENOUS EVERY 6 HOURS PRN
Status: DISCONTINUED | OUTPATIENT
Start: 2021-10-13 | End: 2021-10-16 | Stop reason: HOSPADM

## 2021-10-13 RX ORDER — FOLIC ACID 5 MG/ML
INJECTION, SOLUTION INTRAMUSCULAR; INTRAVENOUS; SUBCUTANEOUS
Status: DISCONTINUED
Start: 2021-10-13 | End: 2021-10-14

## 2021-10-13 RX ORDER — SODIUM CHLORIDE 0.9 % (FLUSH) 0.9 %
5-40 SYRINGE (ML) INJECTION EVERY 12 HOURS SCHEDULED
Status: DISCONTINUED | OUTPATIENT
Start: 2021-10-13 | End: 2021-10-16 | Stop reason: HOSPADM

## 2021-10-13 RX ORDER — LORAZEPAM 2 MG/1
4 TABLET ORAL
Status: DISCONTINUED | OUTPATIENT
Start: 2021-10-13 | End: 2021-10-16 | Stop reason: HOSPADM

## 2021-10-13 RX ORDER — LORAZEPAM 2 MG/ML
2 INJECTION INTRAMUSCULAR ONCE
Status: COMPLETED | OUTPATIENT
Start: 2021-10-13 | End: 2021-10-13

## 2021-10-13 RX ORDER — SODIUM CHLORIDE 9 MG/ML
25 INJECTION, SOLUTION INTRAVENOUS PRN
Status: DISCONTINUED | OUTPATIENT
Start: 2021-10-13 | End: 2021-10-16 | Stop reason: HOSPADM

## 2021-10-13 RX ADMIN — FOLIC ACID: 5 INJECTION, SOLUTION INTRAMUSCULAR; INTRAVENOUS; SUBCUTANEOUS at 23:17

## 2021-10-13 RX ADMIN — LORAZEPAM 1 MG: 2 INJECTION INTRAMUSCULAR; INTRAVENOUS at 19:36

## 2021-10-13 RX ADMIN — ENOXAPARIN SODIUM 40 MG: 40 INJECTION SUBCUTANEOUS at 21:02

## 2021-10-13 RX ADMIN — LORAZEPAM 2 MG: 2 INJECTION INTRAMUSCULAR; INTRAVENOUS at 21:13

## 2021-10-13 RX ADMIN — THIAMINE HYDROCHLORIDE 100 MG: 100 INJECTION, SOLUTION INTRAMUSCULAR; INTRAVENOUS at 18:52

## 2021-10-13 RX ADMIN — ONDANSETRON 4 MG: 2 INJECTION INTRAMUSCULAR; INTRAVENOUS at 18:52

## 2021-10-13 RX ADMIN — SODIUM CHLORIDE 1000 ML: 9 INJECTION, SOLUTION INTRAVENOUS at 15:41

## 2021-10-13 RX ADMIN — METOPROLOL SUCCINATE 25 MG: 25 TABLET, EXTENDED RELEASE ORAL at 21:13

## 2021-10-13 RX ADMIN — ONDANSETRON 4 MG: 2 INJECTION INTRAMUSCULAR; INTRAVENOUS at 15:41

## 2021-10-13 ASSESSMENT — PAIN SCALES - GENERAL: PAINLEVEL_OUTOF10: 0

## 2021-10-13 NOTE — ED NOTES
Attempted to collect urine at this time and patient was unable to provide sample. Patient has urinal at bedside and was advised to call out whenever he was able to provide a sample.       Rosy Kothari  10/13/21 2724

## 2021-10-13 NOTE — ED TRIAGE NOTES
Patient reports drinking alcohol for fives days straight without eating. He stopped about three days ago and has had nausea and vomiting since. He complaints of weakness. His FSBG is 134.

## 2021-10-13 NOTE — ED NOTES
Pt wife updated that pt to be admitted to room 17.       [address], [address] [place] [address]  10/13/21 1066

## 2021-10-13 NOTE — ED NOTES
Pt wife called and RN updated her att.       [facility], [address] [place] [address]  10/13/21 1376

## 2021-10-13 NOTE — ED NOTES
Pt report given to MICHAEL Salcedo RN on floor att, pt to go to rm 17.       [facility]  10/13/21 1926

## 2021-10-13 NOTE — ED PROVIDER NOTES
Other Topics Concern    Not on file   Social History Narrative    Not on file     Social Determinants of Health     Financial Resource Strain:     Difficulty of Paying Living Expenses:    Food Insecurity:     Worried About Running Out of Food in the Last Year:     [address] in the Last Year:    Transportation Needs:     Lack of Transportation (Medical):      Lack of Transportation (Non-Medical):    Physical Activity:     Days of Exercise per Week:     Minutes of Exercise per Session:    Stress:     Feeling of Stress :    Social Connections:     Frequency of Communication with Friends and Family:     Frequency of Social Gatherings with Friends and Family:     Attends [employer] Services:     Active Member of Clubs or Organizations:     Attends Club or Organization Meetings:     Marital Status:    Intimate Partner Violence:     Fear of Current or Ex-Partner:     Emotionally Abused:     Physically Abused:     Sexually Abused:      Current Facility-Administered Medications   Medication Dose Route Frequency Provider Last Rate Last Admin    0.9 % sodium chloride bolus  1,000 mL IntraVENous Once Saintclair Benedict, MD        ondansetron [facility] injection 4 mg  4 mg IntraVENous Once Saintclair Benedict, MD        sodium chloride flush 0.9 % injection 5-40 mL  5-40 mL IntraVENous 2 times per day Saintclair Benedict, MD        sodium chloride flush 0.9 % injection 5-40 mL  5-40 mL IntraVENous PRN Saintclair Benedict, MD        0.9 % sodium chloride infusion  25 mL IntraVENous PRN Saintclair Benedict, MD        thiamine (B-1) injection 100 mg  100 mg IntraVENous Daily Saintclair Benedict, MD        LORazepam (ATIVAN) tablet 1 mg  1 mg Oral Q1H PRN Saintclair Benedict, MD        Or    LORazepam (ATIVAN) injection 1 mg  1 mg IntraVENous Q1H PRN Saintclair Benedict, MD        Or    LORazepam (ATIVAN) tablet 2 mg  2 mg Oral Q1H PRN Saintclair Benedict, MD        Or    LORazepam (ATIVAN) injection 2 mg  2 mg IntraVENous Q1H PRN Marine Boogie MD        Or    LORazepam (ATIVAN) tablet 3 mg  3 mg Oral Q1H PRN Marine Boogie MD        Or    LORazepam (ATIVAN) injection 3 mg  3 mg IntraVENous Q1H PRN Marine Boogie MD        Or    LORazepam (ATIVAN) tablet 4 mg  4 mg Oral Q1H PRN Marine Boogie MD        Or    LORazepam (ATIVAN) injection 4 mg  4 mg IntraVENous Q1H PRN Marine Boogie MD         Current Outpatient Medications   Medication Sig Dispense Refill    ondansetron (ZOFRAN ODT) 4 MG disintegrating tablet Take 1 tablet by mouth every 8 hours as needed for Nausea or Vomiting 12 tablet 0    famotidine (PEPCID) 20 MG tablet Take 1 tablet by mouth 2 times daily for 7 days 15 tablet 0    gabapentin (NEURONTIN) 400 MG capsule Take 1 capsule by mouth 4 times daily for 30 days.  120 capsule 2    QUEtiapine (SEROQUEL) 300 MG tablet Take 1 tablet by mouth nightly 30 tablet 5    metoprolol succinate (TOPROL XL) 25 MG extended release tablet Take 1 tablet by mouth nightly 30 tablet 3    Multiple Vitamins-Minerals (MULTIVITAMIN WITH MINERALS) tablet Take 1 tablet by mouth daily (Patient not taking: Reported on 9/24/2021) 30 tablet 5    omeprazole (PRILOSEC) 20 MG delayed release capsule Take 1 capsule by mouth Daily 30 capsule 5    mirtazapine (REMERON) 30 MG tablet Take 30 mg by mouth nightly      methocarbamol (ROBAXIN) 750 MG tablet Take 1 tablet by mouth 3 times daily as needed (muscle spasms) 90 tablet 5    QUEtiapine (SEROQUEL) 200 MG tablet Take 1 tablet by mouth 2 times daily 60 tablet 5    cetirizine (ZYRTEC) 10 MG tablet Take 1 tablet by mouth daily as needed for Allergies 30 tablet 5    Cholecalciferol (VITAMIN D3) 50 MCG (2000 UT) CAPS Take 1 capsule by mouth daily 30 capsule 5    Cyanocobalamin (B-12) 2500 MCG SUBL Place 1 tablet under the tongue daily 30 tablet 5    albuterol sulfate  (90 Base) MCG/ACT inhaler Inhale 2 puffs into the lungs every 6 hours as needed for Wheezing 1 Inhaler 5    DULoxetine (CYMBALTA) 60 MG extended release capsule Take 1 capsule by mouth 2 times daily 60 capsule 5    fluticasone (FLONASE) 50 MCG/ACT nasal spray 1-2 sprays by Nasal route daily as needed for Rhinitis 1 Bottle 5    budesonide-formoterol (SYMBICORT) 160-4.5 MCG/ACT AERO Inhale 2 puffs into the lungs 2 times daily as needed (episodic wheezing) 1 Inhaler 5     No Known Allergies  Nursing Notes Reviewed    ROS:  At least 10 systems reviewed and otherwise negative except as in the [address] Physical Exam:  ED Triage Vitals [10/13/21 1450]   Enc Vitals Group      BP (!) 130/102      Pulse 125      Resp 28      Temp 97.8 °F (36.6 °C)      Temp Source Oral      SpO2 96 %      Weight 150 lb (68 kg)      Height 5' 9\" (1.753 m)      Head Circumference       Peak Flow       Pain Score       Pain Loc       Pain Edu? Excl. in [address]? My pulse oximetry interpretation is which is within the normal range    GENERAL APPEARANCE: Awake and alert. Cooperative. Cold perspiration noted on forehead  HEAD:  Atraumatic. EYES: EOM's grossly intact. ENT: Mucous membranes are moist.  No trismus. NECK:  Trachea midline. HEART: Tachycardia. Radial pulses 2+. LUNGS: Respirations unlabored. CTAB  ABDOMEN: Soft. Non-tender. No guarding or rebound. EXTREMITIES: No acute deformities. SKIN: Warm and dry. NEUROLOGICAL: No gross facial drooping. Moves all 4 extremities spontaneously. PSYCHIATRIC: Normal mood.     I have reviewed and interpreted all of the currently available lab results from this visit (if applicable):  Results for orders placed or performed during the hospital encounter of 10/13/21   CBC Auto Differential   Result Value Ref Range    WBC 16.2 (H) 4.0 - 11.0 K/uL    RBC 4.91 4.50 - 6.00 M/uL    Hemoglobin 16.0 13.0 - 18.0 g/dL    Hematocrit 48.6 40.0 - 54.0 %    MCV 99.0 80.0 - 100.0 fL    MCH 32.6 (H) 27.0 - 32.0 pg    MCHC 32.9 31.0 - 35.0 g/dL    RDW 15.9 11.0 - 16.0 %    Platelets 016 245 - 805 K/uL    MPV 10.4 (H) 6.0 - 10.0 fL    Neutrophils % 87.4 %    Immature Granulocytes % 0.6 0.0 - 5.0 %    Lymphocytes % 4.5 %    Monocytes % 7.2 %    Eosinophils % 0.1 %    Basophils % 0.2 %    Neutrophils Absolute 14.1 (H) 2.0 - 7.5 K/uL    Immature Granulocytes # 0.1 K/uL    Lymphocytes Absolute 0.7 (L) 1.5 - 4.0 K/uL    Monocytes Absolute 1.2 (H) 0.2 - 0.8 K/uL    Eosinophils Absolute 0.0 0.0 - 0.4 K/uL    Basophils Absolute 0.0 0.0 - 0.1 K/uL   Comprehensive Metabolic Panel w/ Reflex to MG   Result Value Ref Range    Sodium 133 (L) 136 - 145 mmol/L    Potassium reflex Magnesium 3.7 3.4 - 5.1 mmol/L    Chloride 81 (L) 98 - 107 mmol/L    CO2 18 (L) 20 - 30 mmol/L    Anion Gap 34 (H) 3 - 16    Glucose 136 (H) 74 - 106 mg/dL    BUN 31 (H) 6 - 20 mg/dL    CREATININE 1.5 (H) 0.4 - 1.2 mg/dL    GFR Non- 46 (L) >59    GFR  56 (L) >59    Calcium 9.8 8.5 - 10.5 mg/dL    Total Protein 9.1 (H) 6.4 - 8.3 g/dL    Albumin 5.0 (H) 3.4 - 4.8 g/dL    Albumin/Globulin Ratio 1.2 0.8 - 2.0    Total Bilirubin 1.7 (H) 0.3 - 1.2 mg/dL    Alkaline Phosphatase 62 25 - 100 U/L    ALT 48 (H) 4 - 36 U/L    AST 66 (H) 8 - 33 U/L    Globulin 4.1 Not Established g/dL   Lipase   Result Value Ref Range    Lipase 175.0 (H) 5.6 - 51.3 U/L   Troponin   Result Value Ref Range    Troponin <0.30 <0.30 ng/mL   POCT Glucose   Result Value Ref Range    POC Glucose 134 (H) 74 - 106 mg/dl    Performed on [facility]           EKG: (All EKG's are interpreted by myself in the absence of a cardiologist)  12 lead EKG:  Sinus Tachycardia 126  Axis is   Normal  QTc is  normal  There is no specific ST-T wave changes appreciated. There is no clear evidence of acute ischemia or infarction. It was compared against an EKG from none. MDM:  Patient given IV fluid and nausea medication. He states he is feeling better and is requesting something to drink. His blood work at this time shows a leukocytosis of 16,000. His troponin is negative.   His creatinine is 1.5 which is slightly elevated from prior. He does appear somewhat dehydrated with an anion gap of 34, CO2 of 18, chloride of 81. His lipase is 175. His abdominal exam is benign however given his leukocytosis and elevated lipase I have ordered a CTAP. CT of the abdomen pelvis shows fatty liver but no acute findings. Patient remains tachycardic. I have ordered 2 L of fluid. He drank some water while he was here and then had projectile vomiting about 6:40 PM.  Given his ongoing tachycardia and now persistent nausea and vomiting I have discussed with Mariah, the PA who is agreeable with plan for admission. I do not feel that the patient is going through alcohol withdrawal as his last drink was 5 days ago. However I have ordered CIWA protocol including IV thiamine and Ativan as needed. Clinical Impression:  1. Intractable vomiting with nausea, unspecified vomiting type        Disposition Vitals:  [unfilled], [unfilled], [unfilled], [unfilled]    Disposition referral (if applicable):  No follow-up provider specified.     Disposition medications (if applicable):  New Prescriptions    No medications on file         (Please note that portions of this note may have been completed with a voice recognition program. Efforts were made to edit the dictations but occasionally words are mis-transcribed.)    MD Emelia Galarza MD  10/13/21 3296

## 2021-10-13 NOTE — ED NOTES
Pt had x2 epsiodes of emesis att. Dr Benjamin Church aware.       [facility]  10/13/21 8202

## 2021-10-13 NOTE — ED NOTES
Pt cleaned up and gown placed on pt. Pt given IV meds, see Kannan. RN completed CIWA, score resulted 9, waiting for pharmacy to verify meds.       [address] [facility], [address] [place] [address]  10/13/21 8991

## 2021-10-14 PROBLEM — R11.0 NAUSEA: Status: ACTIVE | Noted: 2021-10-14

## 2021-10-14 PROBLEM — G89.29 CHRONIC BACK PAIN: Status: ACTIVE | Noted: 2021-10-14

## 2021-10-14 PROBLEM — D72.829 LEUKOCYTOSIS: Status: ACTIVE | Noted: 2021-10-14

## 2021-10-14 PROBLEM — E87.6 HYPOKALEMIA: Status: ACTIVE | Noted: 2021-10-14

## 2021-10-14 PROBLEM — F10.10 ALCOHOL ABUSE: Status: ACTIVE | Noted: 2021-10-14

## 2021-10-14 PROBLEM — I10 HTN (HYPERTENSION): Status: ACTIVE | Noted: 2021-10-14

## 2021-10-14 PROBLEM — M54.9 CHRONIC BACK PAIN: Status: ACTIVE | Noted: 2021-10-14

## 2021-10-14 PROBLEM — F31.9 BIPOLAR DISORDER (HCC): Status: ACTIVE | Noted: 2021-10-14

## 2021-10-14 PROBLEM — N17.9 ACUTE RENAL FAILURE (ARF) (HCC): Status: ACTIVE | Noted: 2021-10-14

## 2021-10-14 PROBLEM — F10.20 ALCOHOL DEPENDENCE (HCC): Status: ACTIVE | Noted: 2021-10-14

## 2021-10-14 LAB
A/G RATIO: 1.5 (ref 0.8–2)
ALBUMIN SERPL-MCNC: 4.1 G/DL (ref 3.4–4.8)
ALP BLD-CCNC: 48 U/L (ref 25–100)
ALT SERPL-CCNC: 32 U/L (ref 4–36)
ANION GAP SERPL CALCULATED.3IONS-SCNC: 21 MMOL/L (ref 3–16)
AST SERPL-CCNC: 38 U/L (ref 8–33)
BILIRUB SERPL-MCNC: 1.1 MG/DL (ref 0.3–1.2)
BUN BLDV-MCNC: 37 MG/DL (ref 6–20)
CALCIUM SERPL-MCNC: 8.9 MG/DL (ref 8.5–10.5)
CHLORIDE BLD-SCNC: 91 MMOL/L (ref 98–107)
CO2: 24 MMOL/L (ref 20–30)
CREAT SERPL-MCNC: 1.2 MG/DL (ref 0.4–1.2)
GFR AFRICAN AMERICAN: >59
GFR NON-AFRICAN AMERICAN: >59
GLOBULIN: 2.8 G/DL
GLUCOSE BLD-MCNC: 103 MG/DL (ref 74–106)
HCT VFR BLD CALC: 42.7 % (ref 40–54)
HEMOGLOBIN: 14.1 G/DL (ref 13–18)
MAGNESIUM: 2.5 MG/DL (ref 1.7–2.4)
MCH RBC QN AUTO: 32.5 PG (ref 27–32)
MCHC RBC AUTO-ENTMCNC: 33 G/DL (ref 31–35)
MCV RBC AUTO: 98.4 FL (ref 80–100)
PDW BLD-RTO: 15.9 % (ref 11–16)
PLATELET # BLD: 297 K/UL (ref 150–400)
PMV BLD AUTO: 10.9 FL (ref 6–10)
POTASSIUM REFLEX MAGNESIUM: 3.3 MMOL/L (ref 3.4–5.1)
RBC # BLD: 4.34 M/UL (ref 4.5–6)
SODIUM BLD-SCNC: 136 MMOL/L (ref 136–145)
TOTAL PROTEIN: 6.9 G/DL (ref 6.4–8.3)
WBC # BLD: 16.2 K/UL (ref 4–11)

## 2021-10-14 PROCEDURE — 6370000000 HC RX 637 (ALT 250 FOR IP): Performed by: PHYSICIAN ASSISTANT

## 2021-10-14 PROCEDURE — 85027 COMPLETE CBC AUTOMATED: CPT

## 2021-10-14 PROCEDURE — 6360000002 HC RX W HCPCS: Performed by: PHYSICIAN ASSISTANT

## 2021-10-14 PROCEDURE — 36415 COLL VENOUS BLD VENIPUNCTURE: CPT

## 2021-10-14 PROCEDURE — 80053 COMPREHEN METABOLIC PANEL: CPT

## 2021-10-14 PROCEDURE — 1200000000 HC SEMI PRIVATE

## 2021-10-14 PROCEDURE — 99222 1ST HOSP IP/OBS MODERATE 55: CPT | Performed by: INTERNAL MEDICINE

## 2021-10-14 PROCEDURE — 83735 ASSAY OF MAGNESIUM: CPT

## 2021-10-14 RX ORDER — MIRTAZAPINE 15 MG/1
30 TABLET, FILM COATED ORAL NIGHTLY
Status: DISCONTINUED | OUTPATIENT
Start: 2021-10-14 | End: 2021-10-16 | Stop reason: HOSPADM

## 2021-10-14 RX ORDER — DULOXETIN HYDROCHLORIDE 30 MG/1
60 CAPSULE, DELAYED RELEASE ORAL 2 TIMES DAILY
Status: DISCONTINUED | OUTPATIENT
Start: 2021-10-14 | End: 2021-10-16 | Stop reason: HOSPADM

## 2021-10-14 RX ORDER — QUETIAPINE FUMARATE 100 MG/1
300 TABLET, FILM COATED ORAL NIGHTLY
Status: DISCONTINUED | OUTPATIENT
Start: 2021-10-14 | End: 2021-10-16 | Stop reason: HOSPADM

## 2021-10-14 RX ORDER — FLUTICASONE PROPIONATE 50 MCG
1 SPRAY, SUSPENSION (ML) NASAL DAILY PRN
Status: DISCONTINUED | OUTPATIENT
Start: 2021-10-14 | End: 2021-10-16 | Stop reason: HOSPADM

## 2021-10-14 RX ORDER — METHOCARBAMOL 750 MG/1
750 TABLET, FILM COATED ORAL 3 TIMES DAILY PRN
Status: DISCONTINUED | OUTPATIENT
Start: 2021-10-14 | End: 2021-10-16 | Stop reason: HOSPADM

## 2021-10-14 RX ORDER — CETIRIZINE HYDROCHLORIDE 10 MG/1
10 TABLET ORAL DAILY PRN
Status: DISCONTINUED | OUTPATIENT
Start: 2021-10-14 | End: 2021-10-16 | Stop reason: HOSPADM

## 2021-10-14 RX ORDER — M-VIT,TX,IRON,MINS/CALC/FOLIC 27MG-0.4MG
1 TABLET ORAL DAILY
Status: DISCONTINUED | OUTPATIENT
Start: 2021-10-14 | End: 2021-10-16 | Stop reason: HOSPADM

## 2021-10-14 RX ORDER — BUDESONIDE AND FORMOTEROL FUMARATE DIHYDRATE 160; 4.5 UG/1; UG/1
2 AEROSOL RESPIRATORY (INHALATION) 2 TIMES DAILY PRN
Status: DISCONTINUED | OUTPATIENT
Start: 2021-10-14 | End: 2021-10-14 | Stop reason: ALTCHOICE

## 2021-10-14 RX ORDER — POTASSIUM CHLORIDE 750 MG/1
40 CAPSULE, EXTENDED RELEASE ORAL ONCE
Status: COMPLETED | OUTPATIENT
Start: 2021-10-14 | End: 2021-10-14

## 2021-10-14 RX ORDER — VITAMIN B COMPLEX
2000 TABLET ORAL DAILY
Status: DISCONTINUED | OUTPATIENT
Start: 2021-10-14 | End: 2021-10-16 | Stop reason: HOSPADM

## 2021-10-14 RX ORDER — THIAMINE HCL 100 MG
2500 TABLET ORAL DAILY
Status: DISCONTINUED | OUTPATIENT
Start: 2021-10-14 | End: 2021-10-16 | Stop reason: HOSPADM

## 2021-10-14 RX ORDER — GABAPENTIN 400 MG/1
400 CAPSULE ORAL 4 TIMES DAILY
Status: DISCONTINUED | OUTPATIENT
Start: 2021-10-14 | End: 2021-10-16 | Stop reason: HOSPADM

## 2021-10-14 RX ADMIN — GABAPENTIN 400 MG: 400 CAPSULE ORAL at 13:07

## 2021-10-14 RX ADMIN — PANTOPRAZOLE SODIUM 40 MG: 40 TABLET, DELAYED RELEASE ORAL at 05:34

## 2021-10-14 RX ADMIN — MIRTAZAPINE 30 MG: 15 TABLET, FILM COATED ORAL at 20:00

## 2021-10-14 RX ADMIN — Medication 2500 MCG: at 13:07

## 2021-10-14 RX ADMIN — QUETIAPINE FUMARATE 300 MG: 100 TABLET ORAL at 20:00

## 2021-10-14 RX ADMIN — DULOXETINE HYDROCHLORIDE 60 MG: 30 CAPSULE, DELAYED RELEASE ORAL at 13:07

## 2021-10-14 RX ADMIN — DULOXETINE HYDROCHLORIDE 60 MG: 30 CAPSULE, DELAYED RELEASE ORAL at 20:00

## 2021-10-14 RX ADMIN — GABAPENTIN 400 MG: 400 CAPSULE ORAL at 20:00

## 2021-10-14 RX ADMIN — ENOXAPARIN SODIUM 40 MG: 40 INJECTION SUBCUTANEOUS at 09:22

## 2021-10-14 RX ADMIN — METOPROLOL SUCCINATE 25 MG: 25 TABLET, EXTENDED RELEASE ORAL at 20:00

## 2021-10-14 RX ADMIN — LORAZEPAM 1 MG: 2 INJECTION INTRAMUSCULAR; INTRAVENOUS at 13:26

## 2021-10-14 RX ADMIN — LORAZEPAM 1 MG: 2 INJECTION INTRAMUSCULAR; INTRAVENOUS at 00:17

## 2021-10-14 RX ADMIN — POTASSIUM CHLORIDE 40 MEQ: 10 CAPSULE, COATED, EXTENDED RELEASE ORAL at 10:59

## 2021-10-14 RX ADMIN — Medication 1 TABLET: at 13:07

## 2021-10-14 RX ADMIN — Medication 2000 UNITS: at 13:07

## 2021-10-14 RX ADMIN — THIAMINE HYDROCHLORIDE 100 MG: 100 INJECTION, SOLUTION INTRAMUSCULAR; INTRAVENOUS at 09:22

## 2021-10-14 ASSESSMENT — PAIN SCALES - GENERAL: PAINLEVEL_OUTOF10: 0

## 2021-10-14 NOTE — PROGRESS NOTES
I have reviewed patient's home medication list with his wife, Jose J Duran, via telephone    Added: none    Changed: none    Deleted:   -albuterol inhaler  -Symbicort inhaler  -Vitamin B12 2500mcg tablet  -MVI Tablet  -Ondansetron 4mg ODT  -Quetiapine 200mg tablet      Chivo Barragan CPhT

## 2021-10-14 NOTE — PROGRESS NOTES
Gave patient 2 mg IV ativan as ordered, patient fell asleep, continuous pulse ox applied, will continue to monitor patient.

## 2021-10-14 NOTE — PROGRESS NOTES
Peer Recovery Support Note    Name: Galen James  Date: 10/14/2021    Chief Complaint   Patient presents with    Nausea     Patient reports about three days he stopped drinking after drinking alcohol for 5 days straight. He hasn't been able to eat or drink since then and complaints of n/v and weakness.  Emesis       Peer Support met with patient. [x] Support and education provided  [x] Resources provided   [] Treatment referral:   [] Other:   [] Patient declined peer recovery services     Referred By: Cezar Giron    Notes: When I walked into the room the pt was sitting up on the side of his bed drinking some coffee. I explained to him who I was and what a PSS meant. He told me he was going to call his wife and have her come get him if she would. I asked him how long and how much he had been drinking and he said a 5th to a half a 5th a day for the past 6 months. He said he just wanted to stop cold turkey. I explain to him the risk of alcohol withdrawals and got him convinced to stay a little longer so there would be someone here to monitor him. He was starting to get fidgety and picking things so I let the nurse know. He was going to sign my release paper but then coffee got spilled everywhere. I will go back and have him sign the paper and leave the resource packet on the table once he gets settled down some.      Signed: Shazia Leggett, 10/14/2021

## 2021-10-14 NOTE — FLOWSHEET NOTE
10/14/21 1015   Assessment   Charting Type Shift assessment   Neurological   Neuro (WDL) WDL   Level of Consciousness Alert (0)   Scott Coma Scale   Eye Opening 4   Best Verbal Response 5   Best Motor Response 6   Scott Coma Scale Score 15   HEENT   HEENT (WDL) X   Teeth Other (Comment)  (poor dentition)   Respiratory   Respiratory (WDL) WDL   Respiratory Pattern Regular   Respiratory Depth Normal   Respiratory Quality/Effort Unlabored   Breath Sounds   Right Upper Lobe Clear   Right Middle Lobe Diminished   Right Lower Lobe Diminished   Left Upper Lobe Clear   Left Lower Lobe Diminished   Cardiac   Cardiac (WDL) X   Cardiac Regularity Regular   Cardiac Rhythm Sinus tachy   Rhythm Interpretation   Pulse 115   Cardiac Monitor   Telemetry Monitor On Yes   Telemetry Audible Yes   Telemetry Alarms Set Yes   Telemetry Box Number [id number]   Telemetry Monitor Alarm Parameters    Gastrointestinal   Abdominal (WDL) WDL   Peripheral Vascular   Peripheral Vascular (WDL) WDL   Skin Color/Condition   Skin Color/Condition (WDL) WDL   Skin Integrity   Skin Integrity (WDL) X   Skin Integrity Bruising; Other (Comment)  (scabs)   Musculoskeletal   Musculoskeletal (WDL) X   RUE Weakness   LUE Weakness   RL Extremity Weakness   LL Extremity Weakness   Genitourinary   Genitourinary (WDL) WDL   Urine Assessment   Urine Color Estelita   Psychosocial   Psychosocial (WDL) WDL

## 2021-10-14 NOTE — CARE COORDINATION
Hold per nursing recommendation at this time. Will attempt on 10/15 barring any complications/concerns.

## 2021-10-14 NOTE — PLAN OF CARE
Problem: Falls - Risk of:  Goal: Will remain free from falls  Description: Will remain free from falls  10/14/2021 1014 by Pati Edmond RN  Outcome: Ongoing  10/14/2021 0409 by Geneva Moon RN  Outcome: Ongoing  Goal: Absence of physical injury  Description: Absence of physical injury  10/14/2021 0409 by Geneva Moon RN  Outcome: Ongoing

## 2021-10-14 NOTE — H&P
History and Physical    Patient:  Rocio Soto    CHIEF COMPLAINT:    Nausea  Stopped drinking whiskey 5 days prior to presentation  Poor po intake  Generalized weakness    HISTORY OF PRESENT ILLNESS:   The patient is a 73 y.o. male with PMH of ETOH abuse and dependence, bipolar disorder, HLD, chronic back pain, COPD, depression, GERD, HTN, insomnia and ?seizures who presented to the ER with complaints of nausea, poor po intake, generalized weakness. He states symptoms started 5 days ago after he stopped drinking his daily fifth of whiskey. Pt says he wants to stop drinking permanently. He denies any history of withdrawal seizures or DTs. Routine evaluation performed in the ED. patient noted to be tachycardic with heart rate in the 120s upon arrival.  He was also hypertensive with blood pressure 130/102. Labs remarkable for WBC 16.2, sodium 133, chloride 81, CO2 18, BUN 31, creatinine 1.5, anion gap 34, bilirubin 1.7, lipase 175. COVID-19 not detected. Chest x-ray unremarkable. CT abdomen pelvis with no CT evidence of acute pancreatitis. Diffuse hepatic steatosis. Hiatal hernia. Colonic diverticulosis. 2 mm right renal calculus of doubtful significance. Patient was given IV fluids and vitamin supplementation. He was admitted for further care. Overnight, patient remained tachycardic with heart rate 120s. He was given additional IV fluids and home metoprolol was restarted. Heart rate currently in the 110s this morning. Patient denies any acute complaints.     Past Medical History:      Diagnosis Date    Bipolar affective ([address]     Cholesterol blood decreased     Chronic back pain     COPD (chronic obstructive pulmonary disease) (HCC)     Depression     DJD (degenerative joint disease)     Environmental allergies     GERD (gastroesophageal reflux disease)     Hypertension     Insomnia     Seizures ([address]     patient was told a long time ago that he had a seizure       Past Surgical History:      Procedure Laterality Date    APPENDECTOMY      BACK SURGERY      CHOLECYSTECTOMY      HAND SURGERY Right 2014       Medications Prior to Admission:    Prior to Admission medications    Medication Sig Start Date End Date Taking? Authorizing Provider   gabapentin (NEURONTIN) 400 MG capsule Take 1 capsule by mouth 4 times daily for 30 days.  9/3/21  Yes DYLAN Reed   QUEtiapine (SEROQUEL) 300 MG tablet Take 1 tablet by mouth nightly 6/23/21  Yes DYLAN Reed   metoprolol succinate (TOPROL XL) 25 MG extended release tablet Take 1 tablet by mouth nightly 6/14/21  Yes DYLAN Reed   omeprazole (PRILOSEC) 20 MG delayed release capsule Take 1 capsule by mouth Daily 6/4/21  Yes DYLAN Reed   methocarbamol (ROBAXIN) 750 MG tablet Take 1 tablet by mouth 3 times daily as needed (muscle spasms) 2/23/21  Yes DYLAN Reed   cetirizine (ZYRTEC) 10 MG tablet Take 1 tablet by mouth daily as needed for Allergies 9/18/20  Yes DYLAN Reed   DULoxetine (CYMBALTA) 60 MG extended release capsule Take 1 capsule by mouth 2 times daily 8/31/20  Yes DYLAN Reed   Multiple Vitamins-Minerals (MULTIVITAMIN WITH MINERALS) tablet Take 1 tablet by mouth daily  Patient not taking: Reported on 9/24/2021 6/4/21   DYLAN Reed   mirtazapine (REMERON) 30 MG tablet Take 30 mg by mouth nightly    Historical Provider, MD   Cholecalciferol (VITAMIN D3) 50 MCG (2000 UT) CAPS Take 1 capsule by mouth daily 9/18/20   DYLAN Reed   Cyanocobalamin (B-12) 2500 MCG SUBL Place 1 tablet under the tongue daily 8/31/20   DYLAN Reed   albuterol sulfate  (90 Base) MCG/ACT inhaler Inhale 2 puffs into the lungs every 6 hours as needed for Wheezing 8/31/20   DYLAN Reed   fluticasone [facility]) 50 MCG/ACT nasal spray 1-2 sprays by Nasal route daily as needed for Rhinitis 8/31/20   DYLAN Reed   budesonide-formoterol (SYMBICORT) 160-4.5 MCG/ACT AERO Inhale 2 puffs into the lungs 2 times daily as needed (episodic wheezing) 8/31/20   Dave Montes De Oca, APRTHAI       Allergies:  Patient has no known allergies. Social History:   TOBACCO:   reports that he has been smoking cigarettes. He has a 14.00 pack-year smoking history. He has never used smokeless tobacco.  ETOH:   reports current alcohol use. OCCUPATION:  None     Family History:   History reviewed. No pertinent family history. Review of system  Constitutional:  Denies fever or chills. Positive for generalized weakness. Eyes:  Denies eye pain or redness  HENT:  Denies nasal congestion or sore throat   Respiratory:  Denies cough or shortness of breath   Cardiovascular:  Denies chest pain or edema   GI:  Denies abdominal pain, vomiting, bloody stools or diarrhea. Positive for nausea. :  Denies dysuria or frequency  Musculoskeletal:  Denies acute neck pain or body aches  Integument:  Denies rash or itching  Neurologic:  Denies headache, dizziness, numbness, tingling or unilateral weakness  Psychiatric:  Denies acute depression or acute anxiety      Vital Signs  Temp: 97.9 °F (36.6 °C)  Pulse: 115  Resp: 18  BP: 104/85  SpO2: 93 %  O2 Device: None (Room air)  O2 Flow Rate (L/min): 2 L/min    vital signs reviewed in electronic chart. Physical exam  Constitutional:  Well developed, well nourished, no acute distress  Eyes:  PERRL, no scleral icterus, conjunctiva normal   HENT:  Atraumatic, external ears normal, nose normal, oropharynx moist, no pharyngeal exudates. Neck- supple, no JVD, no lymphadenopathy  Respiratory:  No respiratory distress, no wheezing, rales or rhonchi detected  Cardiovascular: Tachycardic, normal rhythm, no murmurs, no gallops, no rubs, no edema   GI:  Soft, nondistended, normal bowel sounds, nontender, no voluntary guarding  Musculoskeletal:  No cyanosis or obvious acute deformity. Moving all extremities   Integument:  Warm and dry.   Lymphatic:  No cervical or axillary lymphadenopathy noted   Neurologic:  Alert & oriented x 3, no apparent focal deficits noted   Psychiatric:  Speech and behavior appropriate         Lab Results   Component Value Date     10/14/2021    K 3.3 (L) 10/14/2021    CL 91 (L) 10/14/2021    CO2 24 10/14/2021    BUN 37 (H) 10/14/2021    CREATININE 1.2 10/14/2021    GLUCOSE 103 10/14/2021    CALCIUM 8.9 10/14/2021    PROT 6.9 10/14/2021    LABALBU 4.1 10/14/2021    BILITOT 1.1 10/14/2021    ALKPHOS 48 10/14/2021    AST 38 (H) 10/14/2021    ALT 32 10/14/2021    LABGLOM >59 10/14/2021    GFRAA >59 10/14/2021    AGRATIO 1.5 10/14/2021    GLOB 2.8 10/14/2021           Lab Results   Component Value Date    WBC 16.2 (H) 10/14/2021    HGB 14.1 10/14/2021    HCT 42.7 10/14/2021    MCV 98.4 10/14/2021     10/14/2021       PA/lat CXR:   XR CHEST PORTABLE   Final Result      No acute pulmonary disease. CT ABDOMEN PELVIS WO CONTRAST Additional Contrast? None   Final Result   Impression:   1. No CT evidence of acute pancreatitis. 2. Diffuse hepatic steatosis. 3. Hiatal hernia. 4. Colonic diverticulosis. 5. 2 mm right renal calculus of doubtful significance. EKG: Sinus tachycardia with heart rate 123. No acute changes.     Assessment and Plan     Active Hospital Problems    Diagnosis Date Noted    Alcohol dependence ([facility] [F10.20]  -Counseled on cessation  -Peers  consulted   10/14/2021    Alcohol abuse [F10.10]  -Counseled on cessation  - consulted   10/14/2021    Bipolar disorder ([facility] [F31.9]  -Continue home duloxetine and seroquel   10/14/2021    Chronic back pain [M54.9, G89.29]  -Continue home muscle relaxer, gabapentin and as needed Tylenol   10/14/2021    HTN (hypertension) [I10]  -Continue home metoprolol   10/14/2021    Alcohol withdrawal syndrome, uncomplicated ([facility] [U22.818]  -Patient's last drink was 5 days prior to arrival.  Hypertensive and tachycardic on arrival.  No mental status changes. No current tremors. -CIWA scores low. Last score documented was 2.  -continue PRN Ativan per CIWA protocol  -continue MVI, thiamine and folate  -continue hydration with IVFs    Nausea  -Suspect secondary to acute alcohol withdrawal  -Continue as needed antiemetics and IV fluids    Leukocytosis  -Suspect reactive. Infectious work-up negative for any acute etiology.  -Continue to monitor    Hypokalemia  -Replace  -Monitor and replete as needed    Acute renal failure  -Serum creatinine 1.5 on 10/13. Improved to 1.2 with IV fluids.  -Suspect MAXWELL secondary to poor p.o. intake from nausea in setting of acute alcohol withdrawal  -Continue hydration with IV fluids  -Avoid nephrotoxins and NSAIDs 10/13/2021     Patient was seen and examined by Dr. Haleigh Neely and plan of care reviewed.       NGA Stanton certifies per CMS regulation for 42 .15(a), that the patient may reasonably be expected to be discharged or transferred to a hospital within 96 hours after admission to [address] [place] [address]    Electronically signed by NGA Stanton on 10/14/2021 at 11:42 AM

## 2021-10-15 ENCOUNTER — APPOINTMENT (OUTPATIENT)
Dept: CT IMAGING | Facility: HOSPITAL | Age: 71
DRG: 897 | End: 2021-10-15
Payer: MEDICARE

## 2021-10-15 LAB
A/G RATIO: 1.7 (ref 0.8–2)
ALBUMIN SERPL-MCNC: 3.7 G/DL (ref 3.4–4.8)
ALP BLD-CCNC: 40 U/L (ref 25–100)
ALT SERPL-CCNC: 24 U/L (ref 4–36)
AMPHETAMINE SCREEN, URINE: ABNORMAL
ANION GAP SERPL CALCULATED.3IONS-SCNC: 13 MMOL/L (ref 3–16)
AST SERPL-CCNC: 29 U/L (ref 8–33)
BACTERIA: ABNORMAL /HPF
BARBITURATE SCREEN URINE: ABNORMAL
BENZODIAZEPINE SCREEN, URINE: POSITIVE
BILIRUB SERPL-MCNC: 1 MG/DL (ref 0.3–1.2)
BILIRUBIN URINE: ABNORMAL
BLOOD, URINE: ABNORMAL
BUN BLDV-MCNC: 30 MG/DL (ref 6–20)
BUPRENORPHINE QUAL, URINE: ABNORMAL
CALCIUM SERPL-MCNC: 8.4 MG/DL (ref 8.5–10.5)
CANNABINOID SCREEN URINE: ABNORMAL
CHLORIDE BLD-SCNC: 96 MMOL/L (ref 98–107)
CLARITY: ABNORMAL
CO2: 26 MMOL/L (ref 20–30)
COCAINE METABOLITE SCREEN URINE: ABNORMAL
COLOR: YELLOW
CREAT SERPL-MCNC: 0.9 MG/DL (ref 0.4–1.2)
CRYSTALS, UA: ABNORMAL /HPF
GFR AFRICAN AMERICAN: >59
GFR NON-AFRICAN AMERICAN: >59
GLOBULIN: 2.2 G/DL
GLUCOSE BLD-MCNC: 93 MG/DL (ref 74–106)
GLUCOSE URINE: NEGATIVE MG/DL
HCT VFR BLD CALC: 40.5 % (ref 40–54)
HEMOGLOBIN: 12.9 G/DL (ref 13–18)
KETONES, URINE: 15 MG/DL
LEUKOCYTE ESTERASE, URINE: ABNORMAL
Lab: ABNORMAL
MAGNESIUM: 2.2 MG/DL (ref 1.7–2.4)
MCH RBC QN AUTO: 32.6 PG (ref 27–32)
MCHC RBC AUTO-ENTMCNC: 31.9 G/DL (ref 31–35)
MCV RBC AUTO: 102.3 FL (ref 80–100)
METHADONE SCREEN, URINE: ABNORMAL
METHAMPHETAMINE, URINE: ABNORMAL
MICROSCOPIC EXAMINATION: YES
NITRITE, URINE: POSITIVE
OPIATE SCREEN URINE: ABNORMAL
PDW BLD-RTO: 15.9 % (ref 11–16)
PH UA: 6 (ref 5–8)
PHENCYCLIDINE SCREEN URINE: ABNORMAL
PLATELET # BLD: 197 K/UL (ref 150–400)
PMV BLD AUTO: 10.8 FL (ref 6–10)
POTASSIUM REFLEX MAGNESIUM: 3.4 MMOL/L (ref 3.4–5.1)
PROPOXYPHENE SCREEN, URINE: ABNORMAL
PROTEIN UA: 30 MG/DL
RBC # BLD: 3.96 M/UL (ref 4.5–6)
RBC UA: ABNORMAL /HPF (ref 0–4)
SODIUM BLD-SCNC: 135 MMOL/L (ref 136–145)
SPECIFIC GRAVITY UA: 1.02 (ref 1–1.03)
TOTAL PROTEIN: 5.9 G/DL (ref 6.4–8.3)
TRICYCLIC, URINE: POSITIVE
UR OXYCODONE RAPID SCREEN: ABNORMAL
URINE REFLEX TO CULTURE: YES
URINE TYPE: ABNORMAL
UROBILINOGEN, URINE: 1 E.U./DL
WBC # BLD: 10.4 K/UL (ref 4–11)
WBC UA: ABNORMAL /HPF (ref 0–5)

## 2021-10-15 PROCEDURE — 87086 URINE CULTURE/COLONY COUNT: CPT

## 2021-10-15 PROCEDURE — 85027 COMPLETE CBC AUTOMATED: CPT

## 2021-10-15 PROCEDURE — 6360000002 HC RX W HCPCS: Performed by: PHYSICIAN ASSISTANT

## 2021-10-15 PROCEDURE — 83735 ASSAY OF MAGNESIUM: CPT

## 2021-10-15 PROCEDURE — 97530 THERAPEUTIC ACTIVITIES: CPT

## 2021-10-15 PROCEDURE — 97161 PT EVAL LOW COMPLEX 20 MIN: CPT

## 2021-10-15 PROCEDURE — 81001 URINALYSIS AUTO W/SCOPE: CPT

## 2021-10-15 PROCEDURE — 97165 OT EVAL LOW COMPLEX 30 MIN: CPT

## 2021-10-15 PROCEDURE — 6370000000 HC RX 637 (ALT 250 FOR IP): Performed by: PHYSICIAN ASSISTANT

## 2021-10-15 PROCEDURE — 99232 SBSQ HOSP IP/OBS MODERATE 35: CPT | Performed by: INTERNAL MEDICINE

## 2021-10-15 PROCEDURE — 70450 CT HEAD/BRAIN W/O DYE: CPT

## 2021-10-15 PROCEDURE — 1200000000 HC SEMI PRIVATE

## 2021-10-15 PROCEDURE — 2700000000 HC OXYGEN THERAPY PER DAY

## 2021-10-15 PROCEDURE — 2580000003 HC RX 258: Performed by: PHYSICIAN ASSISTANT

## 2021-10-15 PROCEDURE — 80053 COMPREHEN METABOLIC PANEL: CPT

## 2021-10-15 PROCEDURE — 36415 COLL VENOUS BLD VENIPUNCTURE: CPT

## 2021-10-15 RX ORDER — POTASSIUM CHLORIDE 750 MG/1
40 CAPSULE, EXTENDED RELEASE ORAL ONCE
Status: COMPLETED | OUTPATIENT
Start: 2021-10-15 | End: 2021-10-15

## 2021-10-15 RX ADMIN — QUETIAPINE FUMARATE 300 MG: 100 TABLET ORAL at 20:47

## 2021-10-15 RX ADMIN — POTASSIUM CHLORIDE 40 MEQ: 750 CAPSULE, EXTENDED RELEASE ORAL at 14:07

## 2021-10-15 RX ADMIN — DULOXETINE HYDROCHLORIDE 60 MG: 30 CAPSULE, DELAYED RELEASE ORAL at 08:31

## 2021-10-15 RX ADMIN — Medication 10 ML: at 22:12

## 2021-10-15 RX ADMIN — Medication 2000 UNITS: at 08:31

## 2021-10-15 RX ADMIN — ENOXAPARIN SODIUM 40 MG: 40 INJECTION SUBCUTANEOUS at 08:32

## 2021-10-15 RX ADMIN — GABAPENTIN 400 MG: 400 CAPSULE ORAL at 22:05

## 2021-10-15 RX ADMIN — GABAPENTIN 400 MG: 400 CAPSULE ORAL at 08:31

## 2021-10-15 RX ADMIN — PANTOPRAZOLE SODIUM 40 MG: 40 TABLET, DELAYED RELEASE ORAL at 17:41

## 2021-10-15 RX ADMIN — Medication 2500 MCG: at 08:31

## 2021-10-15 RX ADMIN — GABAPENTIN 400 MG: 400 CAPSULE ORAL at 17:40

## 2021-10-15 RX ADMIN — SODIUM CHLORIDE: 9 INJECTION, SOLUTION INTRAVENOUS at 03:18

## 2021-10-15 RX ADMIN — METHOCARBAMOL TABLETS 750 MG: 750 TABLET, COATED ORAL at 20:48

## 2021-10-15 RX ADMIN — PANTOPRAZOLE SODIUM 40 MG: 40 TABLET, DELAYED RELEASE ORAL at 05:25

## 2021-10-15 RX ADMIN — GABAPENTIN 400 MG: 400 CAPSULE ORAL at 14:06

## 2021-10-15 RX ADMIN — METOPROLOL SUCCINATE 25 MG: 25 TABLET, EXTENDED RELEASE ORAL at 20:47

## 2021-10-15 RX ADMIN — Medication 1 TABLET: at 08:31

## 2021-10-15 RX ADMIN — THIAMINE HYDROCHLORIDE 100 MG: 100 INJECTION, SOLUTION INTRAMUSCULAR; INTRAVENOUS at 08:31

## 2021-10-15 RX ADMIN — DULOXETINE HYDROCHLORIDE 60 MG: 30 CAPSULE, DELAYED RELEASE ORAL at 20:47

## 2021-10-15 RX ADMIN — MIRTAZAPINE 30 MG: 15 TABLET, FILM COATED ORAL at 20:48

## 2021-10-15 ASSESSMENT — PAIN SCALES - GENERAL: PAINLEVEL_OUTOF10: 0

## 2021-10-15 NOTE — PLAN OF CARE
Problem: Falls - Risk of:  Goal: Will remain free from falls  Description: Will remain free from falls  Outcome: Met This Shift  Goal: Absence of physical injury  Description: Absence of physical injury  Outcome: Met This Shift     Problem: SAFETY  Goal: Free from accidental physical injury  Outcome: Met This Shift  Goal: Free from intentional harm  Outcome: Met This Shift     Problem: DAILY CARE  Goal: Daily care needs are met  Outcome: Met This Shift     Problem: PAIN  Goal: Patient's pain/discomfort is manageable  Outcome: Met This Shift     Problem: Skin Integrity:  Goal: Will show no infection signs and symptoms  Description: Will show no infection signs and symptoms  Outcome: Ongoing  Goal: Absence of new skin breakdown  Description: Absence of new skin breakdown  Outcome: Ongoing     Problem: SKIN INTEGRITY  Goal: Skin integrity is maintained or improved  Outcome: Ongoing     Problem: KNOWLEDGE DEFICIT  Goal: Patient/S.O. demonstrates understanding of disease process, treatment plan, medications, and discharge instructions.   Outcome: Ongoing     Problem: DISCHARGE BARRIERS  Goal: Patient's continuum of care needs are met  Outcome: Ongoing

## 2021-10-15 NOTE — PROGRESS NOTES
Occupational Therapy   Occupational Therapy Initial Assessment  Date: 10/15/2021   Patient Name: Karlo Randall  MRN: [id number]     : 1950    Date of Service: 10/15/2021    Discharge Recommendations:  Continue to assess pending progress       Assessment   Performance deficits / Impairments: Decreased functional mobility ; Decreased safe awareness;Decreased endurance;Decreased strength;Decreased ADL status; Decreased high-level IADLs;Decreased balance  Assessment: Pt agreeable to OT services. Pt transferred to sitting at EOB. Pt reports dizziness upon coming to sit upright. Pt come to stand with CGA x2. Pt unsteady and dizzy upon coming to stand. Pt unable to tolerate further activity to advance mobility secondary to dizziness at this time. Pt will benefit from skilled OT services while IP to improve independence and safety. Pt assisted to supine and left with seizure pads in place and bed alarm on and call light in reach. Prognosis: Good  Decision Making: Low Complexity  REQUIRES OT FOLLOW UP: Yes  Activity Tolerance  Activity Tolerance: Treatment limited secondary to dizziness and weakness. Patient Diagnosis(es): The encounter diagnosis was Intractable vomiting with nausea, unspecified vomiting type. has a past medical history of Bipolar affective ([address] Cholesterol blood decreased, Chronic back pain, COPD (chronic obstructive pulmonary disease) ([address] Depression, DJD (degenerative joint disease), Environmental allergies, GERD (gastroesophageal reflux disease), Hypertension, Insomnia, and Seizures ([address] has a past surgical history that includes Appendectomy; Cholecystectomy; back surgery; and Hand surgery (Right, ).            Restrictions  Restrictions/Precautions  Restrictions/Precautions: Seizure, General Precautions  Required Braces or Orthoses?: No    Subjective   General  Chart Reviewed: Yes  Patient assessed for rehabilitation services?: Yes  Family / Caregiver Present: No  Referring Practitioner: Aiden Rendon PA-C  Diagnosis: Alcohol withdrawal syndrome  Subjective  Subjective: Pt states he has been a daily whiskey drinker and wants to stop. Pt agreeable to OT services. Patient Currently in Pain: Yes (mild abdominal pain reported.)  Vital Signs  Patient Currently in Pain: Yes (mild abdominal pain reported.)  Social/Functional History  Social/Functional History  Lives With: Spouse  Type of Home: Trailer  Home Layout: One level  Home Access: Stairs to enter with rails  Entrance Stairs - Number of Steps: 3  Bathroom Shower/Tub: Tub/Shower unit  Bathroom Toilet: Standard  Bathroom Equipment: Shower chair, 3-in-1 commode, Grab bars in shower  Bathroom Accessibility: Walker accessible  ADL Assistance: Independent  Homemaking Responsibilities: No  Ambulation Assistance: Independent  Transfer Assistance: Independent  Active : Yes       Objective   Vision: Impaired  Vision Exceptions: Wears glasses for reading  Hearing: Exceptions to [facility]  Hearing Exceptions: Hard of hearing/hearing concerns; No hearing aid    Orientation  Overall Orientation Status: Within Functional Limits  Observation/Palpation  Observation: Pt lying in bed, 1.5L 02, seizure pads in place     ADL  LE Dressing: Supervision        Bed mobility  Supine to Sit: Stand by assistance  Sit to Supine: Stand by assistance  Scooting: Stand by assistance  Transfers  Sit to stand: Contact guard assistance     Cognition  Overall Cognitive Status: WFL                 LUE AROM (degrees)  LUE AROM : WFL  RUE AROM (degrees)  RUE AROM : WFL  LUE Strength  L Shoulder Flex: 4-/5  L Elbow Flex: 4-/5  L Elbow Ext: 4-/5  L Hand General: 4-/5  RUE Strength  R Shoulder Flex: 4-/5  R Elbow Flex: 4-/5  R Elbow Ext: 4-/5  R Hand General: 4-/5                   Plan   Plan  Times per week: 3-5  Times per day: Daily  Plan weeks: 1  Current Treatment Recommendations: Strengthening, Endurance Training, Balance Training, Functional Mobility Training, Safety Education & Training, Self-Care / ADL, Equipment Evaluation, Education, & procurement, Patient/Caregiver Education & Training    G-Code     OutComes Score                                                  AM-PAC Score             Goals  Short term goals  Time Frame for Short term goals: 1 week  Short term goal 1: Pt to complete toileting with MOD I. Short term goal 2: Pt to complete dressing with MOD I. Short term goal 3: Pt to complete bathing with MOD I. Short term goal 4: Pt to demonstrate independence with HEP  Short term goal 5: Pt to complete hygiene/grooming standing at sink with no LOB. Therapy Time   Individual Concurrent Group Co-treatment   Time In 1025         Time Out 1056         Minutes 31              This note serves as a DC summary in the event of pt discharge.      Pau Kaminski, OTR/L

## 2021-10-15 NOTE — ACP (ADVANCE CARE PLANNING)
Advance Care Planning     General Advance Care Planning (ACP) Conversation    Date of Conversation: 10/13/2021  Conducted with: Patient with Decision Making Capacity      Content/Action Overview:  Has NO ACP documents/care preferences - requested patient complete ACP documents  Reviewed DNR/DNI and patient elects Full Code (Attempt Resuscitation)    Length of Voluntary ACP Conversation in minutes:  <16 minutes (Non-Billable)    Ronna Pastor RN    Pt requests full code. Pt was left ACP packet for healthcare decision maker.   Pt was educated until that is completed, wife would be NOK and Darron Booker would be supplemental.

## 2021-10-15 NOTE — CARE COORDINATION
10/15/21 1212   Discharge Planning   [address] [employer] Spouse/Significant Other;Family Members   Current Services Prior To Admission [facility]   DME Bedside [facility]; [address] Medications No   DME   (per therapy recs)   Type of Home Care Services None   Patient expects to be discharged to: Trailer/mobile home   Expected Discharge Date 10/17/21   Follow Up Appointment: Best Day/Time    (any)     Lives with SO.   Has 3-in-1 commode, SC.  DME per therapy recs

## 2021-10-15 NOTE — PROGRESS NOTES
Pt complaint of double vision, states it has happened before and comes and goes. Notified provider see orders.

## 2021-10-15 NOTE — PROGRESS NOTES
Physical Therapy    Facility/Department: [facility] MED SURG  Initial Assessment    NAME: Rosalba Frausto  : 1950  MRN: [id number]    Date of Service: 10/15/2021    Discharge Recommendations:  Continue to assess pending progress        Assessment   Body structures, Functions, Activity limitations: Decreased functional mobility ; Decreased strength;Decreased high-level IADLs;Decreased endurance  Assessment: Alcohol withdrawal syndrome; functional decline, general weakness; unsteady gait; will benefit from PT to help restore safe, independent functional mobility. Treatment Diagnosis: Alcohol withdrawal syndrome; functional decline, general weakness; unsteady gait  Prognosis: Excellent  Decision Making: Low Complexity  REQUIRES PT FOLLOW UP: Yes  Activity Tolerance  Activity Tolerance: Patient Tolerated treatment well;Patient limited by endurance       Patient Diagnosis(es): The encounter diagnosis was Intractable vomiting with nausea, unspecified vomiting type. has a past medical history of Bipolar affective ([address] Cholesterol blood decreased, Chronic back pain, COPD (chronic obstructive pulmonary disease) ([address] Depression, DJD (degenerative joint disease), Environmental allergies, GERD (gastroesophageal reflux disease), Hypertension, Insomnia, and Seizures ([address] has a past surgical history that includes Appendectomy; Cholecystectomy; back surgery; and Hand surgery (Right, 2014).     Restrictions  Restrictions/Precautions  Restrictions/Precautions: Seizure, General Precautions  Required Braces or Orthoses?: No  Vision/Hearing        Subjective  General  Chart Reviewed: Yes  Patient assessed for rehabilitation services?: Yes  Response To Previous Treatment: Not applicable  Family / Caregiver Present: No  Referring Practitioner: Brandi Nelson MD  Diagnosis: Alcohol withdrawal syndrome  Follows Commands: Within Functional Limits  Subjective  Subjective: Patient agreeable to consult; feeling some better; normally is independent with mobility, denies using AD for ambulation.   Pain Screening  Patient Currently in Pain: Yes  Pain Assessment  Pain Assessment: 0-10  Vital Signs  Patient Currently in Pain: Yes       Orientation  Orientation  Overall Orientation Status: Within Functional Limits  Social/Functional History  Social/Functional History  Lives With: Spouse  Type of Home: Trailer  Home Layout: One level  Home Access: Stairs to enter with rails  Entrance Stairs - Number of Steps: 3  Bathroom Shower/Tub: Tub/Shower unit  Bathroom Toilet: Standard  Bathroom Equipment: Shower chair, 3-in-1 commode, Grab bars in shower  Bathroom Accessibility: Walker accessible  ADL Assistance: Independent  Homemaking Responsibilities: No  Ambulation Assistance: Independent  Transfer Assistance: Independent  Active : Yes  Cognition        Objective     Observation/Palpation  Posture: Fair  Observation: Pt lying in bed, 1.5L 02, seizure pads in place    AROM RLE (degrees)  RLE AROM: WFL  AROM LLE (degrees)  LLE AROM : WFL  AROM RUE (degrees)  RUE AROM : WFL  AROM LUE (degrees)  LUE AROM : WFL  Strength RLE  Strength RLE: WFL  Comment: MMG's grossly 4/5  Strength LLE  Strength LLE: WFL  Comment: MMG's grossly 4/5  Strength RUE  Strength RUE: WFL  Strength LUE  Strength LUE: WFL  Tone RLE  RLE Tone: Normotonic  Tone LLE  LLE Tone: Normotonic  Motor Control  Gross Motor?: WFL  Sensation  Overall Sensation Status: WFL  Bed mobility  Rolling to Right: Supervision  Supine to Sit: Stand by assistance  Sit to Supine: Stand by assistance  Scooting: Stand by assistance  Transfers  Sit to Stand: Contact guard assistance;Minimal Assistance  Stand to sit: Contact guard assistance;Minimal Assistance  Ambulation  Ambulation?: Yes  Ambulation 1  Surface: level tile  Device: Hand-Held Assist  Assistance: Minimal assistance;Contact guard assistance  Quality of Gait: unsteady  Gait Deviations: Staggers;Decreased step length;Decreased step height  Distance: 1 foot bedside - assisted with OT, PT     Balance  Posture: Fair  Sitting - Static: Good  Sitting - Dynamic: Good;-  Standing - Static: Fair  Standing - Dynamic: Poor;+        Plan   Plan  Times per week: 3-4 days  Plan weeks: 3-4 days  Current Treatment Recommendations: Strengthening, Neuromuscular Re-education, Home Exercise Program, Functional Mobility Training, Balance Training, Gait Training, Transfer Training, Safety Education & Training, Patient/Caregiver Education & Training             Goals  Long term goals  Time Frame for Long term goals : 3-4 days  Long term goal 1: Patient to achieve bed mobility with modified independence. Long term goal 2: Patient to achieve sit to stand and bed to chair transfers with SBA x 1. Long term goal 3: Patient to ambulate 30 feet with RW with CGA x 1 demonstrating good safety awareness.        Therapy Time   Individual Concurrent Group Co-treatment   Time In           Time Out           Minutes                   Sharon Walsh, PT

## 2021-10-15 NOTE — PROGRESS NOTES
Progress Note      Subjective:   Chief complaint:   Nausea  Stopped drinking whiskey 5 days prior to presentation  Poor po intake  Generalized weakness    Interval History:   Pt seen and examined this morning. Complains of double vision today. Denies any prior issues with vision. Denies headache or extremity weakness. Review of systems:   Constitutional:  Denies fever or chills. Positive for generalized weakness. Eyes:  Denies eye pain or redness. Positive for double vision. HENT:  Denies nasal congestion or sore throat   Respiratory:  Denies cough or shortness of breath   Cardiovascular:  Denies chest pain or edema   GI:  Denies abdominal pain, vomiting, bloody stools or diarrhea. Positive for nausea. :  Denies dysuria or frequency  Musculoskeletal:  Denies acute neck pain or body aches  Integument:  Denies rash or itching  Neurologic:  Denies headache, dizziness, numbness, tingling or unilateral weakness  Psychiatric:  Denies acute depression or acute anxiety    Past medical history, surgical history, family history and social history reviewed and unchanged compared to H&P earlier this admission.     Medications:   Scheduled Meds:   Vitamin D  2,000 Units Oral Daily    Vitamin B-12  2,500 mcg SubLINGual Daily    DULoxetine  60 mg Oral BID    gabapentin  400 mg Oral 4x Daily    mirtazapine  30 mg Oral Nightly    therapeutic multivitamin-minerals  1 tablet Oral Daily    QUEtiapine  300 mg Oral Nightly    sodium chloride  1,000 mL IntraVENous Once    sodium chloride flush  5-40 mL IntraVENous 2 times per day    thiamine  100 mg IntraVENous Daily    enoxaparin  40 mg SubCUTAneous Daily    metoprolol succinate  25 mg Oral Nightly    pantoprazole  40 mg Oral BID AC     Continuous Infusions:   sodium chloride      sodium chloride 100 mL/hr at 10/15/21 0318       Objective:     Vital Signs  Temp: 97.8 °F (36.6 °C)  Pulse: 105  Resp: 16  BP: 118/72  SpO2: 98 %  O2 Device: Nasal cannula  O2 Flow Rate (L/min): 1.5 L/min    Vital signs reviewed in electronic charts. Physical exam  Constitutional:  Well developed, well nourished, no acute distress  Eyes:  no scleral icterus, conjunctiva normal   HENT:  Atraumatic, external ears normal, nose normal, oropharynx moist, no pharyngeal exudates. Neck- supple, no JVD, no lymphadenopathy  Respiratory:  No respiratory distress, no wheezing, rales or rhonchi detected  Cardiovascular: Tachycardic, normal rhythm, no murmurs, no gallops, no rubs, no edema   GI:  Soft, nondistended, normal bowel sounds, nontender, no voluntary guarding  Musculoskeletal:  No cyanosis or obvious acute deformity. Moving all extremities   Integument:  Warm and dry. Lymphatic:  No cervical or axillary lymphadenopathy noted   Neurologic:  Alert & oriented x 3, no apparent focal deficits noted   Psychiatric:  Speech and behavior appropriate     Results:     Lab Results   Component Value Date    WBC 10.4 10/15/2021    HGB 12.9 (L) 10/15/2021    HCT 40.5 10/15/2021    .3 (H) 10/15/2021     10/15/2021       Lab Results   Component Value Date     10/15/2021    K 3.4 10/15/2021    CL 96 10/15/2021    CO2 26 10/15/2021    BUN 30 10/15/2021    CREATININE 0.9 10/15/2021    GLUCOSE 93 10/15/2021    CALCIUM 8.4 10/15/2021        Assessment and Plan:      Active Hospital Problems     Diagnosis Date Noted    Alcohol dependence ([facility] [F10.20]  -Counseled on cessation  -Peers  consulted    10/14/2021    Alcohol abuse [F10.10]  -Counseled on cessation  - consulted    10/14/2021    Bipolar disorder ([facility] [F31.9]  -Continue home duloxetine and seroquel    10/14/2021    Chronic back pain [M54.9, G89.29]  -Continue home muscle relaxer, gabapentin and as needed Tylenol    10/14/2021    HTN (hypertension) [I10]  -Continue home metoprolol    10/14/2021    Alcohol withdrawal syndrome, uncomplicated ([facility] [F94.003]  -Patient's last drink was 5 days prior to arrival.  Hypertensive and tachycardic on arrival.  No mental status changes. No current tremors. -CIWA scores low. Last score documented was 3.  -continue PRN Ativan per CIWA protocol  -continue MVI, thiamine and folate  -continue hydration with IVFs     Nausea  -Suspect secondary to acute alcohol withdrawal  -Continue as needed antiemetics and IV fluids     Leukocytosis  -wbc 16.2 on admission; now wnl at 10.4. Suspect leukocytosis was reactive.    -Infectious work-up negative for any acute etiology.  -resolved     Hypokalemia  -Replace  -Monitor and replete as needed     Acute renal failure  -Serum creatinine 1.5 on 10/13; 1.2 on 10/14; 0.9 on 10/15 with IVFs. -Suspect MAXWELL secondary to poor p.o. intake from nausea in setting of acute alcohol withdrawal  -per RN report, pt still not eating/drinking much. Will continue IVFs at decreased rate of 50ml/hr.  -Avoid nephrotoxins and NSAIDs  -resolved    Double vision  -unclear etiology  -check CT head. If negative, will likely need OP referral to ophthalmology 10/13/2021          Patient was seen and examined by Dr. Omero Martinez and plan of care reviewed.       Electronically signed by NGA Rogers on 10/15/2021 at 1:19 PM

## 2021-10-15 NOTE — FLOWSHEET NOTE
10/15/21 0800   Assessment   Charting Type Shift assessment   Neurological   Neuro (WDL) WDL   Level of Consciousness Alert (0)   Scott Coma Scale   Eye Opening 4   Best Verbal Response 5   Best Motor Response 6   Scott Coma Scale Score 15   HEENT   HEENT (WDL) X   Teeth Other (Comment)  (poor dentition)   Respiratory   Respiratory (WDL) WDL   Respiratory Pattern Regular   Respiratory Depth Normal   Respiratory Quality/Effort Unlabored   Breath Sounds   Right Upper Lobe Clear   Right Middle Lobe Diminished   Right Lower Lobe Diminished   Left Upper Lobe Clear   Left Lower Lobe Diminished   Cardiac   Cardiac (WDL) X   Cardiac Regularity Regular   Cardiac Rhythm Sinus tachy;Sinus rhythm   Cardiac Monitor   Telemetry Monitor On Yes   Telemetry Audible Yes   Telemetry Alarms Set Yes   Telemetry Box Number [id number]   Telemetry Monitor Alarm Parameters    Gastrointestinal   Abdominal (WDL) WDL   Peripheral Vascular   Peripheral Vascular (WDL) WDL   Skin Color/Condition   Skin Color/Condition (WDL) WDL   Skin Integrity   Skin Integrity (WDL) X   Skin Integrity Bruising; Other (Comment)  (scabs)   Location scattered   Musculoskeletal   Musculoskeletal (WDL) X   RUE Weakness   LUE Weakness   RL Extremity Weakness   LL Extremity Weakness   Genitourinary   Genitourinary (WDL) X   Urine Assessment   Urine Color Estelita   Urine Appearance Cloudy   Urine Odor Malodorous   Psychosocial   Psychosocial (WDL) WDL

## 2021-10-16 VITALS
WEIGHT: 150 LBS | HEIGHT: 69 IN | HEART RATE: 97 BPM | OXYGEN SATURATION: 94 % | TEMPERATURE: 97.5 F | DIASTOLIC BLOOD PRESSURE: 62 MMHG | SYSTOLIC BLOOD PRESSURE: 92 MMHG | BODY MASS INDEX: 22.22 KG/M2 | RESPIRATION RATE: 18 BRPM

## 2021-10-16 LAB
A/G RATIO: 1 (ref 0.8–2)
ALBUMIN SERPL-MCNC: 2.9 G/DL (ref 3.4–4.8)
ALP BLD-CCNC: 35 U/L (ref 25–100)
ALT SERPL-CCNC: 20 U/L (ref 4–36)
ANION GAP SERPL CALCULATED.3IONS-SCNC: 9 MMOL/L (ref 3–16)
AST SERPL-CCNC: 24 U/L (ref 8–33)
BILIRUB SERPL-MCNC: 0.6 MG/DL (ref 0.3–1.2)
BUN BLDV-MCNC: 18 MG/DL (ref 6–20)
CALCIUM SERPL-MCNC: 8.8 MG/DL (ref 8.5–10.5)
CHLORIDE BLD-SCNC: 99 MMOL/L (ref 98–107)
CO2: 26 MMOL/L (ref 20–30)
CREAT SERPL-MCNC: 0.7 MG/DL (ref 0.4–1.2)
GFR AFRICAN AMERICAN: >59
GFR NON-AFRICAN AMERICAN: >59
GLOBULIN: 2.9 G/DL
GLUCOSE BLD-MCNC: 91 MG/DL (ref 74–106)
GLUCOSE BLD-MCNC: 95 MG/DL (ref 74–106)
HCT VFR BLD CALC: 34.5 % (ref 40–54)
HEMOGLOBIN: 11 G/DL (ref 13–18)
MAGNESIUM: 2 MG/DL (ref 1.7–2.4)
MCH RBC QN AUTO: 33.2 PG (ref 27–32)
MCHC RBC AUTO-ENTMCNC: 31.9 G/DL (ref 31–35)
MCV RBC AUTO: 104.2 FL (ref 80–100)
PDW BLD-RTO: 15.9 % (ref 11–16)
PERFORMED ON: NORMAL
PLATELET # BLD: 172 K/UL (ref 150–400)
PMV BLD AUTO: 10.9 FL (ref 6–10)
POTASSIUM REFLEX MAGNESIUM: 3.4 MMOL/L (ref 3.4–5.1)
RBC # BLD: 3.31 M/UL (ref 4.5–6)
SODIUM BLD-SCNC: 134 MMOL/L (ref 136–145)
TOTAL PROTEIN: 5.8 G/DL (ref 6.4–8.3)
WBC # BLD: 8.3 K/UL (ref 4–11)

## 2021-10-16 PROCEDURE — 6360000002 HC RX W HCPCS: Performed by: PHYSICIAN ASSISTANT

## 2021-10-16 PROCEDURE — 83735 ASSAY OF MAGNESIUM: CPT

## 2021-10-16 PROCEDURE — 6370000000 HC RX 637 (ALT 250 FOR IP): Performed by: PHYSICIAN ASSISTANT

## 2021-10-16 PROCEDURE — 99238 HOSP IP/OBS DSCHRG MGMT 30/<: CPT | Performed by: INTERNAL MEDICINE

## 2021-10-16 PROCEDURE — 80053 COMPREHEN METABOLIC PANEL: CPT

## 2021-10-16 PROCEDURE — 85027 COMPLETE CBC AUTOMATED: CPT

## 2021-10-16 PROCEDURE — 36415 COLL VENOUS BLD VENIPUNCTURE: CPT

## 2021-10-16 PROCEDURE — 2700000000 HC OXYGEN THERAPY PER DAY

## 2021-10-16 PROCEDURE — 2580000003 HC RX 258: Performed by: PHYSICIAN ASSISTANT

## 2021-10-16 RX ORDER — M-VIT,TX,IRON,MINS/CALC/FOLIC 27MG-0.4MG
1 TABLET ORAL DAILY
Qty: 30 TABLET | Refills: 0 | Status: SHIPPED | OUTPATIENT
Start: 2021-10-17 | End: 2022-07-30

## 2021-10-16 RX ORDER — CEFDINIR 300 MG/1
300 CAPSULE ORAL 2 TIMES DAILY
Qty: 10 CAPSULE | Refills: 0 | Status: SHIPPED | OUTPATIENT
Start: 2021-10-16 | End: 2021-10-21

## 2021-10-16 RX ORDER — CALCIUM CARBONATE 200(500)MG
500 TABLET,CHEWABLE ORAL 3 TIMES DAILY PRN
Status: DISCONTINUED | OUTPATIENT
Start: 2021-10-16 | End: 2021-10-16 | Stop reason: HOSPADM

## 2021-10-16 RX ORDER — THIAMINE HYDROCHLORIDE 100 MG/ML
100 INJECTION, SOLUTION INTRAMUSCULAR; INTRAVENOUS DAILY
Qty: 1 EACH | Refills: 0 | Status: SHIPPED | OUTPATIENT
Start: 2021-10-17 | End: 2022-07-30

## 2021-10-16 RX ORDER — FOLIC ACID 1 MG/1
1 TABLET ORAL DAILY
Qty: 30 TABLET | Refills: 0 | Status: SHIPPED | OUTPATIENT
Start: 2021-10-16 | End: 2022-07-30

## 2021-10-16 RX ADMIN — GABAPENTIN 400 MG: 400 CAPSULE ORAL at 08:47

## 2021-10-16 RX ADMIN — ENOXAPARIN SODIUM 40 MG: 40 INJECTION SUBCUTANEOUS at 08:46

## 2021-10-16 RX ADMIN — PANTOPRAZOLE SODIUM 40 MG: 40 TABLET, DELAYED RELEASE ORAL at 06:22

## 2021-10-16 RX ADMIN — ANTACID TABLETS 500 MG: 500 TABLET, CHEWABLE ORAL at 09:57

## 2021-10-16 RX ADMIN — Medication 1 TABLET: at 08:46

## 2021-10-16 RX ADMIN — THIAMINE HYDROCHLORIDE 100 MG: 100 INJECTION, SOLUTION INTRAMUSCULAR; INTRAVENOUS at 08:46

## 2021-10-16 RX ADMIN — DULOXETINE HYDROCHLORIDE 60 MG: 30 CAPSULE, DELAYED RELEASE ORAL at 08:46

## 2021-10-16 RX ADMIN — Medication 2000 UNITS: at 08:47

## 2021-10-16 RX ADMIN — SODIUM CHLORIDE: 9 INJECTION, SOLUTION INTRAVENOUS at 08:45

## 2021-10-16 RX ADMIN — Medication 2500 MCG: at 08:46

## 2021-10-16 NOTE — PLAN OF CARE
Problem: Falls - Risk of:  Goal: Will remain free from falls  Description: Will remain free from falls  Outcome: Ongoing     Problem: Skin Integrity:  Goal: Will show no infection signs and symptoms  Description: Will show no infection signs and symptoms  Outcome: Ongoing     Problem: DAILY CARE  Goal: Daily care needs are met  Outcome: Met This Shift

## 2021-10-16 NOTE — DISCHARGE SUMMARY
Discharge Summary      Patient ID: Shannon Shannon      Patient's PCP: DYLAN Lee    Admit Date: 10/13/2021     Discharge Date:  10/16/2021    Admitting Provider: Zak Moon MD    Discharging Provider: NGA Johnson     Reason for this admission:   Acute alcohol withdrawal    Discharge Diagnoses: Active Hospital Problems    Diagnosis Date Noted    Double vision [H53.2]     Alcohol dependence ([address] [F10.20] 10/14/2021    Alcohol abuse [F10.10] 10/14/2021    Bipolar disorder ([address] [F31.9] 10/14/2021    Chronic back pain [M54.9, G89.29] 10/14/2021    HTN (hypertension) [I10] 10/14/2021    Nausea [R11.0] 10/14/2021    Leukocytosis [D72.829] 10/14/2021    Hypokalemia [E87.6] 10/14/2021    Acute renal failure (ARF) ([address] [N17.9] 10/14/2021    Alcohol withdrawal syndrome, uncomplicated ([address] [H01.949] 10/13/2021       Procedures:  CT HEAD WO CONTRAST   Final Result      1. No intracranial hemorrhage, mass effect or large acute territorial infarction   2. Stable mild to moderate chronic small vessel ischemia      XR CHEST PORTABLE   Final Result      No acute pulmonary disease. CT ABDOMEN PELVIS WO CONTRAST Additional Contrast? None   Final Result   Impression:   1. No CT evidence of acute pancreatitis. 2. Diffuse hepatic steatosis. 3. Hiatal hernia. 4. Colonic diverticulosis. 5. 2 mm right renal calculus of doubtful significance. Consults:   IP CONSULT TO CASE MANAGEMENT  PT OT      Briefly:   73 y.o. male with PMH of ETOH abuse and dependence, bipolar disorder, HLD, chronic back pain, COPD, depression, GERD, HTN, insomnia and ?seizures admitted for acute alcohol withdrawal.      Hospital Course:       Active Hospital Problems     Diagnosis Date Noted    Alcohol dependence ([address] [F10.20]  -Counseled on cessation  -Peers  consulted    10/14/2021    Alcohol abuse [F10.10]  -Counseled on cessation  - consulted    10/14/2021    Bipolar disorder ([facility] [address] [F31.9]  -Continue home duloxetine and seroquel    10/14/2021    Chronic back pain [M54.9, G89.29]  -Continue home muscle relaxer, gabapentin and as needed Tylenol    10/14/2021    HTN (hypertension) [I10]  -Continue home metoprolol    10/14/2021    Alcohol withdrawal syndrome, uncomplicated (HCC) [Y13.348]  -Patient's last drink was 5 days prior to arrival.  Hypertensive and tachycardic on arrival.  No mental status changes. No current tremors. -CIWA scores low. Last score documented was 3.  -treated with CIWA protocol, MVI, thiamine, folate and IVFs     Nausea  -Suspect secondary to acute alcohol withdrawal  -treated with PRN antiemetics and IV fluids  -resolved; now tolerating regular diet and ice cream     Leukocytosis  -wbc 16.2 on admission; now wnl at 10.4. Suspect leukocytosis was reactive.    -Infectious work-up remarkable for slight UTI.     Hypokalemia  -Replaced  -Monitor and replete as needed     Acute renal failure  -Serum creatinine 1.5 on 10/13; 1.2 on 10/14; 0.9 on 10/15 with IVFs. -Suspect MAXWELL secondary to poor p.o. intake from nausea in setting of acute alcohol withdrawal  -per RN report, pt still not eating/drinking much. Will continue IVFs at decreased rate of 50ml/hr.  -Avoid nephrotoxins and NSAIDs  -resolved     Double vision  -unclear etiology  -CT head negative  -refer to OP ophthalmology    UTI  -UA c/w UTI  -treat with 5 days of omnicef on dc            Disposition: home    Discharged Condition: Stable    Vital Signs  Temp: 97.5 °F (36.4 °C)  Pulse: 97  Resp: 18  BP: 92/62  SpO2: 94 %  O2 Device: Nasal cannula  O2 Flow Rate (L/min): 1.5 L/min    Vital signs reviewed in electronic chart. Physical exam  Constitutional:  Well developed, well nourished, no acute distress  Eyes:  no scleral icterus, conjunctiva normal   HENT:  Atraumatic, external ears normal, nose normal, oropharynx moist, no pharyngeal exudates.  Neck- supple, no JVD, no lymphadenopathy  Respiratory:  No respiratory distress, no wheezing, rales or rhonchi detected  Cardiovascular: Tachycardic, normal rhythm, no murmurs, no gallops, no rubs, no edema   GI:  Soft, nondistended, normal bowel sounds, nontender, no voluntary guarding  Musculoskeletal:  No cyanosis or obvious acute deformity. Moving all extremities   Integument:  Warm and dry. Lymphatic:  No cervical or axillary lymphadenopathy noted   Neurologic:  Alert & oriented x 3, no apparent focal deficits noted   Psychiatric:  Speech and behavior appropriate       Activity: activity as tolerated  Diet: regular diet  Follow Up: Primary Care Physician in 2 weeks and Ophthalmology in 1 week    Labs: For convenience and continuity at follow-up the following most recent labs are provided:    CBC:   Lab Results   Component Value Date    WBC 8.3 10/16/2021    HGB 11.0 10/16/2021    HCT 34.5 10/16/2021     10/16/2021       RENAL:   Lab Results   Component Value Date     10/16/2021    K 3.4 10/16/2021    CL 99 10/16/2021    CO2 26 10/16/2021    BUN 18 10/16/2021    CREATININE 0.7 10/16/2021         Discharge Medications:     Current Discharge Medication List           Details   thiamine (B-1) 100 MG/ML injection Infuse 1 mL intravenously daily  Qty: 1 each, Refills: 0      folic acid (FOLVITE) 1 MG tablet Take 1 tablet by mouth daily  Qty: 30 tablet, Refills: 0      cefdinir (OMNICEF) 300 MG capsule Take 1 capsule by mouth 2 times daily for 5 days  Qty: 10 capsule, Refills: 0              Details   Multiple Vitamins-Minerals (THERAPEUTIC MULTIVITAMIN-MINERALS) tablet Take 1 tablet by mouth daily  Qty: 30 tablet, Refills: 0              Details   gabapentin (NEURONTIN) 400 MG capsule Take 1 capsule by mouth 4 times daily for 30 days.   Qty: 120 capsule, Refills: 2    Associated Diagnoses: Chronic neck pain; Chronic back pain, unspecified back location, unspecified back pain laterality      QUEtiapine (SEROQUEL) 300 MG tablet Take 1 tablet by mouth nightly  Qty: 30 tablet, Refills: 5      metoprolol succinate (TOPROL XL) 25 MG extended release tablet Take 1 tablet by mouth nightly  Qty: 30 tablet, Refills: 3      omeprazole (PRILOSEC) 20 MG delayed release capsule Take 1 capsule by mouth Daily  Qty: 30 capsule, Refills: 5      mirtazapine (REMERON) 30 MG tablet Take 30 mg by mouth nightly      methocarbamol (ROBAXIN) 750 MG tablet Take 1 tablet by mouth 3 times daily as needed (muscle spasms)  Qty: 90 tablet, Refills: 5      cetirizine (ZYRTEC) 10 MG tablet Take 1 tablet by mouth daily as needed for Allergies  Qty: 30 tablet, Refills: 5      Cholecalciferol (VITAMIN D3) 50 MCG (2000 UT) CAPS Take 1 capsule by mouth daily  Qty: 30 capsule, Refills: 5      DULoxetine (CYMBALTA) 60 MG extended release capsule Take 1 capsule by mouth 2 times daily  Qty: 60 capsule, Refills: 5      fluticasone (FLONASE) 50 MCG/ACT nasal spray 1-2 sprays by Nasal route daily as needed for Rhinitis  Qty: 1 Bottle, Refills: 5              Patient was seen and examined by Dr. Karen Goode and plan of care reviewed. Signed:  Electronically signed by NGA Carbajal on 10/16/2021 at 11:12 AM       Thank you DYLAN Gusman for the opportunity to be involved in this patient's care. If you have any questions or concerns please feel free to contact me at [phone].

## 2021-10-16 NOTE — FLOWSHEET NOTE
10/15/21 2047   Assessment   Charting Type Shift assessment   Neurological   Neuro (WDL) WDL   Level of Consciousness Alert (0)   Scott Coma Scale   Eye Opening 4   Best Verbal Response 5   Best Motor Response 6   Scott Coma Scale Score 15   HEENT   HEENT (WDL) X   Teeth Other (Comment)  (poor dentition)   Respiratory   Respiratory (WDL) WDL   Respiratory Pattern Regular   Respiratory Depth Normal   Respiratory Quality/Effort Unlabored   Breath Sounds   Right Upper Lobe Clear   Right Middle Lobe Diminished   Right Lower Lobe Diminished   Left Upper Lobe Clear   Left Lower Lobe Diminished   Cardiac   Cardiac (WDL) X   Cardiac Regularity Regular   Cardiac Rhythm Sinus tachy;Sinus rhythm   Cardiac Monitor   Telemetry Monitor On Yes   Telemetry Audible Yes   Telemetry Alarms Set Yes   Telemetry Box Number [id number]   Telemetry Monitor Alarm Parameters    Gastrointestinal   Abdominal (WDL) WDL   Peripheral Vascular   Peripheral Vascular (WDL) WDL   Skin Color/Condition   Skin Color/Condition (WDL) WDL   Skin Integrity   Skin Integrity (WDL) X   Skin Integrity Bruising; Other (Comment)  (scabs)   Location scattered   Musculoskeletal   Musculoskeletal (WDL) X   RUE Weakness   LUE Weakness   RL Extremity Weakness   LL Extremity Weakness   Genitourinary   Genitourinary (WDL) X   Psychosocial   Psychosocial (WDL) WDL

## 2021-10-17 LAB — URINE CULTURE, ROUTINE: NORMAL

## 2022-07-30 ENCOUNTER — HOSPITAL ENCOUNTER (OUTPATIENT)
Facility: HOSPITAL | Age: 72
Setting detail: OBSERVATION
Discharge: HOME | End: 2022-07-30
Attending: FAMILY MEDICINE | Admitting: INTERNAL MEDICINE
Payer: MEDICARE

## 2022-07-30 ENCOUNTER — APPOINTMENT (OUTPATIENT)
Dept: CT IMAGING | Facility: HOSPITAL | Age: 72
End: 2022-07-30
Payer: MEDICARE

## 2022-07-30 VITALS
TEMPERATURE: 97.7 F | SYSTOLIC BLOOD PRESSURE: 133 MMHG | HEART RATE: 80 BPM | OXYGEN SATURATION: 95 % | RESPIRATION RATE: 18 BRPM | DIASTOLIC BLOOD PRESSURE: 89 MMHG | BODY MASS INDEX: 26.58 KG/M2 | WEIGHT: 180 LBS

## 2022-07-30 DIAGNOSIS — R41.82 ALTERED MENTAL STATUS, UNSPECIFIED ALTERED MENTAL STATUS TYPE: ICD-10-CM

## 2022-07-30 DIAGNOSIS — R56.9 SEIZURES (HCC): ICD-10-CM

## 2022-07-30 DIAGNOSIS — R56.9 SEIZURE (HCC): Primary | ICD-10-CM

## 2022-07-30 PROBLEM — F10.21 HISTORY OF ALCOHOL DEPENDENCE (HCC): Status: ACTIVE | Noted: 2022-07-30

## 2022-07-30 LAB
A/G RATIO: 1.3 (ref 0.8–2)
A/G RATIO: 1.4 (ref 0.8–2)
ALBUMIN SERPL-MCNC: 4 G/DL (ref 3.4–4.8)
ALBUMIN SERPL-MCNC: 4.4 G/DL (ref 3.4–4.8)
ALP BLD-CCNC: 31 U/L (ref 25–100)
ALP BLD-CCNC: 36 U/L (ref 25–100)
ALT SERPL-CCNC: 10 U/L (ref 4–36)
ALT SERPL-CCNC: 12 U/L (ref 4–36)
AMORPHOUS: ABNORMAL /HPF
AMPHETAMINE SCREEN, URINE: NORMAL
ANION GAP SERPL CALCULATED.3IONS-SCNC: 11 MMOL/L (ref 3–16)
ANION GAP SERPL CALCULATED.3IONS-SCNC: 30 MMOL/L (ref 3–16)
APTT: 25.3 SEC (ref 22.5–34.9)
AST SERPL-CCNC: 14 U/L (ref 8–33)
AST SERPL-CCNC: 20 U/L (ref 8–33)
BACTERIA: ABNORMAL /HPF
BARBITURATE SCREEN URINE: NORMAL
BASOPHILS ABSOLUTE: 0.1 K/UL (ref 0–0.1)
BASOPHILS ABSOLUTE: 0.1 K/UL (ref 0–0.1)
BASOPHILS RELATIVE PERCENT: 0.4 %
BASOPHILS RELATIVE PERCENT: 0.6 %
BENZODIAZEPINE SCREEN, URINE: NORMAL
BILIRUB SERPL-MCNC: 0.3 MG/DL (ref 0.3–1.2)
BILIRUB SERPL-MCNC: <0.2 MG/DL (ref 0.3–1.2)
BILIRUBIN URINE: NEGATIVE
BLOOD, URINE: NEGATIVE
BUN BLDV-MCNC: 10 MG/DL (ref 6–20)
BUN BLDV-MCNC: 9 MG/DL (ref 6–20)
BUPRENORPHINE QUAL, URINE: NORMAL
CALCIUM SERPL-MCNC: 10 MG/DL (ref 8.5–10.5)
CALCIUM SERPL-MCNC: 9.2 MG/DL (ref 8.5–10.5)
CANNABINOID SCREEN URINE: NORMAL
CHLORIDE BLD-SCNC: 103 MMOL/L (ref 98–107)
CHLORIDE BLD-SCNC: 98 MMOL/L (ref 98–107)
CLARITY: CLEAR
CO2: 17 MMOL/L (ref 20–30)
CO2: 29 MMOL/L (ref 20–30)
COCAINE METABOLITE SCREEN URINE: NORMAL
COLOR: YELLOW
CREAT SERPL-MCNC: 1.1 MG/DL (ref 0.4–1.2)
CREAT SERPL-MCNC: 1.1 MG/DL (ref 0.4–1.2)
EOSINOPHILS ABSOLUTE: 0.2 K/UL (ref 0–0.4)
EOSINOPHILS ABSOLUTE: 0.6 K/UL (ref 0–0.4)
EOSINOPHILS RELATIVE PERCENT: 1.1 %
EOSINOPHILS RELATIVE PERCENT: 3.8 %
ETHANOL: NORMAL MG/DL (ref 0–0.08)
FOLATE: 11.47 NG/ML
GFR AFRICAN AMERICAN: >59
GFR AFRICAN AMERICAN: >59
GFR NON-AFRICAN AMERICAN: >59
GFR NON-AFRICAN AMERICAN: >59
GLOBULIN: 3 G/DL
GLOBULIN: 3.2 G/DL
GLUCOSE BLD-MCNC: 152 MG/DL (ref 74–106)
GLUCOSE BLD-MCNC: 157 MG/DL (ref 74–106)
GLUCOSE BLD-MCNC: 97 MG/DL (ref 74–106)
GLUCOSE URINE: NEGATIVE MG/DL
HCT VFR BLD CALC: 41.1 % (ref 40–54)
HCT VFR BLD CALC: 46.9 % (ref 40–54)
HEMOGLOBIN: 12.9 G/DL (ref 13–18)
HEMOGLOBIN: 14.3 G/DL (ref 13–18)
IMMATURE GRANULOCYTES #: 0.1 K/UL
IMMATURE GRANULOCYTES #: 0.1 K/UL
IMMATURE GRANULOCYTES %: 0.5 % (ref 0–5)
IMMATURE GRANULOCYTES %: 0.5 % (ref 0–5)
INR BLD: 0.95 (ref 0.87–1.11)
KETONES, URINE: NEGATIVE MG/DL
LEUKOCYTE ESTERASE, URINE: NEGATIVE
LYMPHOCYTES ABSOLUTE: 1.8 K/UL (ref 1.5–4)
LYMPHOCYTES ABSOLUTE: 3.8 K/UL (ref 1.5–4)
LYMPHOCYTES RELATIVE PERCENT: 13.5 %
LYMPHOCYTES RELATIVE PERCENT: 24.1 %
Lab: NORMAL
MCH RBC QN AUTO: 30.4 PG (ref 27–32)
MCH RBC QN AUTO: 30.5 PG (ref 27–32)
MCHC RBC AUTO-ENTMCNC: 30.5 G/DL (ref 31–35)
MCHC RBC AUTO-ENTMCNC: 31.4 G/DL (ref 31–35)
MCV RBC AUTO: 97.2 FL (ref 80–100)
MCV RBC AUTO: 99.6 FL (ref 80–100)
METHADONE SCREEN, URINE: NORMAL
METHAMPHETAMINE, URINE: NORMAL
MICROSCOPIC EXAMINATION: YES
MONOCYTES ABSOLUTE: 1.3 K/UL (ref 0.2–0.8)
MONOCYTES ABSOLUTE: 1.7 K/UL (ref 0.2–0.8)
MONOCYTES RELATIVE PERCENT: 10.7 %
MONOCYTES RELATIVE PERCENT: 9.7 %
NEUTROPHILS ABSOLUTE: 10 K/UL (ref 2–7.5)
NEUTROPHILS ABSOLUTE: 9.6 K/UL (ref 2–7.5)
NEUTROPHILS RELATIVE PERCENT: 60.3 %
NEUTROPHILS RELATIVE PERCENT: 74.8 %
NITRITE, URINE: NEGATIVE
OPIATE SCREEN URINE: NORMAL
PDW BLD-RTO: 16.2 % (ref 11–16)
PDW BLD-RTO: 16.6 % (ref 11–16)
PERFORMED ON: ABNORMAL
PH UA: 7.5 (ref 5–8)
PHENCYCLIDINE SCREEN URINE: NORMAL
PLATELET # BLD: 307 K/UL (ref 150–400)
PLATELET # BLD: 340 K/UL (ref 150–400)
PMV BLD AUTO: 10.2 FL (ref 6–10)
PMV BLD AUTO: 11.1 FL (ref 6–10)
POTASSIUM REFLEX MAGNESIUM: 3.8 MMOL/L (ref 3.4–5.1)
POTASSIUM SERPL-SCNC: 4.3 MMOL/L (ref 3.4–5.1)
PROPOXYPHENE SCREEN, URINE: NORMAL
PROTEIN UA: 100 MG/DL
PROTHROMBIN TIME: 12.7 SEC (ref 12–14.4)
RBC # BLD: 4.23 M/UL (ref 4.5–6)
RBC # BLD: 4.71 M/UL (ref 4.5–6)
RBC UA: ABNORMAL /HPF (ref 0–4)
SARS-COV-2, NAAT: NOT DETECTED
SODIUM BLD-SCNC: 143 MMOL/L (ref 136–145)
SODIUM BLD-SCNC: 145 MMOL/L (ref 136–145)
SPECIFIC GRAVITY UA: 1.02 (ref 1–1.03)
TOTAL CK: 44 U/L (ref 26–174)
TOTAL PROTEIN: 7 G/DL (ref 6.4–8.3)
TOTAL PROTEIN: 7.6 G/DL (ref 6.4–8.3)
TRICYCLIC, URINE: NORMAL
TROPONIN: <0.3 NG/ML
UR OXYCODONE RAPID SCREEN: NORMAL
URINE REFLEX TO CULTURE: ABNORMAL
URINE TYPE: ABNORMAL
UROBILINOGEN, URINE: 0.2 E.U./DL
VITAMIN B-12: 510 PG/ML (ref 211–911)
WBC # BLD: 13.3 K/UL (ref 4–11)
WBC # BLD: 15.9 K/UL (ref 4–11)
WBC UA: ABNORMAL /HPF (ref 0–5)

## 2022-07-30 PROCEDURE — 81001 URINALYSIS AUTO W/SCOPE: CPT

## 2022-07-30 PROCEDURE — 82746 ASSAY OF FOLIC ACID SERUM: CPT

## 2022-07-30 PROCEDURE — 96374 THER/PROPH/DIAG INJ IV PUSH: CPT

## 2022-07-30 PROCEDURE — 6370000000 HC RX 637 (ALT 250 FOR IP): Performed by: PHYSICIAN ASSISTANT

## 2022-07-30 PROCEDURE — 82607 VITAMIN B-12: CPT

## 2022-07-30 PROCEDURE — 93005 ELECTROCARDIOGRAM TRACING: CPT

## 2022-07-30 PROCEDURE — 82550 ASSAY OF CK (CPK): CPT

## 2022-07-30 PROCEDURE — 80307 DRUG TEST PRSMV CHEM ANLYZR: CPT

## 2022-07-30 PROCEDURE — 80053 COMPREHEN METABOLIC PANEL: CPT

## 2022-07-30 PROCEDURE — 85730 THROMBOPLASTIN TIME PARTIAL: CPT

## 2022-07-30 PROCEDURE — 6360000002 HC RX W HCPCS

## 2022-07-30 PROCEDURE — G0378 HOSPITAL OBSERVATION PER HR: HCPCS

## 2022-07-30 PROCEDURE — 6370000000 HC RX 637 (ALT 250 FOR IP): Performed by: INTERNAL MEDICINE

## 2022-07-30 PROCEDURE — 84484 ASSAY OF TROPONIN QUANT: CPT

## 2022-07-30 PROCEDURE — 87635 SARS-COV-2 COVID-19 AMP PRB: CPT

## 2022-07-30 PROCEDURE — 99285 EMERGENCY DEPT VISIT HI MDM: CPT

## 2022-07-30 PROCEDURE — 85025 COMPLETE CBC W/AUTO DIFF WBC: CPT

## 2022-07-30 PROCEDURE — 36415 COLL VENOUS BLD VENIPUNCTURE: CPT

## 2022-07-30 PROCEDURE — 85610 PROTHROMBIN TIME: CPT

## 2022-07-30 PROCEDURE — 82077 ASSAY SPEC XCP UR&BREATH IA: CPT

## 2022-07-30 PROCEDURE — 70450 CT HEAD/BRAIN W/O DYE: CPT

## 2022-07-30 PROCEDURE — 99235 HOSP IP/OBS SAME DATE MOD 70: CPT | Performed by: INTERNAL MEDICINE

## 2022-07-30 PROCEDURE — 96375 TX/PRO/DX INJ NEW DRUG ADDON: CPT

## 2022-07-30 RX ORDER — GAUZE BANDAGE 2" X 2"
100 BANDAGE TOPICAL DAILY
Status: DISCONTINUED | OUTPATIENT
Start: 2022-07-30 | End: 2022-07-30 | Stop reason: HOSPADM

## 2022-07-30 RX ORDER — DULOXETIN HYDROCHLORIDE 30 MG/1
60 CAPSULE, DELAYED RELEASE ORAL 2 TIMES DAILY
Status: DISCONTINUED | OUTPATIENT
Start: 2022-07-30 | End: 2022-07-30 | Stop reason: HOSPADM

## 2022-07-30 RX ORDER — LEVETIRACETAM 500 MG/1
500 TABLET ORAL 2 TIMES DAILY
Status: DISCONTINUED | OUTPATIENT
Start: 2022-07-30 | End: 2022-07-30 | Stop reason: HOSPADM

## 2022-07-30 RX ORDER — QUETIAPINE FUMARATE 100 MG/1
300 TABLET, FILM COATED ORAL NIGHTLY
Status: DISCONTINUED | OUTPATIENT
Start: 2022-07-30 | End: 2022-07-30 | Stop reason: HOSPADM

## 2022-07-30 RX ORDER — LEVETIRACETAM 10 MG/ML
INJECTION INTRAVASCULAR
Status: COMPLETED
Start: 2022-07-30 | End: 2022-07-30

## 2022-07-30 RX ORDER — ACETAMINOPHEN 325 MG/1
650 TABLET ORAL EVERY 4 HOURS PRN
Status: DISCONTINUED | OUTPATIENT
Start: 2022-07-30 | End: 2022-07-30 | Stop reason: HOSPADM

## 2022-07-30 RX ORDER — LEVETIRACETAM 500 MG/1
500 TABLET ORAL 2 TIMES DAILY
Qty: 60 TABLET | Refills: 0 | Status: SHIPPED | OUTPATIENT
Start: 2022-07-30

## 2022-07-30 RX ORDER — MIRTAZAPINE 15 MG/1
30 TABLET, FILM COATED ORAL NIGHTLY
Status: DISCONTINUED | OUTPATIENT
Start: 2022-07-30 | End: 2022-07-30 | Stop reason: HOSPADM

## 2022-07-30 RX ORDER — IPRATROPIUM BROMIDE AND ALBUTEROL SULFATE 2.5; .5 MG/3ML; MG/3ML
1 SOLUTION RESPIRATORY (INHALATION)
Status: DISCONTINUED | OUTPATIENT
Start: 2022-07-30 | End: 2022-07-30

## 2022-07-30 RX ORDER — PANTOPRAZOLE SODIUM 40 MG/1
40 TABLET, DELAYED RELEASE ORAL
Refills: 5 | Status: DISCONTINUED | OUTPATIENT
Start: 2022-07-30 | End: 2022-07-30 | Stop reason: HOSPADM

## 2022-07-30 RX ORDER — GABAPENTIN 800 MG/1
800 TABLET ORAL 4 TIMES DAILY
Status: ON HOLD | COMMUNITY
End: 2022-07-30

## 2022-07-30 RX ORDER — ONDANSETRON 2 MG/ML
4 INJECTION INTRAMUSCULAR; INTRAVENOUS ONCE
Status: COMPLETED | OUTPATIENT
Start: 2022-07-30 | End: 2022-07-30

## 2022-07-30 RX ORDER — HYDROXYZINE PAMOATE 25 MG/1
25 CAPSULE ORAL ONCE
Status: COMPLETED | OUTPATIENT
Start: 2022-07-30 | End: 2022-07-30

## 2022-07-30 RX ORDER — IPRATROPIUM BROMIDE AND ALBUTEROL SULFATE 2.5; .5 MG/3ML; MG/3ML
1 SOLUTION RESPIRATORY (INHALATION)
Status: DISCONTINUED | OUTPATIENT
Start: 2022-07-31 | End: 2022-07-30 | Stop reason: HOSPADM

## 2022-07-30 RX ORDER — METOPROLOL SUCCINATE 25 MG/1
25 TABLET, EXTENDED RELEASE ORAL NIGHTLY
Status: DISCONTINUED | OUTPATIENT
Start: 2022-07-30 | End: 2022-07-30 | Stop reason: HOSPADM

## 2022-07-30 RX ORDER — PROCHLORPERAZINE EDISYLATE 5 MG/ML
10 INJECTION INTRAMUSCULAR; INTRAVENOUS EVERY 6 HOURS PRN
Status: DISCONTINUED | OUTPATIENT
Start: 2022-07-30 | End: 2022-07-30 | Stop reason: HOSPADM

## 2022-07-30 RX ORDER — GABAPENTIN 800 MG/1
800 TABLET ORAL 4 TIMES DAILY
COMMUNITY

## 2022-07-30 RX ORDER — THIAMINE MONONITRATE (VIT B1) 100 MG
100 TABLET ORAL DAILY
Qty: 30 TABLET | Refills: 0 | Status: SHIPPED | OUTPATIENT
Start: 2022-07-31

## 2022-07-30 RX ORDER — BUDESONIDE 0.5 MG/2ML
0.5 INHALANT ORAL 2 TIMES DAILY
Status: DISCONTINUED | OUTPATIENT
Start: 2022-07-31 | End: 2022-07-30 | Stop reason: HOSPADM

## 2022-07-30 RX ORDER — LORAZEPAM 4 MG/ML
INJECTION, SOLUTION INTRAMUSCULAR; INTRAVENOUS
Status: COMPLETED
Start: 2022-07-30 | End: 2022-07-30

## 2022-07-30 RX ORDER — LORAZEPAM 2 MG/ML
1 INJECTION INTRAMUSCULAR EVERY 4 HOURS PRN
Status: DISCONTINUED | OUTPATIENT
Start: 2022-07-30 | End: 2022-07-30 | Stop reason: HOSPADM

## 2022-07-30 RX ORDER — LORAZEPAM 2 MG/ML
1 INJECTION INTRAMUSCULAR ONCE
Status: DISCONTINUED | OUTPATIENT
Start: 2022-07-30 | End: 2022-07-30 | Stop reason: HOSPADM

## 2022-07-30 RX ORDER — BUDESONIDE 0.5 MG/2ML
0.5 INHALANT ORAL 2 TIMES DAILY
Status: DISCONTINUED | OUTPATIENT
Start: 2022-07-30 | End: 2022-07-30

## 2022-07-30 RX ORDER — ONDANSETRON 2 MG/ML
INJECTION INTRAMUSCULAR; INTRAVENOUS
Status: COMPLETED
Start: 2022-07-30 | End: 2022-07-30

## 2022-07-30 RX ORDER — GABAPENTIN 400 MG/1
800 CAPSULE ORAL 4 TIMES DAILY
Status: DISCONTINUED | OUTPATIENT
Start: 2022-07-30 | End: 2022-07-30 | Stop reason: HOSPADM

## 2022-07-30 RX ORDER — LEVETIRACETAM 10 MG/ML
1000 INJECTION INTRAVASCULAR ONCE
Status: COMPLETED | OUTPATIENT
Start: 2022-07-30 | End: 2022-07-30

## 2022-07-30 RX ADMIN — GABAPENTIN 800 MG: 400 CAPSULE ORAL at 20:02

## 2022-07-30 RX ADMIN — LEVETIRACETAM 500 MG: 500 TABLET, FILM COATED ORAL at 08:49

## 2022-07-30 RX ADMIN — ONDANSETRON 4 MG: 2 INJECTION INTRAMUSCULAR; INTRAVENOUS at 00:20

## 2022-07-30 RX ADMIN — LEVETIRACETAM 1000 MG: 10 INJECTION INTRAVASCULAR at 00:21

## 2022-07-30 RX ADMIN — METOPROLOL SUCCINATE 25 MG: 25 TABLET, EXTENDED RELEASE ORAL at 20:03

## 2022-07-30 RX ADMIN — DULOXETINE HYDROCHLORIDE 60 MG: 30 CAPSULE, DELAYED RELEASE ORAL at 08:53

## 2022-07-30 RX ADMIN — QUETIAPINE FUMARATE 300 MG: 100 TABLET ORAL at 20:02

## 2022-07-30 RX ADMIN — THIAMINE HCL TAB 100 MG 100 MG: 100 TAB at 08:53

## 2022-07-30 RX ADMIN — HYDROXYZINE PAMOATE 25 MG: 25 CAPSULE ORAL at 06:27

## 2022-07-30 RX ADMIN — LORAZEPAM 1 MG: 4 INJECTION INTRAMUSCULAR; INTRAVENOUS at 00:20

## 2022-07-30 RX ADMIN — LEVETIRACETAM 1000 MG: 10 INJECTION INTRAVENOUS at 00:21

## 2022-07-30 RX ADMIN — GABAPENTIN 800 MG: 400 CAPSULE ORAL at 13:27

## 2022-07-30 RX ADMIN — MIRTAZAPINE 30 MG: 15 TABLET, FILM COATED ORAL at 20:03

## 2022-07-30 RX ADMIN — ONDANSETRON HYDROCHLORIDE 4 MG: 2 INJECTION, SOLUTION INTRAMUSCULAR; INTRAVENOUS at 00:20

## 2022-07-30 RX ADMIN — GABAPENTIN 800 MG: 400 CAPSULE ORAL at 08:49

## 2022-07-30 RX ADMIN — LEVETIRACETAM 500 MG: 500 TABLET, FILM COATED ORAL at 20:02

## 2022-07-30 RX ADMIN — PANTOPRAZOLE SODIUM 40 MG: 40 TABLET, DELAYED RELEASE ORAL at 08:53

## 2022-07-30 RX ADMIN — DULOXETINE HYDROCHLORIDE 60 MG: 30 CAPSULE, DELAYED RELEASE ORAL at 20:02

## 2022-07-30 NOTE — DISCHARGE SUMMARY
Short Stay summary      Patient ID: Kirill Bingham      Patient's PCP: DYLAN Reed    Admit Date: 7/30/2022     Discharge Date:   7/30/2022    Admitting Physician: Heron Garcia MD    Discharge Physician: NGA Olvera     Reason for this admission:   Seizure    Discharge Diagnoses: Active Hospital Problems    Diagnosis Date Noted    Seizures ([facility] [address] [R56.9] 07/30/2022    History of alcohol dependence ([facility] [address] [F10.21] 07/30/2022    Altered mental status [R41.82] 07/30/2022    Bipolar disorder ([facility] [address] [F31.9] 10/14/2021    HTN (hypertension) [I10] 10/14/2021       Procedures:  CT Head WO Contrast   Final Result      1. Stable exam with no acute intracranial abnormality. Consults:   None    HISTORY OF PRESENT ILLNESS:   The patient is a 81 y.o. male with PMH of chronic back pain, HTN, bipolar disorder, seziure disorder, alcohol dependence (reports last drink in 2021) who was admitted due to altered mental status and seizure. Patient states he remembers feeling confused yesterday but not much else. Per family and chart review he was sitting up watching television and his wife noticed a twitching movement in his face and upper body. She called EMS. When EMS arrived he wasn't responding to their questions and they called a stroke alert. When they arrived at the hospital he had a witnessed tonic clonic seizure and then was post ictal. Patient and his family report he has a history of seizure disorder but has not had a seizure ni many years. Used to take seizure medicaiton (they think keppra). At time of exam today he is alert and oriented x 4. Answers questions appropriately. Family state he is at his baseline physical and mental status. No seizure activity overnight or throughout the day. Patient requesting discharge home stating he feels fine today and like his normal self. Review of system  Constitutional:  Denies fever or chills. Eyes:  Denies change in visual acuity or discharge.   HENT: Denies nasal congestion or sore throat. Respiratory:  Denies cough or shortness of breath. Cardiovascular:  Denies chest pain, palpitation or swelling in LEs. GI:  Denies abdominal pain, nausea, vomiting, bloody stools or diarrhea. :  Denies dysuria or frequency. Musculoskeletal:  positive for chronic back pain, joint pain. Integument:  Denies rash or itching. Neurologic:  Denies headache, focal weakness or sensory changes. Positive for seizure. Psychiatric:  Denies depression or anxiety. Past Medical History:      Diagnosis Date    Bipolar affective ([address]     Cholesterol blood decreased     Chronic back pain     COPD (chronic obstructive pulmonary disease) (HCC)     Depression     DJD (degenerative joint disease)     Environmental allergies     GERD (gastroesophageal reflux disease)     Hypertension     Insomnia     Seizures ([address]     patient was told a long time ago that he had a seizure       Past Surgical History:      Procedure Laterality Date    APPENDECTOMY      BACK SURGERY      CHOLECYSTECTOMY      HAND SURGERY Right 2014    HERNIA REPAIR         Social History:   TOBACCO:   reports that he has been smoking cigarettes. He has a 14.00 pack-year smoking history. He has never used smokeless tobacco.  ETOH:   Denies current alcohol use. OCCUPATION:  None     Family History:   History reviewed. No pertinent family history. Allergies:  Patient has no known allergies. Medications Prior to Admission:    Prior to Admission medications    Medication Sig Start Date End Date Taking? Authorizing Provider   gabapentin (NEURONTIN) 800 MG tablet Take 800 mg by mouth in the morning and 800 mg at noon and 800 mg in the evening and 800 mg before bedtime.    Yes Historical Provider, MD   thiamine mononitrate (THIAMINE) 100 MG tablet Take 1 tablet by mouth in the morning. 7/31/22  Yes NGA Weinstein   levETIRAcetam (KEPPRA) 500 MG tablet Take 1 tablet by mouth in the morning and 1 tablet before bedtime. 7/30/22  Yes NGA Wilder   QUEtiapine (SEROQUEL) 300 MG tablet Take 1 tablet by mouth nightly 6/23/21   Luz Living, APRTHAI   metoprolol succinate (TOPROL XL) 25 MG extended release tablet Take 1 tablet by mouth nightly 6/14/21   Luz Living, APRTHAI   omeprazole (PRILOSEC) 20 MG delayed release capsule Take 1 capsule by mouth Daily 6/4/21   Luz Living, APRTHAI   mirtazapine (REMERON) 30 MG tablet Take 30 mg by mouth nightly    Historical Provider, MD   cetirizine (ZYRTEC) 10 MG tablet Take 1 tablet by mouth daily as needed for Allergies 9/18/20   Luz Living, APRTHAI   DULoxetine (CYMBALTA) 60 MG extended release capsule Take 1 capsule by mouth 2 times daily 8/31/20   Luz Living, APRTHAI   fluticasone [facility]) 50 MCG/ACT nasal spray 1-2 sprays by Nasal route daily as needed for Rhinitis 8/31/20   Luz Living, DYLAN       Vital Signs  Temp: 97.7 °F (36.5 °C)  Heart Rate: 80  Resp: 18  BP: 133/89  SpO2: 95 %  O2 Device: None (Room air)  O2 Flow Rate (L/min): 3 L/min    Vital signs reviewed in electronic chart. Physical exam  Constitutional:  Well developed, well nourished, no acute distress. Eyes:  PERRL, conjunctiva normal, EOMI. HENT:  Atraumatic, external ears normal, external nose/nares normal, oropharynx moist, no pharyngeal exudates. Neck:  Supple. No JVD or thyromegaly. Respiratory:  No respiratory distress, normal breath sounds, no rales. Scattered rhonchi. Cardiovascular:  Normal rate, normal rhythm, no murmurs, no gallops, no rubs. GI:  Soft, nondistended, normal bowel sounds, nontender, no organomegaly, no mass. :  No costovertebral angle tenderness. Musculoskeletal:  No edema, no tenderness, no obvious deformities. Patient is moving all extremities. Integument:  Well hydrated, no rash. Neurologic:  Alert & oriented to self, place, month, year, day of the week, situation. no focal deficits noted. Strength is equal throughout.   Psychiatric:  Speech and behavior appropriate. Lab Results   Component Value Date    WBC 13.3 (H) 07/30/2022    HGB 12.9 (L) 07/30/2022    HCT 41.1 07/30/2022    MCV 97.2 07/30/2022     07/30/2022       Lab Results   Component Value Date     07/30/2022    K 4.3 07/30/2022     07/30/2022    CO2 29 07/30/2022    BUN 10 07/30/2022    CREATININE 1.1 07/30/2022    GLUCOSE 97 07/30/2022    CALCIUM 9.2 07/30/2022    PROT 7.0 07/30/2022    LABALBU 4.0 07/30/2022    BILITOT 0.3 07/30/2022    ALKPHOS 31 07/30/2022    AST 14 07/30/2022    ALT 10 07/30/2022    LABGLOM >59 07/30/2022    GFRAA >59 07/30/2022    AGRATIO 1.3 07/30/2022    GLOB 3.0 07/30/2022       Assessment and Plan/Hospital course: Active Hospital Problems    Diagnosis Date Noted    Seizures ([facility] [address] [R56.9] 07/30/2022    History of alcohol dependence ([facility] [address] [F10.21] 07/30/2022    Altered mental status [R41.82] 07/30/2022    Bipolar disorder ([facility] [address] [F31.9] 10/14/2021    HTN (hypertension) Librado Yu 10/14/2021     Patient presented with AMS and witnessed seizure. Received IV ativan and loading dose of keppra. CT head negative for acute finding. Patient and family report known history of seizure disorder. He takes neurontin 800 qid. No other seizure meds for quite some time. He was admitted and placed on seizure precautions. Started on keppra 500 bid. No further seizure activity overnight or throughout the day. Family visit and confirm he is at his baseline mental status and functional status. Patient requesting discharge home. Stable for dc home to continue keppra. Referral placed to neurology and  [facility] to work on this Monday. Discussed with patient wife and she will also try to discuss obtaining neuro follow up with patient's pcp Brittney Steele. Disposition: home    Discharged Condition: Stable    Activity: activity as tolerated, no driving until cleared by neurology   Diet: regular diet  Follow Up: Primary Care Provider Brittney Steele in one week.  Follow up with neurology in 2-3 weeks (referral placed). Discharge Medications:     Current Discharge Medication List             Details   thiamine mononitrate (THIAMINE) 100 MG tablet Take 1 tablet by mouth in the morning. Qty: 30 tablet, Refills: 0      levETIRAcetam (KEPPRA) 500 MG tablet Take 1 tablet by mouth in the morning and 1 tablet before bedtime. Qty: 60 tablet, Refills: 0                Details   gabapentin (NEURONTIN) 800 MG tablet Take 800 mg by mouth in the morning and 800 mg at noon and 800 mg in the evening and 800 mg before bedtime. QUEtiapine (SEROQUEL) 300 MG tablet Take 1 tablet by mouth nightly  Qty: 30 tablet, Refills: 5      metoprolol succinate (TOPROL XL) 25 MG extended release tablet Take 1 tablet by mouth nightly  Qty: 30 tablet, Refills: 3      omeprazole (PRILOSEC) 20 MG delayed release capsule Take 1 capsule by mouth Daily  Qty: 30 capsule, Refills: 5      mirtazapine (REMERON) 30 MG tablet Take 30 mg by mouth nightly      cetirizine (ZYRTEC) 10 MG tablet Take 1 tablet by mouth daily as needed for Allergies  Qty: 30 tablet, Refills: 5      DULoxetine (CYMBALTA) 60 MG extended release capsule Take 1 capsule by mouth 2 times daily  Qty: 60 capsule, Refills: 5      fluticasone (FLONASE) 50 MCG/ACT nasal spray 1-2 sprays by Nasal route daily as needed for Rhinitis  Qty: 1 Bottle, Refills: 5            Patient was seen and examined with Dr. Lisa Anderson. After reviewing patient data and diagnostic testing the plan of care was established in conjunction with Dr. Lisa Anderson. Signed:  Electronically signed by NGA Walsh on 7/30/2022 at 7:05 PM       Thank you DYLAN Denny for the opportunity to be involved in this patient's care. If you have any questions or concerns please feel free to contact me at [phone].

## 2022-07-30 NOTE — PLAN OF CARE
Problem: Discharge Planning  Goal: Discharge to home or other facility with appropriate resources  7/30/2022 0905 by Ermelinda Gomez RN  Outcome: Progressing  7/30/2022 0549 by Joel Ruffin RN  Outcome: Progressing  Flowsheets  Taken 7/30/2022 0072  Discharge to home or other facility with appropriate resources:   Identify barriers to discharge with patient and caregiver   Identify discharge learning needs (meds, wound care, etc)   Arrange for needed discharge resources and transportation as appropriate   Refer to discharge planning if patient needs post-hospital services based on physician order or complex needs related to functional status, cognitive ability or social support system  Taken 7/30/2022 0514  Discharge to home or other facility with appropriate resources:   Identify barriers to discharge with patient and caregiver   Arrange for needed discharge resources and transportation as appropriate     Problem: Safety - Adult  Goal: Free from fall injury  Outcome: Progressing     Problem: ABCDS Injury Assessment  Goal: Absence of physical injury  7/30/2022 0905 by Ermelinda Gomez RN  Outcome: Progressing  7/30/2022 0549 by Joel Ruffin RN  Outcome: Progressing

## 2022-07-30 NOTE — ED PROVIDER NOTES
[address] [place] [address] Court  eMERGENCY dEPARTMENT eNCOUnter      Pt Name: Mindi Felton  MRN: [id number]  [place] 1950  Date of evaluation: 7/30/2022  Provider: Dilip Alfaro, [address] [place] [address]       Chief Complaint   Patient presents with    Seizures         HISTORY OF PRESENT ILLNESS   (Location/Symptom, Timing/Onset, Context/Setting, Quality, Duration, Modifying Factors, Severity)  Note limiting factors. Mindi Felton is a 81 y.o. male who presents to the emergency department for altered mental status. Phone call went out to EMS about patient twitching and unsure if he was responsive and breathing. Wife was there when EMS got there and she said that it started 30 min prior to EMS arrival. Pt was sitting and watching TV, nothing different or strenuous, no falls, was having every now and then twitching of face and upper body. EMS called stroke alert and was taking patient to helipad. Sugar was 130's. He started having tonic clonic seizure in back of truck, lasted for 15 sec and went post ictal.       Nursing Notes were reviewed.     REVIEW OF SYSTEMS    (2-9 systems for level 4, 10 or more forlevel 5)     Review of Systems   Unable to perform ROS: Acuity of condition (Pt post ictal from seizure and altered)         PAST MEDICAL HISTORY     Past Medical History:   Diagnosis Date    Bipolar affective ([address]     Cholesterol blood decreased     Chronic back pain     COPD (chronic obstructive pulmonary disease) (HCC)     Depression     DJD (degenerative joint disease)     Environmental allergies     GERD (gastroesophageal reflux disease)     Hypertension     Insomnia     Seizures ([address]     patient was told a long time ago that he had a seizure         SURGICAL HISTORY       Past Surgical History:   Procedure Laterality Date    APPENDECTOMY      BACK SURGERY      CHOLECYSTECTOMY      HAND SURGERY Right 2014         CURRENT MEDICATIONS       Previous Medications    CETIRIZINE (ZYRTEC) 10 MG TABLET Take 1 tablet by mouth daily as needed for Allergies    DULOXETINE (CYMBALTA) 60 MG EXTENDED RELEASE CAPSULE    Take 1 capsule by mouth 2 times daily    FLUTICASONE (FLONASE) 50 MCG/ACT NASAL SPRAY    1-2 sprays by Nasal route daily as needed for Rhinitis    GABAPENTIN (NEURONTIN) 800 MG TABLET    Take 800 mg by mouth in the morning and 800 mg at noon and 800 mg in the evening and 800 mg before bedtime. METOPROLOL SUCCINATE (TOPROL XL) 25 MG EXTENDED RELEASE TABLET    Take 1 tablet by mouth nightly    MIRTAZAPINE (REMERON) 30 MG TABLET    Take 30 mg by mouth nightly    OMEPRAZOLE (PRILOSEC) 20 MG DELAYED RELEASE CAPSULE    Take 1 capsule by mouth Daily    QUETIAPINE (SEROQUEL) 300 MG TABLET    Take 1 tablet by mouth nightly       ALLERGIES     Patient has no known allergies. FAMILY HISTORY     History reviewed. No pertinent family history. SOCIAL HISTORY       Social History     Socioeconomic History    Marital status:      Spouse name: None    Number of children: None    Years of education: None    Highest education level: None   Tobacco Use    Smoking status: Every Day     Packs/day: 1.00     Years: 14.00     Pack years: 14.00     Types: Cigarettes    Smokeless tobacco: Never   Substance and Sexual Activity    Alcohol use: Yes     Comment: alcohol intoxication 08/03 and 08/04. Drug use: No       SCREENINGS    [place] Coma Scale  Eye Opening: Spontaneous  Best Verbal Response:  Incomprehensible speech  Best Motor Response: Obeys commands  Scott Coma Scale Score: 12        PHYSICAL EXAM    (up to 7 for level 4, 8 or more for level 5)     ED Triage Vitals [07/30/22 0006]   BP Temp Temp src Heart Rate Resp SpO2 Height Weight   (!) 160/84 -- -- (!) 113 20 98 % -- 180 lb (81.6 kg)       Physical Exam    DIAGNOSTIC RESULTS     EKG: All EKG's are interpreted by the Emergency Department Physician who either signs or Co-signs this chart in the absence of a cardiologist.     Sinus Tachycardia; Normal  ms; Normal QRS 96 ms; No acute ST-T elevation; depression; Normal axis    RADIOLOGY:   Non-plain film images such as CT, Ultrasound and MRI are read by the radiologist. Plain radiographic images are visualized andpreliminarily interpreted by the emergency physician with the below findings:    CT head - See Below    Interpretationper the Radiologist below, if available at the time of this note:    CT Head WO Contrast   Final Result      1. Stable exam with no acute intracranial abnormality. ED BEDSIDE ULTRASOUND:   Performed by ED Physician - none    LABS:  Labs Reviewed   CBC WITH AUTO DIFFERENTIAL - Abnormal; Notable for the following components:       Result Value    WBC 15.9 (*)     MCHC 30.5 (*)     RDW 16.2 (*)     MPV 11.1 (*)     Neutrophils Absolute 9.6 (*)     Monocytes Absolute 1.7 (*)     Eosinophils Absolute 0.6 (*)     All other components within normal limits   COMPREHENSIVE METABOLIC PANEL W/ REFLEX TO MG FOR LOW K - Abnormal; Notable for the following components:    CO2 17 (*)     Anion Gap 30 (*)     Glucose 152 (*)     Total Bilirubin <0.2 (*)     All other components within normal limits   URINALYSIS WITH REFLEX TO CULTURE - Abnormal; Notable for the following components:    Protein,  (*)     All other components within normal limits   MICROSCOPIC URINALYSIS - Abnormal; Notable for the following components:    Bacteria, UA 1+ (*)     All other components within normal limits   POCT GLUCOSE - Abnormal; Notable for the following components:    POC Glucose 157 (*)     All other components within normal limits   TROPONIN   APTT   PROTIME-INR   DRUG SCREEN MULTI URINE   ETHANOL       All other labs were within normal range or not returned as of this dictation.     EMERGENCY DEPARTMENT COURSE and DIFFERENTIAL DIAGNOSIS/MDM:   Vitals:    Vitals:    07/30/22 0245 07/30/22 0300 07/30/22 0315 07/30/22 0330   BP: 120/86 131/84 135/84 134/76   Pulse: 97 92 91 90   Resp: 20 17 18 18   SpO2: 96% 98% 99% 98%   Weight:               CRITICAL CARE TIME   Total Critical Care time was 30 minutes, excluding separatelyreportable procedures. There was a high probability ofclinically significant/life threatening deterioration in the patient's condition which required my urgent intervention. Due to patient being \"stroke alert\", confused elderly altered mental status, seizure treatment, NIHSS assessment and seizure control with IV medications and ativan, needing admission    CONSULTS:  Called and spoke with [facility] PA and after discussion and review of history, labs, exam, will admit for seizure and precautions    PROCEDURES:  None    PROGRESS NOTES:    Pt came in by EMS as \"stroke alert\". He had altered mental status, \"twitching movement\" and ended up having seizure while with EMS causing them to divert from [facility] and helicopter turned around at that point. Pt noted to be post ictal while in ED. Gave ativan 1 mg and 1 gram of Keppra IV. Pt was sent for immediate head CT. Pt altered but alert at this point and seems post ictal  Pt back from CT scan and now awake alert and oriented X 3. Post ct head stroke assessment 0 with no acute deficits. No confusion. Pt's daughter in room and said he is back to baseline. Pt with history of 2-3 seizure like events 10 years ago. Was on Keppra for a long time but hasn't been on it for awhile. History of alcohol abuse but hasn't drank anything in the past 8-9 months. Ordered drug screen, alcohol, basic labs, UA since he had UTI last visit noted. Pt without confusion. No gross changes to labs. Elevated WBC but most likely from stress as he doesn't have any infection. No meningitis signs. Is this patient to be included in the SEP-1 Core Measure due to severe sepsis or septic shock? No   Exclusion criteria - the patient is NOT to be included for SEP-1 Core Measure due to: Infection is not suspected       FINAL IMPRESSION      1. Seizure ([address] New Problem   2.

## 2022-07-30 NOTE — ED NOTES
Patient arrives to ED via [facility] EMS for evaluation & treatment of witnessed seizure while awaiting air transport at [facility]. Per EMS patient was found with AMS x 1 hour on scene. Patient was unable to follow commands with EMS, Unable to answer questions, EMS had made decision to have air methods meet them at [facility] for further management & transport to appropriate facility. Per EMS patient had approximately 15 seconds of witnessed seizure activity while awaiting air transport at [facility] & decision was made by EMS to bring patient into [facility] ED at that time. On arrival to ED patient appears post-ictal, snoring respirations, VSS. MD notified & @ bedside.      Amber Markham RN  07/30/22 5771

## 2022-07-30 NOTE — ED NOTES
pts family at bedside states she will be leaving to take family home.  Her name is Bunnie Primrose and contact phone is [phone]     Lambert Barakat RN  07/30/22 3682

## 2022-07-30 NOTE — FLOWSHEET NOTE
Bladder scan complete at this time with 61 ml of urine noted. Bladder scan per request of Orlando SYLVESTER r/t urine output of 470 for the shift.

## 2022-07-30 NOTE — ED NOTES
Report given to Mendy Mittal RN on med unit. Patient will be going to room 7.      Jessica Barillas RN  07/30/22 3581

## 2022-07-30 NOTE — DISCHARGE INSTRUCTIONS
Disposition: home    Discharged Condition: Stable    Activity: activity as tolerated, no driving until cleared by neurology   Diet: regular diet  Follow Up: Primary Care Provider Syed Heath in one week. Follow up with neurology in 2-3 weeks (referral placed).

## 2022-07-30 NOTE — FLOWSHEET NOTE
07/30/22 0857   Assessment   Charting Type Shift assessment   Psychosocial   Psychosocial (WDL) WDL   Neurological   Level of Consciousness Alert (0)   Orientation Level Oriented X4   Cognition Follows commands; Appropriate judgement; Appropriate safety awareness; Appropriate attention/concentration; Appropriate for developmental age   Speech Clear   R Pupil Size (mm) 3   R Pupil Shape Round   R Pupil Reaction Brisk   L Pupil Size (mm) 3   L Pupil Shape Round   L Pupil Reaction Brisk   R Hand  Strong   L Hand  Strong   R Foot Dorsiflexion Strong   L Foot Dorsiflexion Strong   R Foot Plantar Flexion Strong   L Foot Plantar Flexion Strong   RUE Motor Response Normal extension;Normal flexion   RUE Sensation  Full sensation   LUE Motor Response Normal extension;Normal flexion   LUE Sensation  Full sensation   RLE Motor Response Normal extension;Normal flexion   RLE Sensation  Full sensation   LLE Motor Response Normal extension;Normal flexion   LLE Sensation  Full sensation   Swallow Screening   Is the patient unable to remain alert for testing?  No   [place] Coma Scale   Eye Opening 4   Best Verbal Response 5   Best Motor Response 6   [place] Coma Scale Score 15   HEENT (Head, Ears, Eyes, Nose, & Throat)   HEENT (WDL) X   Right Ear Other (comment)  (ringing comes and goes)   Left Ear Other (comment)  (ringing comes and goes)   Teeth Edentulous   Right Eye Impaired vision   Left Eye Impaired vision   Respiratory   Respiratory (WDL) X   Respiratory Interventions Cough & deep breathe   Breath Sounds   Right Upper Lobe Expiratory Wheezes   Right Middle Lobe Expiratory Wheezes   Right Lower Lobe Expiratory Wheezes   Left Upper Lobe Expiratory Wheezes   Left Lower Lobe Expiratory Wheezes   Cardiac   Cardiac (WDL) WDL   Gastrointestinal   Abdominal (WDL) WDL   Last BM (including prior to admit) 07/29/22   RUQ Bowel Sounds Active   LUQ Bowel Sounds Active   RLQ Bowel Sounds Active   LLQ Bowel Sounds Active   Tenderness Nontender   Hernia Other (Comment)  (hiatal Repaired)   Genitourinary   Genitourinary (WDL) WDL   Peripheral Vascular   Peripheral Vascular (WDL) WDL   Edema None   Skin Integumentary    Skin Integumentary (WDL) WDL   Musculoskeletal   Musculoskeletal (WDL) WDL

## 2022-07-31 NOTE — H&P
Short Stay summary      Patient ID: Kacey Mandujano      Patient's PCP: DYLAN Morejon    Admit Date: 7/30/2022     Discharge Date:   7/30/2022    Admitting Physician: Wendy Serrano MD    Discharge Physician: NGA Petit     Reason for this admission:   Seizure    Discharge Diagnoses: Active Hospital Problems    Diagnosis Date Noted    Seizures ([facility] [R56.9] 07/30/2022    History of alcohol dependence ([facility] [F10.21] 07/30/2022    Altered mental status [R41.82] 07/30/2022    Bipolar disorder ([facility] [F31.9] 10/14/2021    HTN (hypertension) [I10] 10/14/2021       Procedures:  CT Head WO Contrast   Final Result      1. Stable exam with no acute intracranial abnormality. Consults:   None    HISTORY OF PRESENT ILLNESS:   The patient is a 81 y.o. male with PMH of chronic back pain, HTN, bipolar disorder, seziure disorder, alcohol dependence (reports last drink in 2021) who was admitted due to altered mental status and seizure. Patient states he remembers feeling confused yesterday but not much else. Per family and chart review he was sitting up watching television and his wife noticed a twitching movement in his face and upper body. She called EMS. When EMS arrived he wasn't responding to their questions and they called a stroke alert. When they arrived at the hospital he had a witnessed tonic clonic seizure and then was post ictal. Patient and his family report he has a history of seizure disorder but has not had a seizure ni many years. Used to take seizure medicaiton (they think keppra). At time of exam today he is alert and oriented x 4. Answers questions appropriately. Family state he is at his baseline physical and mental status. No seizure activity overnight or throughout the day. Patient requesting discharge home stating he feels fine today and like his normal self. Review of system  Constitutional:  Denies fever or chills. Eyes:  Denies change in visual acuity or discharge.   HENT: Denies nasal congestion or sore throat. Respiratory:  Denies cough or shortness of breath. Cardiovascular:  Denies chest pain, palpitation or swelling in LEs. GI:  Denies abdominal pain, nausea, vomiting, bloody stools or diarrhea. :  Denies dysuria or frequency. Musculoskeletal:  positive for chronic back pain, joint pain. Integument:  Denies rash or itching. Neurologic:  Denies headache, focal weakness or sensory changes. Positive for seizure. Psychiatric:  Denies depression or anxiety. Past Medical History:      Diagnosis Date    Bipolar affective ([address]     Cholesterol blood decreased     Chronic back pain     COPD (chronic obstructive pulmonary disease) (HCC)     Depression     DJD (degenerative joint disease)     Environmental allergies     GERD (gastroesophageal reflux disease)     Hypertension     Insomnia     Seizures ([address]     patient was told a long time ago that he had a seizure       Past Surgical History:      Procedure Laterality Date    APPENDECTOMY      BACK SURGERY      CHOLECYSTECTOMY      HAND SURGERY Right 2014    HERNIA REPAIR         Social History:   TOBACCO:   reports that he has been smoking cigarettes. He has a 14.00 pack-year smoking history. He has never used smokeless tobacco.  ETOH:   Denies current alcohol use. OCCUPATION:  None     Family History:   History reviewed. No pertinent family history. Allergies:  Patient has no known allergies. Medications Prior to Admission:    Prior to Admission medications    Medication Sig Start Date End Date Taking? Authorizing Provider   gabapentin (NEURONTIN) 800 MG tablet Take 800 mg by mouth in the morning and 800 mg at noon and 800 mg in the evening and 800 mg before bedtime.    Yes Historical Provider, MD   thiamine mononitrate (THIAMINE) 100 MG tablet Take 1 tablet by mouth in the morning. 7/31/22  Yes NGA Harrison   levETIRAcetam (KEPPRA) 500 MG tablet Take 1 tablet by mouth in the morning and 1 tablet before bedtime. 7/30/22  Yes NGA Walsh   QUEtiapine (SEROQUEL) 300 MG tablet Take 1 tablet by mouth nightly 6/23/21   DYLAN Denny   metoprolol succinate (TOPROL XL) 25 MG extended release tablet Take 1 tablet by mouth nightly 6/14/21   DYLAN Denny   omeprazole (PRILOSEC) 20 MG delayed release capsule Take 1 capsule by mouth Daily 6/4/21   DYLAN Denny   mirtazapine (REMERON) 30 MG tablet Take 30 mg by mouth nightly    Historical Provider, MD   cetirizine (ZYRTEC) 10 MG tablet Take 1 tablet by mouth daily as needed for Allergies 9/18/20   DYLAN Denny   DULoxetine (CYMBALTA) 60 MG extended release capsule Take 1 capsule by mouth 2 times daily 8/31/20   DYLAN Denny   fluticasone Corazon Hilt) 50 MCG/ACT nasal spray 1-2 sprays by Nasal route daily as needed for Rhinitis 8/31/20   DYLAN Denny       Vital Signs  Temp: 97.7 °F (36.5 °C)  Heart Rate: 80  Resp: 18  BP: 133/89  SpO2: 95 %  O2 Device: None (Room air)  O2 Flow Rate (L/min): 3 L/min    Vital signs reviewed in electronic chart. Physical exam  Constitutional:  Well developed, well nourished, no acute distress. Eyes:  PERRL, conjunctiva normal, EOMI. HENT:  Atraumatic, external ears normal, external nose/nares normal, oropharynx moist, no pharyngeal exudates. Neck:  Supple. No JVD or thyromegaly. Respiratory:  No respiratory distress, normal breath sounds, no rales. Scattered rhonchi. Cardiovascular:  Normal rate, normal rhythm, no murmurs, no gallops, no rubs. GI:  Soft, nondistended, normal bowel sounds, nontender, no organomegaly, no mass. :  No costovertebral angle tenderness. Musculoskeletal:  No edema, no tenderness, no obvious deformities. Patient is moving all extremities. Integument:  Well hydrated, no rash. Neurologic:  Alert & oriented to self, place, month, year, day of the week, situation. no focal deficits noted. Strength is equal throughout.   Psychiatric:  Speech and behavior appropriate. Lab Results   Component Value Date    WBC 13.3 (H) 07/30/2022    HGB 12.9 (L) 07/30/2022    HCT 41.1 07/30/2022    MCV 97.2 07/30/2022     07/30/2022       Lab Results   Component Value Date     07/30/2022    K 4.3 07/30/2022     07/30/2022    CO2 29 07/30/2022    BUN 10 07/30/2022    CREATININE 1.1 07/30/2022    GLUCOSE 97 07/30/2022    CALCIUM 9.2 07/30/2022    PROT 7.0 07/30/2022    LABALBU 4.0 07/30/2022    BILITOT 0.3 07/30/2022    ALKPHOS 31 07/30/2022    AST 14 07/30/2022    ALT 10 07/30/2022    LABGLOM >59 07/30/2022    GFRAA >59 07/30/2022    AGRATIO 1.3 07/30/2022    GLOB 3.0 07/30/2022       Assessment and Plan/Hospital course: Active Hospital Problems    Diagnosis Date Noted    Seizures ([facility] [R56.9] 07/30/2022    History of alcohol dependence ([facility] [F10.21] 07/30/2022    Altered mental status [R41.82] 07/30/2022    Bipolar disorder ([facility] [F31.9] 10/14/2021    HTN (hypertension) McDonald Lesches 10/14/2021     Patient presented with AMS and witnessed seizure. Received IV ativan and loading dose of keppra. CT head negative for acute finding. Patient and family report known history of seizure disorder. He takes neurontin 800 qid. No other seizure meds for quite some time. He was admitted and placed on seizure precautions. Started on keppra 500 bid. No further seizure activity overnight or throughout the day. Family visit and confirm he is at his baseline mental status and functional status. Patient requesting discharge home. Stable for dc home to continue keppra. Referral placed to neurology and  aware to work on this Monday. Discussed with patient wife and she will also try to discuss obtaining neuro follow up with patient's pcp Nalini Levine. Disposition: home    Discharged Condition: Stable    Activity: activity as tolerated, no driving until cleared by neurology   Diet: regular diet  Follow Up: Primary Care Provider Nalini Levine in one week.  Follow up with neurology in 2-3 weeks (referral placed). Discharge Medications:     Current Discharge Medication List             Details   thiamine mononitrate (THIAMINE) 100 MG tablet Take 1 tablet by mouth in the morning. Qty: 30 tablet, Refills: 0      levETIRAcetam (KEPPRA) 500 MG tablet Take 1 tablet by mouth in the morning and 1 tablet before bedtime. Qty: 60 tablet, Refills: 0                Details   gabapentin (NEURONTIN) 800 MG tablet Take 800 mg by mouth in the morning and 800 mg at noon and 800 mg in the evening and 800 mg before bedtime. QUEtiapine (SEROQUEL) 300 MG tablet Take 1 tablet by mouth nightly  Qty: 30 tablet, Refills: 5      metoprolol succinate (TOPROL XL) 25 MG extended release tablet Take 1 tablet by mouth nightly  Qty: 30 tablet, Refills: 3      omeprazole (PRILOSEC) 20 MG delayed release capsule Take 1 capsule by mouth Daily  Qty: 30 capsule, Refills: 5      mirtazapine (REMERON) 30 MG tablet Take 30 mg by mouth nightly      cetirizine (ZYRTEC) 10 MG tablet Take 1 tablet by mouth daily as needed for Allergies  Qty: 30 tablet, Refills: 5      DULoxetine (CYMBALTA) 60 MG extended release capsule Take 1 capsule by mouth 2 times daily  Qty: 60 capsule, Refills: 5      fluticasone (FLONASE) 50 MCG/ACT nasal spray 1-2 sprays by Nasal route daily as needed for Rhinitis  Qty: 1 Bottle, Refills: 5            Patient was seen and examined with Dr. Rohit Mariscal. After reviewing patient data and diagnostic testing the plan of care was established in conjunction with Dr. Rohit Mariscal. Signed:  Electronically signed by NGA Wilder on 7/30/2022 at 7:05 PM       Thank you DYALN Green for the opportunity to be involved in this patient's care. If you have any questions or concerns please feel free to contact me at [phone].

## 2022-07-31 NOTE — PROGRESS NOTES
Reviewed discharge paperwork with patient and family, verbalized understanding. Patient to pov via w/c without incident.

## 2024-01-08 ENCOUNTER — HOSPITAL ENCOUNTER (EMERGENCY)
Facility: HOSPITAL | Age: 74
Discharge: HOME | End: 2024-01-08
Attending: HOSPITALIST
Payer: MEDICARE

## 2024-01-08 ENCOUNTER — APPOINTMENT (OUTPATIENT)
Dept: GENERAL RADIOLOGY | Facility: HOSPITAL | Age: 74
End: 2024-01-08
Attending: HOSPITALIST
Payer: MEDICARE

## 2024-01-08 VITALS
HEART RATE: 87 BPM | WEIGHT: 180 LBS | SYSTOLIC BLOOD PRESSURE: 162 MMHG | RESPIRATION RATE: 16 BRPM | TEMPERATURE: 98.4 F | BODY MASS INDEX: 26.58 KG/M2 | DIASTOLIC BLOOD PRESSURE: 95 MMHG | OXYGEN SATURATION: 96 %

## 2024-01-08 DIAGNOSIS — M47.26 OSTEOARTHRITIS OF SPINE WITH RADICULOPATHY, LUMBAR REGION: Primary | ICD-10-CM

## 2024-01-08 DIAGNOSIS — M25.551 RIGHT HIP PAIN: ICD-10-CM

## 2024-01-08 DIAGNOSIS — M54.31 SCIATICA OF RIGHT SIDE: ICD-10-CM

## 2024-01-08 PROCEDURE — 96372 THER/PROPH/DIAG INJ SC/IM: CPT

## 2024-01-08 PROCEDURE — 99284 EMERGENCY DEPT VISIT MOD MDM: CPT

## 2024-01-08 PROCEDURE — 6370000000 HC RX 637 (ALT 250 FOR IP): Performed by: HOSPITALIST

## 2024-01-08 PROCEDURE — 72100 X-RAY EXAM L-S SPINE 2/3 VWS: CPT

## 2024-01-08 PROCEDURE — 73502 X-RAY EXAM HIP UNI 2-3 VIEWS: CPT

## 2024-01-08 PROCEDURE — 6360000002 HC RX W HCPCS: Performed by: HOSPITALIST

## 2024-01-08 RX ORDER — DEXAMETHASONE SODIUM PHOSPHATE 10 MG/ML
10 INJECTION INTRAMUSCULAR; INTRAVENOUS ONCE
Status: COMPLETED | OUTPATIENT
Start: 2024-01-08 | End: 2024-01-08

## 2024-01-08 RX ORDER — OXYCODONE HYDROCHLORIDE AND ACETAMINOPHEN 5; 325 MG/1; MG/1
1 TABLET ORAL ONCE
Status: COMPLETED | OUTPATIENT
Start: 2024-01-08 | End: 2024-01-08

## 2024-01-08 RX ORDER — KETOROLAC TROMETHAMINE 30 MG/ML
30 INJECTION, SOLUTION INTRAMUSCULAR; INTRAVENOUS ONCE
Status: COMPLETED | OUTPATIENT
Start: 2024-01-08 | End: 2024-01-08

## 2024-01-08 RX ADMIN — OXYCODONE AND ACETAMINOPHEN 1 TABLET: 5; 325 TABLET ORAL at 14:16

## 2024-01-08 RX ADMIN — KETOROLAC TROMETHAMINE 30 MG: 30 INJECTION, SOLUTION INTRAMUSCULAR; INTRAVENOUS at 14:17

## 2024-01-08 RX ADMIN — DEXAMETHASONE SODIUM PHOSPHATE 10 MG: 10 INJECTION INTRAMUSCULAR; INTRAVENOUS at 14:17

## 2024-01-08 ASSESSMENT — LIFESTYLE VARIABLES
HOW MANY STANDARD DRINKS CONTAINING ALCOHOL DO YOU HAVE ON A TYPICAL DAY: PATIENT DOES NOT DRINK
HOW OFTEN DO YOU HAVE A DRINK CONTAINING ALCOHOL: NEVER

## 2024-01-08 ASSESSMENT — PAIN - FUNCTIONAL ASSESSMENT: PAIN_FUNCTIONAL_ASSESSMENT: 0-10

## 2024-01-08 ASSESSMENT — PAIN DESCRIPTION - DESCRIPTORS
DESCRIPTORS: ACHING
DESCRIPTORS: ACHING

## 2024-01-08 ASSESSMENT — PAIN SCALES - GENERAL
PAINLEVEL_OUTOF10: 5
PAINLEVEL_OUTOF10: 5

## 2024-01-08 ASSESSMENT — PAIN DESCRIPTION - LOCATION
LOCATION: BACK;LEG;HIP
LOCATION: BACK;LEG

## 2024-01-08 ASSESSMENT — PAIN DESCRIPTION - ORIENTATION
ORIENTATION: RIGHT
ORIENTATION: RIGHT

## 2024-01-08 ASSESSMENT — PAIN DESCRIPTION - PAIN TYPE: TYPE: ACUTE PAIN

## 2024-01-08 NOTE — ED NOTES
Pt left ED in wheelchair with belongings at this time. Pt and grandson verbalized understanding of discharge instructions. Pt able to get into vehicle without assistance.

## 2024-01-08 NOTE — ED TRIAGE NOTES
Pt arrives to ED POV with complaints of R hip/leg aching and lower back pain. Pt states his back has \"hurt for years\" but has worsened over the last couple days. Pt states he called his PCP but she instructed him to come to ED for evaluation. Pt requesting x-rays.

## 2024-01-08 NOTE — ED PROVIDER NOTES
[facility]  EMERGENCY DEPARTMENT ENCOUNTER        Pt Name: Derrell Gonzáles  MRN: [id number]  Birthdate 1950  Date of evaluation: 1/8/2024  Provider: Chaz Solorzano DO  PCP: Leilani Anderson APRN  Note Started: 1:50 PM EST 1/8/24    CHIEF COMPLAINT       Chief Complaint   Patient presents with    Lower Back Pain     mid    Hip Pain     R    Leg Pain     R       HISTORY OF PRESENT ILLNESS: 1 or more Elements     History from : Patient    Limitations to history : None    Derrell Gonzáles is a 73 y.o. male who presents to the emergency department for mid low back and right hip/leg pain.  Patient states is actually been ongoing for the past 3 months.  But worse since around [place].  He denies any fall trauma or injury exacerbating the symptoms.  Patient does have history of lumbar surgery.  He states he has had back pain before and received steroid shots with similar symptoms that seem to help in his primary care provider's office.  He tried follow-up with individual it actually performed a back surgery on him at  but it was will be March before he can get into see them.  His PCP advised him to come here to the emergency department.  He states he does sometimes have numbness and tingling to the extremity but he denies any today.  He states this just straight shooting pain from his hip all the way down to his knee states it does not go all the way down to the foot so does seem more consistent with piriformis versus sciatica.  Patient states in the past he has had loss of bowel or bladder control at short period of time but he denies any at this time.  No red flags concerning for spinal abscess.  Patient came specifically wanting to have x-rays performed and these were put in by the triage nurse upon triage because of wait times because of volume here even though he had nontraumatic discomfort or pain.  Past medical history sniffer bipolar affective disorder, low cholesterol, chronic

## 2024-02-09 ENCOUNTER — APPOINTMENT (OUTPATIENT)
Dept: CT IMAGING | Facility: HOSPITAL | Age: 74
End: 2024-02-09
Payer: MEDICARE

## 2024-02-09 ENCOUNTER — HOSPITAL ENCOUNTER (EMERGENCY)
Facility: HOSPITAL | Age: 74
Discharge: HOME | End: 2024-02-09
Attending: EMERGENCY MEDICINE
Payer: MEDICARE

## 2024-02-09 VITALS
WEIGHT: 170 LBS | TEMPERATURE: 98.2 F | BODY MASS INDEX: 25.18 KG/M2 | HEART RATE: 88 BPM | HEIGHT: 69 IN | DIASTOLIC BLOOD PRESSURE: 107 MMHG | OXYGEN SATURATION: 97 % | RESPIRATION RATE: 18 BRPM | SYSTOLIC BLOOD PRESSURE: 123 MMHG

## 2024-02-09 DIAGNOSIS — M54.32 SCIATICA OF LEFT SIDE: Primary | ICD-10-CM

## 2024-02-09 PROCEDURE — 99284 EMERGENCY DEPT VISIT MOD MDM: CPT

## 2024-02-09 PROCEDURE — 96372 THER/PROPH/DIAG INJ SC/IM: CPT

## 2024-02-09 PROCEDURE — 6360000002 HC RX W HCPCS: Performed by: EMERGENCY MEDICINE

## 2024-02-09 RX ORDER — HYDROCODONE BITARTRATE AND ACETAMINOPHEN 7.5; 325 MG/1; MG/1
1 TABLET ORAL EVERY 6 HOURS PRN
Qty: 12 TABLET | Refills: 0 | Status: SHIPPED | OUTPATIENT
Start: 2024-02-09 | End: 2024-02-12

## 2024-02-09 RX ORDER — MORPHINE SULFATE 10 MG/ML
8 INJECTION, SOLUTION INTRAMUSCULAR; INTRAVENOUS
Status: COMPLETED | OUTPATIENT
Start: 2024-02-09 | End: 2024-02-09

## 2024-02-09 RX ORDER — DEXAMETHASONE SODIUM PHOSPHATE 10 MG/ML
8 INJECTION INTRAMUSCULAR; INTRAVENOUS ONCE
Status: COMPLETED | OUTPATIENT
Start: 2024-02-09 | End: 2024-02-09

## 2024-02-09 RX ORDER — PROMETHAZINE HYDROCHLORIDE 25 MG/ML
25 INJECTION, SOLUTION INTRAMUSCULAR; INTRAVENOUS ONCE
Status: COMPLETED | OUTPATIENT
Start: 2024-02-09 | End: 2024-02-09

## 2024-02-09 RX ORDER — DEXAMETHASONE 0.5 MG/1
0.5 TABLET ORAL EVERY 6 HOURS
Qty: 20 TABLET | Refills: 0 | Status: SHIPPED | OUTPATIENT
Start: 2024-02-09 | End: 2024-02-14

## 2024-02-09 RX ADMIN — PROMETHAZINE HYDROCHLORIDE 25 MG: 25 INJECTION INTRAMUSCULAR; INTRAVENOUS at 07:14

## 2024-02-09 RX ADMIN — MORPHINE SULFATE 8 MG: 10 INJECTION INTRAVENOUS at 07:14

## 2024-02-09 RX ADMIN — DEXAMETHASONE SODIUM PHOSPHATE 8 MG: 10 INJECTION INTRAMUSCULAR; INTRAVENOUS at 07:14

## 2024-02-09 ASSESSMENT — LIFESTYLE VARIABLES
HOW OFTEN DO YOU HAVE A DRINK CONTAINING ALCOHOL: NEVER
HOW MANY STANDARD DRINKS CONTAINING ALCOHOL DO YOU HAVE ON A TYPICAL DAY: PATIENT DOES NOT DRINK

## 2024-02-09 ASSESSMENT — PAIN SCALES - GENERAL
PAINLEVEL_OUTOF10: 10
PAINLEVEL_OUTOF10: 4

## 2024-02-09 ASSESSMENT — PAIN - FUNCTIONAL ASSESSMENT
PAIN_FUNCTIONAL_ASSESSMENT: 0-10
PAIN_FUNCTIONAL_ASSESSMENT: 0-10

## 2024-02-09 NOTE — ED NOTES
CT called and states that patient will not lay still enough for the CT. Dr Stoddard notified and radiology told to bring patient back to ED.

## 2024-02-09 NOTE — ED NOTES
AVS and Rx reviewed with patient, understanding verbalized. Patient encouraged to follow up with PCP and /or return to ED as needed.

## 2024-02-09 NOTE — ED PROVIDER NOTES
.        [facility]  EMERGENCY DEPARTMENT ENCOUNTER        Pt Name: Derrell Gonzáles  MRN: [id number]  Birthdate 1950  Date of evaluation: 2/9/2024  Provider: Kaye Stoddard MD  PCP: Leilani Anderson APRN  Note Started: 7:10 AM EST 2/9/24    CHIEF COMPLAINT       Chief Complaint   Patient presents with    Leg Pain     C/o left leg pain,was seen here 2-3 wks.ago.Dx'ed with arthritis/sciatic nerve.       HISTORY OF PRESENT ILLNESS: 1 or more Elements     History from : Patient    Limitations to history : None    Derrell Gonzáles is a 73 y.o. male who presents complaining of burning pain shooting from his left posterior lateral hip down the upper part of his leg he has not sustained any known injury he was seen a month ago had x-rays done which showed osteoarthritis and diagnosed with sciatica he does have an appointment with a back surgeon but it is not until next month he presents today saying that this is the same kind of pain as he had last month started about 4 days ago with no known injury he has had lower back surgery in the past over 20 years ago.  He states he does have some numbness to his foot he cannot be real specific as to the location he has not lost any motor function no loss of bowel or bladder control    Nursing Notes were all reviewed and agreed with or any disagreements were addressed in the HPI.    REVIEW OF SYSTEMS :      Review of Systems     systems reviewed and negative except as in HPI/MDM    SURGICAL HISTORY     Past Surgical History:   Procedure Laterality Date    APPENDECTOMY      BACK SURGERY      CHOLECYSTECTOMY      HAND SURGERY Right 2014    HERNIA REPAIR         CURRENTMEDICATIONS       Previous Medications    CETIRIZINE (ZYRTEC) 10 MG TABLET    Take 1 tablet by mouth daily as needed for Allergies    DULOXETINE (CYMBALTA) 60 MG EXTENDED RELEASE CAPSULE    Take 1 capsule by mouth 2 times daily    FLUTICASONE (FLONASE) 50 MCG/ACT NASAL SPRAY    1-2 sprays by

## 2024-02-19 ENCOUNTER — HOSPITAL ENCOUNTER (EMERGENCY)
Facility: HOSPITAL | Age: 74
Discharge: HOME | End: 2024-02-19
Attending: HOSPITALIST
Payer: MEDICARE

## 2024-02-19 VITALS
SYSTOLIC BLOOD PRESSURE: 116 MMHG | DIASTOLIC BLOOD PRESSURE: 70 MMHG | TEMPERATURE: 98 F | OXYGEN SATURATION: 94 % | BODY MASS INDEX: 25.18 KG/M2 | RESPIRATION RATE: 18 BRPM | HEART RATE: 112 BPM | HEIGHT: 69 IN | WEIGHT: 170 LBS

## 2024-02-19 DIAGNOSIS — M54.32 LEFT SIDED SCIATICA: Primary | ICD-10-CM

## 2024-02-19 PROCEDURE — 96372 THER/PROPH/DIAG INJ SC/IM: CPT

## 2024-02-19 PROCEDURE — 99284 EMERGENCY DEPT VISIT MOD MDM: CPT

## 2024-02-19 PROCEDURE — 6360000002 HC RX W HCPCS: Performed by: HOSPITALIST

## 2024-02-19 RX ORDER — METHOCARBAMOL 750 MG/1
750 TABLET, FILM COATED ORAL 3 TIMES DAILY
COMMUNITY

## 2024-02-19 RX ORDER — DEXAMETHASONE SODIUM PHOSPHATE 10 MG/ML
10 INJECTION INTRAMUSCULAR; INTRAVENOUS ONCE
Status: COMPLETED | OUTPATIENT
Start: 2024-02-19 | End: 2024-02-19

## 2024-02-19 RX ADMIN — HYDROMORPHONE HYDROCHLORIDE 1 MG: 1 INJECTION, SOLUTION INTRAMUSCULAR; INTRAVENOUS; SUBCUTANEOUS at 13:15

## 2024-02-19 RX ADMIN — DEXAMETHASONE SODIUM PHOSPHATE 10 MG: 10 INJECTION INTRAMUSCULAR; INTRAVENOUS at 13:15

## 2024-02-19 ASSESSMENT — PAIN DESCRIPTION - LOCATION
LOCATION: LEG
LOCATION: HIP
LOCATION: LEG

## 2024-02-19 ASSESSMENT — PAIN - FUNCTIONAL ASSESSMENT
PAIN_FUNCTIONAL_ASSESSMENT: 0-10
PAIN_FUNCTIONAL_ASSESSMENT: 0-10

## 2024-02-19 ASSESSMENT — PAIN DESCRIPTION - DESCRIPTORS
DESCRIPTORS: ACHING
DESCRIPTORS: ACHING

## 2024-02-19 ASSESSMENT — PAIN DESCRIPTION - ORIENTATION
ORIENTATION: LEFT

## 2024-02-19 ASSESSMENT — PAIN SCALES - GENERAL
PAINLEVEL_OUTOF10: 10
PAINLEVEL_OUTOF10: 10
PAINLEVEL_OUTOF10: 1

## 2024-02-19 ASSESSMENT — LIFESTYLE VARIABLES: HOW OFTEN DO YOU HAVE A DRINK CONTAINING ALCOHOL: NEVER

## 2024-02-19 ASSESSMENT — PAIN DESCRIPTION - PAIN TYPE: TYPE: CHRONIC PAIN;ACUTE PAIN

## 2024-02-19 NOTE — ED NOTES
Reviewed discharge plan with Derrell Gonzáles.  Encouraged him to f/u with Leilani Anderson APRN and he understood.  NAD noted on discharge.  pt taken to elevator exit door via wheelchair.    Electronically signed by Dagmar Way RN on 2/19/2024 at 2:23 PM

## 2024-02-19 NOTE — ED TRIAGE NOTES
Pt states he is here for the \"same old thing\".  He states \"it's my leg again\".  He has had sciatic pain in th past.  He has been having left hip/leg pain shooting to his knee.  He did have an appt with his pcp but was unable to get there today.  Dr quiroz at bedside at this time

## 2024-02-19 NOTE — ED PROVIDER NOTES
[facility]  EMERGENCY DEPARTMENT ENCOUNTER        Pt Name: Derrell Gonzáles  MRN: [id number]  Birthdate 1950  Date of evaluation: 2/19/2024  Provider: Chaz Solorzano DO  PCP: Leilani Anderson APRN  Note Started: 12:47 PM EST 2/19/24    CHIEF COMPLAINT       Chief Complaint   Patient presents with    Leg Pain     Pt states here for \"same old thing\" sciatic pain       HISTORY OF PRESENT ILLNESS: 1 or more Elements     History from : Patient    Limitations to history : None    Derrell Gonzáles is a 73 y.o. male who presents to emergency department for left leg pain.  Patient states that the single thing that has been seen here before with his sciatic pain.  Initially it was the left and previously after that it was the right side now is back to the left.  Patient states he actually had pain on both sides a couple days ago when this for started but he states the right leg is actually eased up and is improved but he still having some pain and discomfort to the left leg.  He states he occasionally gets some numbness and tingling there.  He states it goes down to about the knee and stops which is really more consistent with a piriformis type presentation than true sciatica.  He denies any fall trauma or injury.  He did have CT scan of the lumbar spine performed by me back in January when the symptoms first started which did show some osteoarthritis of the area.  Otherwise he denies any other complaints.  Denies any loss of bowel or bladder control.  No fevers or chills.  No red flags concerning for spinal abscess.  Past medical history significant for bipolar affective, cholesterol blood decreased, chronic back pain, COPD, depression, degenerative joint disease, environmental allergies, GERD, hypertension, insomnia, seizures    Nursing Notes were all reviewed and agreed with or any disagreements were addressed in the HPI.    REVIEW OF SYSTEMS :      Review of Systems    Review of systems

## 2024-03-05 ENCOUNTER — HOSPITAL ENCOUNTER (EMERGENCY)
Facility: HOSPITAL | Age: 74
Discharge: HOME | End: 2024-03-06
Attending: FAMILY MEDICINE
Payer: MEDICARE

## 2024-03-05 ENCOUNTER — APPOINTMENT (OUTPATIENT)
Dept: GENERAL RADIOLOGY | Facility: HOSPITAL | Age: 74
End: 2024-03-05
Attending: FAMILY MEDICINE
Payer: MEDICARE

## 2024-03-05 DIAGNOSIS — U07.1 ACUTE COVID-19: Primary | ICD-10-CM

## 2024-03-05 DIAGNOSIS — N30.00 ACUTE CYSTITIS WITHOUT HEMATURIA: ICD-10-CM

## 2024-03-05 LAB
ALBUMIN SERPL-MCNC: 3.7 G/DL (ref 3.4–4.8)
ALBUMIN/GLOB SERPL: 1.1 {RATIO} (ref 0.8–2)
ALP SERPL-CCNC: 35 U/L (ref 25–100)
ALT SERPL-CCNC: 45 U/L (ref 4–36)
ANION GAP SERPL CALCULATED.3IONS-SCNC: 14 MMOL/L (ref 3–16)
AST SERPL-CCNC: 60 U/L (ref 8–33)
BACTERIA URNS QL MICRO: ABNORMAL /HPF
BASOPHILS # BLD: 0 K/UL (ref 0–0.1)
BASOPHILS NFR BLD: 0.4 %
BILIRUB SERPL-MCNC: 0.3 MG/DL (ref 0.3–1.2)
BILIRUB UR QL STRIP.AUTO: ABNORMAL
BUN SERPL-MCNC: 12 MG/DL (ref 6–20)
CALCIUM SERPL-MCNC: 9.2 MG/DL (ref 8.5–10.5)
CHLORIDE SERPL-SCNC: 93 MMOL/L (ref 98–107)
CLARITY UR: ABNORMAL
CO2 SERPL-SCNC: 25 MMOL/L (ref 20–30)
COLOR UR: YELLOW
CREAT SERPL-MCNC: 0.7 MG/DL (ref 0.4–1.2)
EOSINOPHIL # BLD: 0 K/UL (ref 0–0.4)
EOSINOPHIL NFR BLD: 0.2 %
EPI CELLS #/AREA URNS HPF: ABNORMAL /HPF (ref 0–5)
ERYTHROCYTE [DISTWIDTH] IN BLOOD BY AUTOMATED COUNT: 14.2 % (ref 11–16)
FLUAV AG NPH QL: NEGATIVE
FLUBV AG NPH QL: NEGATIVE
GFR SERPLBLD CREATININE-BSD FMLA CKD-EPI: >60 ML/MIN/{1.73_M2}
GLOBULIN SER CALC-MCNC: 3.5 G/DL
GLUCOSE SERPL-MCNC: 105 MG/DL (ref 74–106)
GLUCOSE UR STRIP.AUTO-MCNC: NEGATIVE MG/DL
HCT VFR BLD AUTO: 44.6 % (ref 40–54)
HGB BLD-MCNC: 14.8 G/DL (ref 13–18)
HGB UR QL STRIP.AUTO: NEGATIVE
IMM GRANULOCYTES # BLD: 0 K/UL
IMM GRANULOCYTES NFR BLD: 0.6 % (ref 0–5)
KETONES UR STRIP.AUTO-MCNC: 40 MG/DL
LACTATE BLDV-SCNC: 1.9 MMOL/L (ref 0.4–1.9)
LEUKOCYTE ESTERASE UR QL STRIP.AUTO: NEGATIVE
LYMPHOCYTES # BLD: 0.7 K/UL (ref 1.5–4)
LYMPHOCYTES NFR BLD: 13.2 %
MCH RBC QN AUTO: 32.5 PG (ref 27–32)
MCHC RBC AUTO-ENTMCNC: 33.2 G/DL (ref 31–35)
MCV RBC AUTO: 97.8 FL (ref 80–100)
MONOCYTES # BLD: 0.5 K/UL (ref 0.2–0.8)
MONOCYTES NFR BLD: 8.9 %
NEUTROPHILS # BLD: 4.1 K/UL (ref 2–7.5)
NEUTS SEG NFR BLD: 76.7 %
NITRITE UR QL STRIP.AUTO: NEGATIVE
PH UR STRIP.AUTO: 6 [PH] (ref 5–8)
PLATELET # BLD AUTO: 260 K/UL (ref 150–400)
PMV BLD AUTO: 9.4 FL (ref 6–10)
POTASSIUM SERPL-SCNC: 4.1 MMOL/L (ref 3.4–5.1)
PROT SERPL-MCNC: 7.2 G/DL (ref 6.4–8.3)
PROT UR STRIP.AUTO-MCNC: 30 MG/DL
RBC # BLD AUTO: 4.56 M/UL (ref 4.5–6)
RBC #/AREA URNS HPF: ABNORMAL /HPF (ref 0–4)
SARS-COV-2 RDRP RESP QL NAA+PROBE: DETECTED
SODIUM SERPL-SCNC: 132 MMOL/L (ref 136–145)
SP GR UR STRIP.AUTO: 1.02 (ref 1–1.03)
UA COMPLETE W REFLEX CULTURE PNL UR: YES
UA DIPSTICK W REFLEX MICRO PNL UR: YES
URN SPEC COLLECT METH UR: ABNORMAL
UROBILINOGEN UR STRIP-ACNC: 1 E.U./DL
WBC # BLD AUTO: 5.4 K/UL (ref 4–11)
WBC #/AREA URNS HPF: ABNORMAL /HPF (ref 0–5)

## 2024-03-05 PROCEDURE — 87086 URINE CULTURE/COLONY COUNT: CPT

## 2024-03-05 PROCEDURE — 71045 X-RAY EXAM CHEST 1 VIEW: CPT

## 2024-03-05 PROCEDURE — 2580000003 HC RX 258: Performed by: FAMILY MEDICINE

## 2024-03-05 PROCEDURE — 6360000002 HC RX W HCPCS: Performed by: FAMILY MEDICINE

## 2024-03-05 PROCEDURE — 85025 COMPLETE CBC W/AUTO DIFF WBC: CPT

## 2024-03-05 PROCEDURE — 83605 ASSAY OF LACTIC ACID: CPT

## 2024-03-05 PROCEDURE — 87040 BLOOD CULTURE FOR BACTERIA: CPT

## 2024-03-05 PROCEDURE — 96374 THER/PROPH/DIAG INJ IV PUSH: CPT

## 2024-03-05 PROCEDURE — 96361 HYDRATE IV INFUSION ADD-ON: CPT

## 2024-03-05 PROCEDURE — 81001 URINALYSIS AUTO W/SCOPE: CPT

## 2024-03-05 PROCEDURE — 80053 COMPREHEN METABOLIC PANEL: CPT

## 2024-03-05 PROCEDURE — 87804 INFLUENZA ASSAY W/OPTIC: CPT

## 2024-03-05 PROCEDURE — 87635 SARS-COV-2 COVID-19 AMP PRB: CPT

## 2024-03-05 PROCEDURE — 36415 COLL VENOUS BLD VENIPUNCTURE: CPT

## 2024-03-05 PROCEDURE — 99285 EMERGENCY DEPT VISIT HI MDM: CPT

## 2024-03-05 RX ORDER — 0.9 % SODIUM CHLORIDE 0.9 %
1000 INTRAVENOUS SOLUTION INTRAVENOUS ONCE
Status: COMPLETED | OUTPATIENT
Start: 2024-03-05 | End: 2024-03-05

## 2024-03-05 RX ORDER — DEXAMETHASONE SODIUM PHOSPHATE 10 MG/ML
10 INJECTION INTRAMUSCULAR; INTRAVENOUS ONCE
Status: COMPLETED | OUTPATIENT
Start: 2024-03-05 | End: 2024-03-05

## 2024-03-05 RX ADMIN — DEXAMETHASONE SODIUM PHOSPHATE 10 MG: 10 INJECTION INTRAMUSCULAR; INTRAVENOUS at 22:17

## 2024-03-05 RX ADMIN — SODIUM CHLORIDE 1000 ML: 9 INJECTION, SOLUTION INTRAVENOUS at 22:17

## 2024-03-05 ASSESSMENT — PAIN DESCRIPTION - LOCATION: LOCATION: RIB CAGE

## 2024-03-05 ASSESSMENT — PAIN - FUNCTIONAL ASSESSMENT: PAIN_FUNCTIONAL_ASSESSMENT: 0-10

## 2024-03-05 ASSESSMENT — PAIN SCALES - GENERAL: PAINLEVEL_OUTOF10: 1

## 2024-03-06 VITALS
RESPIRATION RATE: 22 BRPM | HEART RATE: 82 BPM | WEIGHT: 140 LBS | OXYGEN SATURATION: 94 % | DIASTOLIC BLOOD PRESSURE: 92 MMHG | TEMPERATURE: 97.6 F | SYSTOLIC BLOOD PRESSURE: 127 MMHG | HEIGHT: 69 IN | BODY MASS INDEX: 20.73 KG/M2

## 2024-03-06 RX ORDER — NITROFURANTOIN 25; 75 MG/1; MG/1
100 CAPSULE ORAL 2 TIMES DAILY
Qty: 20 CAPSULE | Refills: 0 | Status: SHIPPED | OUTPATIENT
Start: 2024-03-06 | End: 2024-03-16

## 2024-03-06 ASSESSMENT — ENCOUNTER SYMPTOMS
COUGH: 1
NAUSEA: 1

## 2024-03-06 ASSESSMENT — PAIN - FUNCTIONAL ASSESSMENT: PAIN_FUNCTIONAL_ASSESSMENT: NONE - DENIES PAIN

## 2024-03-06 NOTE — ED PROVIDER NOTES
[facility]  EMERGENCY DEPARTMENT ENCOUNTER        Pt Name: Derrell Gonzáles  MRN: [id number]  Birthdate 1950  Date of evaluation: 3/5/2024  Provider: Jair Mayo DO  PCP: Leilani Anderson APRN  Note Started: 9:50 PM EST 3/5/24    CHIEF COMPLAINT       Chief Complaint   Patient presents with    Cough    Rib Pain       HISTORY OF PRESENT ILLNESS: 1 or more Elements     History from : Patient    Limitations to history : None    Derrell Gonzáles is a 73 y.o. male who presents to ED for having productive cough, body aches, headache, nausea. Abnormal smell. Wife recently with COVID about 2 weeks. Loss of appetite. Subjective fever.  Patient states he has a 1 pack/day smoking history, history of COPD, patient states he has not smoked much in the past week.  No significant shortness of breath.  No precordial chest pain or pleuritic pain.  No vomiting.  No diarrhea.  Cough is productive.  Patient brought by [facility], no significant fever.  Some chills and sweats.  No sore throat, ear pain, sinus congestion.    Nursing Notes were all reviewed and agreed with or any disagreements were addressed in the HPI.    REVIEW OF SYSTEMS :      Review of Systems   Constitutional:  Positive for activity change, appetite change, chills, diaphoresis and fever.   Respiratory:  Positive for cough.    Gastrointestinal:  Positive for nausea.   Musculoskeletal:  Positive for arthralgias.   Neurological:  Positive for headaches.   All other systems reviewed and are negative.          SURGICAL HISTORY     Past Surgical History:   Procedure Laterality Date    APPENDECTOMY      BACK SURGERY      CHOLECYSTECTOMY      HAND SURGERY Right 2014    HERNIA REPAIR         CURRENTMEDICATIONS       Previous Medications    CETIRIZINE (ZYRTEC) 10 MG TABLET    Take 1 tablet by mouth daily as needed for Allergies    DULOXETINE (CYMBALTA) 60 MG EXTENDED RELEASE CAPSULE    Take 1 capsule by mouth 2 times daily

## 2024-03-06 NOTE — ED NOTES
Patient informed that we are currently waiting for BMT to arrive and take him home, understanding verbalized. Patient voiced no needs or concerns at this time.

## 2024-03-06 NOTE — ED NOTES
Dr. Mayo does not want weight-based fluid bolus. 1000 mL NS bolus ordered.    Spoke with Ms. Prateek regarding Diana. Punch biopsy was performed last week which was consistent with LCV. Patient was seen in clinic yesterday to have bloodwork done which included a CBC, RF, TANA which were all normal. There was some discharge from the punch biopsy site so she was started on augmentin. Patient was also having severe pain in the leg and was not able to stand on it. There is no edema and Yves's sign was negative. Recommended that if Ms. Chandra's suspicious is high for DVT, then an ultrasound would be appropriate. Also recommended that she check HIV, hep B and C, ANCA, and cryos as part of the vasculitis workup. Patient is not on any other medications. Asked Ms. Chandra to keep us updated on patient's status so that we can provide further recommendations if needed.

## 2024-03-06 NOTE — ED NOTES
Patient resting with eyes closed, RR 18, regular and unlabored. Per OMID Mcclure the patient is currently ready for discharge and is waiting for family to pick him up.

## 2024-03-06 NOTE — ED NOTES
BMT was called for pt transport back home. Pts sister and pts wife dont drive and are unable to come pickup pt.

## 2024-03-06 NOTE — ED TRIAGE NOTES
Patient arrives via [facility], transferred to [facility]3]. Per [facility] report, called to patient's residence for generalized sickness. Patient reports that he started feeling bad a couple of weeks ago, but got worse on Friday. His wife tested positive for covid 1-2 weeks ago, but he was never tested. He reports having a productive cough, headache, and nausea. He has decreased appetite and has not been drinking as much as usual.

## 2024-03-06 NOTE — ED NOTES
Spoke with patient's wife, Licha, there is no one able to come get the patient tonight. She does not drive and their  will not be back until the AM.

## 2024-03-06 NOTE — ED NOTES
AVS and Rx reviewed with patient, understanding verbalized. Patient encouraged to follow up with his pcp and /or return to ED if symptoms worsen. No further needs or concerns voiced.

## 2024-03-06 NOTE — ED NOTES
Patient has made multiple attempts to find a ride home. Per his report, no one can pick him up until in the AM.

## 2024-03-07 LAB — BACTERIA UR CULT: NORMAL

## 2024-03-10 LAB
BACTERIA BLD CULT ORG #2: NORMAL
BACTERIA BLD CULT: NORMAL

## 2024-04-04 ENCOUNTER — APPOINTMENT (OUTPATIENT)
Dept: GENERAL RADIOLOGY | Facility: HOSPITAL | Age: 74
End: 2024-04-04
Attending: EMERGENCY MEDICINE
Payer: MEDICARE

## 2024-04-04 ENCOUNTER — HOSPITAL ENCOUNTER (EMERGENCY)
Facility: HOSPITAL | Age: 74
Discharge: ANOTHER HOSPITAL | End: 2024-04-05
Attending: EMERGENCY MEDICINE | Admitting: INTERNAL MEDICINE
Payer: MEDICARE

## 2024-04-04 ENCOUNTER — APPOINTMENT (OUTPATIENT)
Dept: CT IMAGING | Facility: HOSPITAL | Age: 74
End: 2024-04-04
Attending: EMERGENCY MEDICINE
Payer: MEDICARE

## 2024-04-04 DIAGNOSIS — N30.00 ACUTE CYSTITIS WITHOUT HEMATURIA: ICD-10-CM

## 2024-04-04 DIAGNOSIS — K92.0 HEMATEMESIS WITH NAUSEA: ICD-10-CM

## 2024-04-04 DIAGNOSIS — F31.60 BIPOLAR AFFECTIVE DISORDER, CURRENT EPISODE MIXED, CURRENT EPISODE SEVERITY UNSPECIFIED (HCC): ICD-10-CM

## 2024-04-04 DIAGNOSIS — R40.4 TRANSIENT ALTERATION OF AWARENESS: ICD-10-CM

## 2024-04-04 DIAGNOSIS — R56.9 SEIZURE (HCC): Primary | ICD-10-CM

## 2024-04-04 LAB
ALBUMIN SERPL-MCNC: 4.1 G/DL (ref 3.4–4.8)
ALBUMIN/GLOB SERPL: 1.1 {RATIO} (ref 0.8–2)
ALP SERPL-CCNC: 36 U/L (ref 25–100)
ALT SERPL-CCNC: 25 U/L (ref 4–36)
ANION GAP SERPL CALCULATED.3IONS-SCNC: 24 MMOL/L (ref 3–16)
AST SERPL-CCNC: 28 U/L (ref 8–33)
BACTERIA URNS QL MICRO: ABNORMAL /HPF
BASOPHILS # BLD: 0.1 K/UL (ref 0–0.1)
BASOPHILS NFR BLD: 0.5 %
BILIRUB SERPL-MCNC: 0.6 MG/DL (ref 0.3–1.2)
BILIRUB UR QL STRIP.AUTO: ABNORMAL
BUN SERPL-MCNC: 15 MG/DL (ref 6–20)
CALCIUM SERPL-MCNC: 9.1 MG/DL (ref 8.5–10.5)
CHLORIDE SERPL-SCNC: 87 MMOL/L (ref 98–107)
CLARITY UR: ABNORMAL
CO2 SERPL-SCNC: 17 MMOL/L (ref 20–30)
COLOR UR: YELLOW
CREAT SERPL-MCNC: 0.9 MG/DL (ref 0.4–1.2)
EOSINOPHIL # BLD: 0.1 K/UL (ref 0–0.4)
EOSINOPHIL NFR BLD: 0.3 %
ERYTHROCYTE [DISTWIDTH] IN BLOOD BY AUTOMATED COUNT: 14.8 % (ref 11–16)
ETHANOLAMINE SERPL-MCNC: NORMAL MG/DL (ref 0–0.08)
GFR SERPLBLD CREATININE-BSD FMLA CKD-EPI: >90 ML/MIN/{1.73_M2}
GLOBULIN SER CALC-MCNC: 3.7 G/DL
GLUCOSE SERPL-MCNC: 155 MG/DL (ref 74–106)
GLUCOSE UR STRIP.AUTO-MCNC: NEGATIVE MG/DL
HCT VFR BLD AUTO: 44.2 % (ref 40–54)
HGB BLD-MCNC: 15.4 G/DL (ref 13–18)
HGB UR QL STRIP.AUTO: ABNORMAL
IMM GRANULOCYTES # BLD: 0.9 K/UL
IMM GRANULOCYTES NFR BLD: 4.3 % (ref 0–5)
KETONES UR STRIP.AUTO-MCNC: >=80 MG/DL
LACTATE BLDV-SCNC: 2.9 MMOL/L (ref 0.4–1.9)
LEUKOCYTE ESTERASE UR QL STRIP.AUTO: NEGATIVE
LYMPHOCYTES # BLD: 1.2 K/UL (ref 1.5–4)
LYMPHOCYTES NFR BLD: 5.6 %
MAGNESIUM SERPL-MCNC: 1.8 MG/DL (ref 1.7–2.4)
MCH RBC QN AUTO: 33.4 PG (ref 27–32)
MCHC RBC AUTO-ENTMCNC: 34.8 G/DL (ref 31–35)
MCV RBC AUTO: 95.9 FL (ref 80–100)
MONOCYTES # BLD: 1.6 K/UL (ref 0.2–0.8)
MONOCYTES NFR BLD: 7.7 %
NEUTROPHILS # BLD: 17.2 K/UL (ref 2–7.5)
NEUTS SEG NFR BLD: 81.6 %
NITRITE UR QL STRIP.AUTO: NEGATIVE
PH UR STRIP.AUTO: 6 [PH] (ref 5–8)
PLATELET # BLD AUTO: 456 K/UL (ref 150–400)
PMV BLD AUTO: 9.6 FL (ref 6–10)
POTASSIUM SERPL-SCNC: 4.1 MMOL/L (ref 3.4–5.1)
PROT SERPL-MCNC: 7.8 G/DL (ref 6.4–8.3)
PROT UR STRIP.AUTO-MCNC: 100 MG/DL
RBC # BLD AUTO: 4.61 M/UL (ref 4.5–6)
RBC #/AREA URNS HPF: ABNORMAL /HPF (ref 0–4)
REASON FOR REJECTION: NORMAL
REJECTED TEST: NORMAL
SODIUM SERPL-SCNC: 128 MMOL/L (ref 136–145)
SP GR UR STRIP.AUTO: >=1.03 (ref 1–1.03)
UA COMPLETE W REFLEX CULTURE PNL UR: YES
UA DIPSTICK W REFLEX MICRO PNL UR: YES
URN SPEC COLLECT METH UR: ABNORMAL
UROBILINOGEN UR STRIP-ACNC: 0.2 E.U./DL
WBC # BLD AUTO: 21 K/UL (ref 4–11)
WBC #/AREA URNS HPF: ABNORMAL /HPF (ref 0–5)

## 2024-04-04 PROCEDURE — 84484 ASSAY OF TROPONIN QUANT: CPT

## 2024-04-04 PROCEDURE — 85025 COMPLETE CBC W/AUTO DIFF WBC: CPT

## 2024-04-04 PROCEDURE — 83735 ASSAY OF MAGNESIUM: CPT

## 2024-04-04 PROCEDURE — 82550 ASSAY OF CK (CPK): CPT

## 2024-04-04 PROCEDURE — 96367 TX/PROPH/DG ADDL SEQ IV INF: CPT

## 2024-04-04 PROCEDURE — 96376 TX/PRO/DX INJ SAME DRUG ADON: CPT

## 2024-04-04 PROCEDURE — 36415 COLL VENOUS BLD VENIPUNCTURE: CPT

## 2024-04-04 PROCEDURE — 83605 ASSAY OF LACTIC ACID: CPT

## 2024-04-04 PROCEDURE — 6360000002 HC RX W HCPCS: Performed by: EMERGENCY MEDICINE

## 2024-04-04 PROCEDURE — 2580000003 HC RX 258: Performed by: EMERGENCY MEDICINE

## 2024-04-04 PROCEDURE — 96368 THER/DIAG CONCURRENT INF: CPT

## 2024-04-04 PROCEDURE — 81001 URINALYSIS AUTO W/SCOPE: CPT

## 2024-04-04 PROCEDURE — 96375 TX/PRO/DX INJ NEW DRUG ADDON: CPT

## 2024-04-04 PROCEDURE — 80053 COMPREHEN METABOLIC PANEL: CPT

## 2024-04-04 PROCEDURE — 99285 EMERGENCY DEPT VISIT HI MDM: CPT

## 2024-04-04 PROCEDURE — 82077 ASSAY SPEC XCP UR&BREATH IA: CPT

## 2024-04-04 PROCEDURE — 96365 THER/PROPH/DIAG IV INF INIT: CPT

## 2024-04-04 PROCEDURE — 71045 X-RAY EXAM CHEST 1 VIEW: CPT

## 2024-04-04 PROCEDURE — 87086 URINE CULTURE/COLONY COUNT: CPT

## 2024-04-04 PROCEDURE — 83690 ASSAY OF LIPASE: CPT

## 2024-04-04 PROCEDURE — 87040 BLOOD CULTURE FOR BACTERIA: CPT

## 2024-04-04 RX ORDER — ONDANSETRON 2 MG/ML
4 INJECTION INTRAMUSCULAR; INTRAVENOUS ONCE
Status: COMPLETED | OUTPATIENT
Start: 2024-04-04 | End: 2024-04-04

## 2024-04-04 RX ORDER — MORPHINE SULFATE 2 MG/ML
2 INJECTION, SOLUTION INTRAMUSCULAR; INTRAVENOUS
Status: COMPLETED | OUTPATIENT
Start: 2024-04-04 | End: 2024-04-04

## 2024-04-04 RX ORDER — MORPHINE SULFATE 4 MG/ML
4 INJECTION, SOLUTION INTRAMUSCULAR; INTRAVENOUS ONCE
Status: COMPLETED | OUTPATIENT
Start: 2024-04-04 | End: 2024-04-04

## 2024-04-04 RX ORDER — VANCOMYCIN HYDROCHLORIDE 1 G/20ML
INJECTION, POWDER, LYOPHILIZED, FOR SOLUTION INTRAVENOUS
Status: DISCONTINUED
Start: 2024-04-04 | End: 2024-04-05 | Stop reason: ALTCHOICE

## 2024-04-04 RX ORDER — LORAZEPAM 2 MG/ML
1 INJECTION INTRAMUSCULAR ONCE
Status: COMPLETED | OUTPATIENT
Start: 2024-04-04 | End: 2024-04-04

## 2024-04-04 RX ORDER — VANCOMYCIN HYDROCHLORIDE 500 MG/10ML
INJECTION, POWDER, LYOPHILIZED, FOR SOLUTION INTRAVENOUS
Status: DISCONTINUED
Start: 2024-04-04 | End: 2024-04-05 | Stop reason: ALTCHOICE

## 2024-04-04 RX ORDER — 0.9 % SODIUM CHLORIDE 0.9 %
1000 INTRAVENOUS SOLUTION INTRAVENOUS ONCE
Status: COMPLETED | OUTPATIENT
Start: 2024-04-04 | End: 2024-04-04

## 2024-04-04 RX ORDER — 0.9 % SODIUM CHLORIDE 0.9 %
30 INTRAVENOUS SOLUTION INTRAVENOUS ONCE
Status: COMPLETED | OUTPATIENT
Start: 2024-04-04 | End: 2024-04-05

## 2024-04-04 RX ORDER — SODIUM CHLORIDE 9 MG/ML
INJECTION, SOLUTION INTRAVENOUS
Status: DISCONTINUED
Start: 2024-04-04 | End: 2024-04-05 | Stop reason: HOSPADM

## 2024-04-04 RX ORDER — LEVETIRACETAM 10 MG/ML
1000 INJECTION INTRAVASCULAR ONCE
Status: COMPLETED | OUTPATIENT
Start: 2024-04-04 | End: 2024-04-04

## 2024-04-04 RX ADMIN — MORPHINE SULFATE 2 MG: 2 INJECTION, SOLUTION INTRAMUSCULAR; INTRAVENOUS at 17:54

## 2024-04-04 RX ADMIN — MORPHINE SULFATE 4 MG: 4 INJECTION, SOLUTION INTRAMUSCULAR; INTRAVENOUS at 19:57

## 2024-04-04 RX ADMIN — CEFEPIME 2000 MG: 2 INJECTION, POWDER, FOR SOLUTION INTRAVENOUS at 22:04

## 2024-04-04 RX ADMIN — SODIUM CHLORIDE 1000 ML: 9 INJECTION, SOLUTION INTRAVENOUS at 19:56

## 2024-04-04 RX ADMIN — ONDANSETRON 4 MG: 2 INJECTION INTRAMUSCULAR; INTRAVENOUS at 17:57

## 2024-04-04 RX ADMIN — SODIUM CHLORIDE 2000 ML: 9 INJECTION, SOLUTION INTRAVENOUS at 22:09

## 2024-04-04 RX ADMIN — LORAZEPAM 1 MG: 2 INJECTION INTRAMUSCULAR; INTRAVENOUS at 19:24

## 2024-04-04 RX ADMIN — LEVETIRACETAM 1000 MG: 10 INJECTION, SOLUTION INTRAVENOUS at 18:05

## 2024-04-04 RX ADMIN — VANCOMYCIN HYDROCHLORIDE 1500 MG: 1 INJECTION, POWDER, LYOPHILIZED, FOR SOLUTION INTRAVENOUS at 23:01

## 2024-04-04 RX ADMIN — LORAZEPAM 1 MG: 2 INJECTION INTRAMUSCULAR; INTRAVENOUS at 19:58

## 2024-04-04 ASSESSMENT — PAIN SCALES - GENERAL
PAINLEVEL_OUTOF10: 10

## 2024-04-04 ASSESSMENT — PAIN DESCRIPTION - LOCATION
LOCATION: BACK
LOCATION: BACK

## 2024-04-04 ASSESSMENT — PAIN DESCRIPTION - PAIN TYPE: TYPE: CHRONIC PAIN

## 2024-04-04 ASSESSMENT — PAIN DESCRIPTION - DESCRIPTORS
DESCRIPTORS: SHARP;SHOOTING;STABBING;THROBBING
DESCRIPTORS: SHARP

## 2024-04-04 ASSESSMENT — PAIN - FUNCTIONAL ASSESSMENT: PAIN_FUNCTIONAL_ASSESSMENT: 0-10

## 2024-04-04 ASSESSMENT — PAIN DESCRIPTION - ORIENTATION: ORIENTATION: MID;UPPER

## 2024-04-04 NOTE — ED TRIAGE NOTES
Pt to ED via [facility] after witnessed seizure activity (approx 2-3 minutes per pts wife).  Pt is alert at this time and complaining of back pain.

## 2024-04-04 NOTE — ED PROVIDER NOTES
7:02 PM: I discussed the history, physical examination, laboratory and imaging studies, and treatment plan with Dr. Stoddard. Derrell Gonzáles was signed out to me in stable condition. Please see Dr. Stoddard's documentation for details of their history, physical, and laboratory studies.  Upon re-examination, a summary of Derrell Gonzáles's history, physical examination, and studies are as follows:    Results for orders placed or performed during the hospital encounter of 04/04/24   CBC with Auto Differential   Result Value Ref Range    WBC 21.0 (H) 4.0 - 11.0 K/uL    RBC 4.61 4.50 - 6.00 M/uL    Hemoglobin 15.4 13.0 - 18.0 g/dL    Hematocrit 44.2 40.0 - 54.0 %    MCV 95.9 80.0 - 100.0 fL    MCH 33.4 (H) 27.0 - 32.0 pg    MCHC 34.8 31.0 - 35.0 g/dL    RDW 14.8 11.0 - 16.0 %    Platelets 456 (H) 150 - 400 K/uL    MPV 9.6 6.0 - 10.0 fL    Neutrophils % 81.6 %    Immature Granulocytes % 4.3 0.0 - 5.0 %    Lymphocytes % 5.6 %    Monocytes % 7.7 %    Eosinophils % 0.3 %    Basophils % 0.5 %    Neutrophils Absolute 17.2 (H) 2.0 - 7.5 K/uL    Immature Granulocytes # 0.9 K/uL    Lymphocytes Absolute 1.2 (L) 1.5 - 4.0 K/uL    Monocytes Absolute 1.6 (H) 0.2 - 0.8 K/uL    Eosinophils Absolute 0.1 0.0 - 0.4 K/uL    Basophils Absolute 0.1 0.0 - 0.1 K/uL   Urinalysis with Reflex to Culture    Specimen: Urine   Result Value Ref Range    Color, UA Yellow Straw/Yellow    Clarity, UA CLOUDY (A) Clear    Glucose, Ur Negative Negative mg/dL    Bilirubin Urine SMALL (A) Negative    Ketones, Urine >=80 (A) Negative mg/dL    Specific Gravity, UA >=1.030 1.005 - 1.030    Blood, Urine TRACE-INTACT (A) Negative    pH, UA 6.0 5.0 - 8.0    Protein,  (A) Negative mg/dL    Urobilinogen, Urine 0.2 <2.0 E.U./dL    Nitrite, Urine Negative Negative    Leukocyte Esterase, Urine Negative Negative    Microscopic Examination YES     Urine Type NotGiven     Urine Reflex to Culture Yes    SPECIMEN REJECTION   Result Value Ref Range    Rejected Test CMP MG     
CTAB  ABDOMEN: Soft. Non-tender. No guarding or rebound.  EXTREMITIES: No acute deformities.  SKIN: Warm and dry.  NEUROLOGICAL: Alert and oriented x 2 cranial nerves II through XII intact sensation intact in both arms and legs motor strength good both arms and leg PSYCHIATRIC: Yelling out secondary to pain        DIAGNOSTIC RESULTS   LABS:    Labs Reviewed   CBC WITH AUTO DIFFERENTIAL - Abnormal; Notable for the following components:       Result Value    WBC 21.0 (*)     MCH 33.4 (*)     Platelets 456 (*)     Neutrophils Absolute 17.2 (*)     Lymphocytes Absolute 1.2 (*)     Monocytes Absolute 1.6 (*)     All other components within normal limits   COMPREHENSIVE METABOLIC PANEL - Abnormal; Notable for the following components:    Sodium 128 (*)     Chloride 87 (*)     CO2 17 (*)     Anion Gap 24 (*)     Glucose 155 (*)     All other components within normal limits    Narrative:     RECOLLECT   SPECIMEN REJECTION   MAGNESIUM    Narrative:     RECOLLECT   URINALYSIS WITH REFLEX TO CULTURE   ETHANOL       When ordered only abnormal lab results are displayed. All other labs were within normal range or not returned as of this dictation.    EKG:     RADIOLOGY:   Non-plain film images such as CT, Ultrasound and MRI are read by the radiologist. Plain radiographic images are visualized and preliminarily interpreted by the ED Provider with the below findings:        Interpretation per the Radiologist below, if available at the time of this note:    No orders to display     No results found.    No results found.    PROCEDURES   Unless otherwise noted below, none     Procedures    CRITICAL CARE TIME       EMERGENCY DEPARTMENT COURSE and DIFFERENTIAL DIAGNOSIS/MDM:   Vitals:    Vitals:    04/04/24 1736   BP: 115/79   Pulse: (!) 122   Resp: 20   Temp: 97.6 °F (36.4 °C)   TempSrc: Oral   SpO2: 95%   Weight: 63.5 kg (140 lb)   Height: 1.753 m (5' 9\")       Patient was given the following medications:  Medications   morphine (PF)

## 2024-04-05 ENCOUNTER — APPOINTMENT (OUTPATIENT)
Dept: CT IMAGING | Facility: HOSPITAL | Age: 74
End: 2024-04-05
Attending: EMERGENCY MEDICINE
Payer: MEDICARE

## 2024-04-05 VITALS
OXYGEN SATURATION: 91 % | RESPIRATION RATE: 19 BRPM | DIASTOLIC BLOOD PRESSURE: 77 MMHG | HEART RATE: 130 BPM | BODY MASS INDEX: 20.73 KG/M2 | WEIGHT: 140 LBS | HEIGHT: 69 IN | SYSTOLIC BLOOD PRESSURE: 105 MMHG | TEMPERATURE: 98.8 F

## 2024-04-05 PROBLEM — R56.9 SEIZURE (HCC): Status: ACTIVE | Noted: 2024-04-05

## 2024-04-05 LAB
BASE EXCESS BLDA CALC-SCNC: -2.7 MMOL/L (ref -3–3)
CK SERPL-CCNC: 53 U/L (ref 26–174)
CO2 BLDA-SCNC: 20.8 MMOL/L (ref 24–30)
HCO3 BLDA-SCNC: 20 MMOL/L (ref 22–26)
HEMOCCULT SP1 STL QL: ABNORMAL
HGB BLDA-MCNC: 11.6 G/DL
INHALED O2 FLOW RATE: 0.21 %
INR PPP: 1.16 (ref 0.87–1.11)
LACTATE BLDV-SCNC: 1.9 MMOL/L (ref 0.4–1.9)
LIPASE SERPL-CCNC: 26 U/L (ref 5.6–51.3)
O2 THERAPY: ABNORMAL
PCO2 BLDA: 28.3 MMHG (ref 35–45)
PH BLDA: 7.47 [PH] (ref 7.35–7.45)
PO2 BLDA: 60.1 MMHG (ref 80–100)
PROTHROMBIN TIME: 14.6 SEC (ref 11.8–14.2)
SAO2 % BLDA: 90.5 %
TROPONIN, HIGH SENSITIVITY: 15 NG/L (ref 0–22)

## 2024-04-05 PROCEDURE — 70450 CT HEAD/BRAIN W/O DYE: CPT

## 2024-04-05 PROCEDURE — 82270 OCCULT BLOOD FECES: CPT

## 2024-04-05 PROCEDURE — 83605 ASSAY OF LACTIC ACID: CPT

## 2024-04-05 PROCEDURE — 96361 HYDRATE IV INFUSION ADD-ON: CPT

## 2024-04-05 PROCEDURE — 74176 CT ABD & PELVIS W/O CONTRAST: CPT

## 2024-04-05 PROCEDURE — 2580000003 HC RX 258: Performed by: EMERGENCY MEDICINE

## 2024-04-05 PROCEDURE — 6360000002 HC RX W HCPCS: Performed by: EMERGENCY MEDICINE

## 2024-04-05 PROCEDURE — 96367 TX/PROPH/DG ADDL SEQ IV INF: CPT

## 2024-04-05 PROCEDURE — 96366 THER/PROPH/DIAG IV INF ADDON: CPT

## 2024-04-05 PROCEDURE — 36415 COLL VENOUS BLD VENIPUNCTURE: CPT

## 2024-04-05 PROCEDURE — C9113 INJ PANTOPRAZOLE SODIUM, VIA: HCPCS | Performed by: EMERGENCY MEDICINE

## 2024-04-05 PROCEDURE — 82803 BLOOD GASES ANY COMBINATION: CPT

## 2024-04-05 PROCEDURE — 96375 TX/PRO/DX INJ NEW DRUG ADDON: CPT

## 2024-04-05 PROCEDURE — 85610 PROTHROMBIN TIME: CPT

## 2024-04-05 PROCEDURE — 36600 WITHDRAWAL OF ARTERIAL BLOOD: CPT

## 2024-04-05 PROCEDURE — 71250 CT THORAX DX C-: CPT

## 2024-04-05 RX ORDER — ACETAMINOPHEN 325 MG/1
650 TABLET ORAL EVERY 6 HOURS PRN
Status: CANCELLED | OUTPATIENT
Start: 2024-04-05

## 2024-04-05 RX ORDER — POLYETHYLENE GLYCOL 3350 17 G/17G
17 POWDER, FOR SOLUTION ORAL DAILY PRN
Status: CANCELLED | OUTPATIENT
Start: 2024-04-05

## 2024-04-05 RX ORDER — PANTOPRAZOLE SODIUM 40 MG/1
40 TABLET, DELAYED RELEASE ORAL
Status: CANCELLED | OUTPATIENT
Start: 2024-04-05

## 2024-04-05 RX ORDER — LEVETIRACETAM 500 MG/1
500 TABLET ORAL 2 TIMES DAILY
Status: CANCELLED | OUTPATIENT
Start: 2024-04-05

## 2024-04-05 RX ORDER — ONDANSETRON 2 MG/ML
4 INJECTION INTRAMUSCULAR; INTRAVENOUS ONCE
Status: COMPLETED | OUTPATIENT
Start: 2024-04-05 | End: 2024-04-05

## 2024-04-05 RX ORDER — HALOPERIDOL 5 MG/ML
5 INJECTION INTRAMUSCULAR ONCE
Status: COMPLETED | OUTPATIENT
Start: 2024-04-05 | End: 2024-04-05

## 2024-04-05 RX ORDER — PANTOPRAZOLE SODIUM 40 MG/10ML
INJECTION, POWDER, LYOPHILIZED, FOR SOLUTION INTRAVENOUS
Status: DISCONTINUED
Start: 2024-04-05 | End: 2024-04-05 | Stop reason: HOSPADM

## 2024-04-05 RX ORDER — DULOXETIN HYDROCHLORIDE 30 MG/1
60 CAPSULE, DELAYED RELEASE ORAL 2 TIMES DAILY
Status: CANCELLED | OUTPATIENT
Start: 2024-04-05

## 2024-04-05 RX ORDER — METOPROLOL SUCCINATE 25 MG/1
25 TABLET, EXTENDED RELEASE ORAL NIGHTLY
Status: CANCELLED | OUTPATIENT
Start: 2024-04-05

## 2024-04-05 RX ORDER — ONDANSETRON 2 MG/ML
4 INJECTION INTRAMUSCULAR; INTRAVENOUS EVERY 6 HOURS PRN
Status: CANCELLED | OUTPATIENT
Start: 2024-04-05

## 2024-04-05 RX ORDER — ACETAMINOPHEN 650 MG/1
650 SUPPOSITORY RECTAL EVERY 6 HOURS PRN
Status: CANCELLED | OUTPATIENT
Start: 2024-04-05

## 2024-04-05 RX ORDER — ENOXAPARIN SODIUM 100 MG/ML
40 INJECTION SUBCUTANEOUS DAILY
Status: CANCELLED | OUTPATIENT
Start: 2024-04-05

## 2024-04-05 RX ORDER — GAUZE BANDAGE 2" X 2"
100 BANDAGE TOPICAL DAILY
Status: CANCELLED | OUTPATIENT
Start: 2024-04-05

## 2024-04-05 RX ORDER — 0.9 % SODIUM CHLORIDE 0.9 %
500 INTRAVENOUS SOLUTION INTRAVENOUS ONCE
Status: COMPLETED | OUTPATIENT
Start: 2024-04-05 | End: 2024-04-05

## 2024-04-05 RX ORDER — GABAPENTIN 800 MG/1
800 TABLET ORAL 4 TIMES DAILY
Status: CANCELLED | OUTPATIENT
Start: 2024-04-05

## 2024-04-05 RX ORDER — ONDANSETRON 4 MG/1
4 TABLET, ORALLY DISINTEGRATING ORAL EVERY 8 HOURS PRN
Status: CANCELLED | OUTPATIENT
Start: 2024-04-05

## 2024-04-05 RX ORDER — SODIUM CHLORIDE 9 MG/ML
INJECTION, SOLUTION INTRAVENOUS ONCE
Status: CANCELLED | OUTPATIENT
Start: 2024-04-05

## 2024-04-05 RX ADMIN — HALOPERIDOL LACTATE 5 MG: 5 INJECTION, SOLUTION INTRAMUSCULAR at 00:56

## 2024-04-05 RX ADMIN — PANTOPRAZOLE SODIUM 80 MG: 40 INJECTION, POWDER, LYOPHILIZED, FOR SOLUTION INTRAVENOUS at 06:36

## 2024-04-05 RX ADMIN — SODIUM CHLORIDE 500 ML: 9 INJECTION, SOLUTION INTRAVENOUS at 05:30

## 2024-04-05 RX ADMIN — HYDROMORPHONE HYDROCHLORIDE 1 MG: 1 INJECTION, SOLUTION INTRAMUSCULAR; INTRAVENOUS; SUBCUTANEOUS at 05:30

## 2024-04-05 RX ADMIN — ONDANSETRON 4 MG: 2 INJECTION INTRAMUSCULAR; INTRAVENOUS at 00:15

## 2024-04-05 ASSESSMENT — PAIN SCALES - GENERAL: PAINLEVEL_OUTOF10: 0

## 2024-04-05 NOTE — ED NOTES
Pt had two episodes of projectile vomiting in to floor. Vomit was clear, light reddish/pink in color.

## 2024-04-05 NOTE — ED NOTES
Contacted daughter, sara, r/t pt not staying at [facility] and being transferred to [facility]. Advised did not have bed assignment yet but would advise when they give one

## 2024-04-05 NOTE — ED NOTES
PT was flipping back and fourth being difficult for staff trying to draw labs. PT hit his head on the left side of the bed.

## 2024-04-05 NOTE — ED NOTES
Pt back from CT and resting comfortably. Oral temp obtained. SpO2 sensor cord changed out and picking up good pleath. Pt placed back on his NC. Pt diaphoretic, MD aware. Pt sleeping. Bed linens were replaced when Pt was on CT bed.

## 2024-04-05 NOTE — ED NOTES
Attempted to perform CT x 2 (19:15 and 21:00).  Both times the patient was laid on his back he fought to role onto his side and would not cooperate or be still.    Returned patient to ER to discuss with Dr. Smith for further direction.

## 2024-04-05 NOTE — ED NOTES
Pt having increased agitation, and restlessness. Pt refusing to wear monitoring equipment. Pt has voided onto the floor after urinal was provided. Heart rate 123.

## 2024-04-05 NOTE — ED NOTES
Pt says he wants to go to sleep. Pt covered with warm blanket and lights dimmed to promote rest. Pt's daughter and wife at bedside. Call light within reach. Pt and family informed to notify nursing staff of any needs. Pt also given sip of ice water, Ok'd by MD.

## 2024-04-05 NOTE — ED NOTES
Pt with large amt of dark emesis. Positive gastro cult. Pt continues to be restless and confused.

## 2024-04-06 LAB
BACTERIA BLD CULT ORG #2: NORMAL
BACTERIA BLD CULT: NORMAL

## 2024-04-07 LAB
BACTERIA UR CULT: ABNORMAL
ORGANISM: ABNORMAL

## 2024-04-09 LAB
BACTERIA BLD CULT ORG #2: NORMAL
BACTERIA BLD CULT: NORMAL
BACTERIA UR CULT: ABNORMAL
ORGANISM: ABNORMAL

## 2024-06-09 ENCOUNTER — HOSPITAL ENCOUNTER (EMERGENCY)
Facility: HOSPITAL | Age: 74
Discharge: HOME | End: 2024-06-09
Attending: EMERGENCY MEDICINE
Payer: MEDICARE

## 2024-06-09 ENCOUNTER — APPOINTMENT (OUTPATIENT)
Dept: GENERAL RADIOLOGY | Facility: HOSPITAL | Age: 74
End: 2024-06-09
Attending: EMERGENCY MEDICINE
Payer: MEDICARE

## 2024-06-09 VITALS
DIASTOLIC BLOOD PRESSURE: 92 MMHG | SYSTOLIC BLOOD PRESSURE: 117 MMHG | TEMPERATURE: 98 F | RESPIRATION RATE: 18 BRPM | OXYGEN SATURATION: 100 % | HEART RATE: 99 BPM | HEIGHT: 69 IN | WEIGHT: 145 LBS | BODY MASS INDEX: 21.48 KG/M2

## 2024-06-09 DIAGNOSIS — M54.50 ACUTE MIDLINE LOW BACK PAIN WITHOUT SCIATICA: Primary | ICD-10-CM

## 2024-06-09 PROCEDURE — 96372 THER/PROPH/DIAG INJ SC/IM: CPT

## 2024-06-09 PROCEDURE — 72110 X-RAY EXAM L-2 SPINE 4/>VWS: CPT

## 2024-06-09 PROCEDURE — 73522 X-RAY EXAM HIPS BI 3-4 VIEWS: CPT

## 2024-06-09 PROCEDURE — 6360000002 HC RX W HCPCS: Performed by: EMERGENCY MEDICINE

## 2024-06-09 PROCEDURE — 99283 EMERGENCY DEPT VISIT LOW MDM: CPT

## 2024-06-09 RX ORDER — PREDNISONE 20 MG/1
20 TABLET ORAL DAILY
Qty: 5 TABLET | Refills: 0 | Status: SHIPPED | OUTPATIENT
Start: 2024-06-09 | End: 2024-06-14

## 2024-06-09 RX ORDER — KETOROLAC TROMETHAMINE 15 MG/ML
15 INJECTION, SOLUTION INTRAMUSCULAR; INTRAVENOUS ONCE
Status: COMPLETED | OUTPATIENT
Start: 2024-06-09 | End: 2024-06-09

## 2024-06-09 RX ORDER — KETOROLAC TROMETHAMINE 10 MG/1
10 TABLET, FILM COATED ORAL EVERY 6 HOURS PRN
Qty: 20 TABLET | Refills: 0 | Status: SHIPPED | OUTPATIENT
Start: 2024-06-09 | End: 2025-06-09

## 2024-06-09 RX ADMIN — KETOROLAC TROMETHAMINE 15 MG: 15 INJECTION, SOLUTION INTRAMUSCULAR; INTRAVENOUS at 15:46

## 2024-06-09 ASSESSMENT — PAIN DESCRIPTION - ORIENTATION
ORIENTATION: LEFT
ORIENTATION: RIGHT;LEFT

## 2024-06-09 ASSESSMENT — PAIN DESCRIPTION - LOCATION
LOCATION: BACK
LOCATION: HIP

## 2024-06-09 ASSESSMENT — PAIN SCALES - GENERAL
PAINLEVEL_OUTOF10: 10
PAINLEVEL_OUTOF10: 9

## 2024-06-09 ASSESSMENT — PAIN DESCRIPTION - DESCRIPTORS
DESCRIPTORS: SHARP;SHOOTING;ACHING
DESCRIPTORS: ACHING

## 2024-06-09 ASSESSMENT — PAIN DESCRIPTION - FREQUENCY: FREQUENCY: CONTINUOUS

## 2024-06-09 ASSESSMENT — PAIN DESCRIPTION - ONSET: ONSET: GRADUAL

## 2024-06-09 ASSESSMENT — PAIN - FUNCTIONAL ASSESSMENT
PAIN_FUNCTIONAL_ASSESSMENT: 0-10
PAIN_FUNCTIONAL_ASSESSMENT: 0-10

## 2024-06-09 ASSESSMENT — PAIN DESCRIPTION - PAIN TYPE: TYPE: ACUTE PAIN

## 2024-06-09 NOTE — ED NOTES
Reviewed discharge plan with Derrell Gonzáles.  Encouraged him to f/u with Leilani Anderson APRN and he understood.  NAD noted on discharge, pt brought to vehicle via wheel chair.  Reviewed discharge prescription for:     Current Discharge Medication List        START taking these medications    Details   ketorolac (TORADOL) 10 MG tablet Take 1 tablet by mouth every 6 hours as needed for Pain  Qty: 20 tablet, Refills: 0      predniSONE (DELTASONE) 20 MG tablet Take 1 tablet by mouth daily for 5 days  Qty: 5 tablet, Refills: 0             Derrell states understanding of how and when to take medications.      Electronically signed by Dagmar Way RN on 6/9/2024 at 5:12 PM

## 2024-06-09 NOTE — ED PROVIDER NOTES
.        [facility]  EMERGENCY DEPARTMENT ENCOUNTER        Pt Name: Derrell Gonzáles  MRN: [id number]  Birthdate 1950  Date of evaluation: 6/9/2024  Provider: Kaye Stoddard MD  PCP: Leilani Anderson APRN  Note Started: 3:26 PM EDT 6/9/24    CHIEF COMPLAINT       Chief Complaint   Patient presents with    Hip Pain    Abdominal Pain     \"Gas pain\"        HISTORY OF PRESENT ILLNESS: 1 or more Elements     History from : Patient    Limitations to history : None    Derrell Gonzáles is a 74 y.o. male who presents initially came in apparently requesting a shot he signed out AGAINST MEDICAL ADVICE when he was not seen quick enough and apparently before he then got through the door at home he called an ambulance and came back.  He now states \"I need a shot\" he said as well as steroids and something else pain radiating to both hips and down his legs he reportedly has an appointment with Dr. Pena for some type of hip procedure next month.  He saw his primary care 3 days ago and received shots but he is not sure what they were he said initially he felt somewhat better but then last night he started feeling worse again he now complains of hip pain in the right posterior hip he denies any numbness he is having any loss of bowel or bladder control and has good motor strength.    Nursing Notes were all reviewed and agreed with or any disagreements were addressed in the HPI.    REVIEW OF SYSTEMS :      Review of Systems     systems reviewed and negative except as in HPI/MDM    SURGICAL HISTORY     Past Surgical History:   Procedure Laterality Date    APPENDECTOMY      BACK SURGERY      CHOLECYSTECTOMY      HAND SURGERY Right 2014    HERNIA REPAIR         CURRENTMEDICATIONS       Previous Medications    CETIRIZINE (ZYRTEC) 10 MG TABLET    Take 1 tablet by mouth daily as needed for Allergies    DULOXETINE (CYMBALTA) 60 MG EXTENDED RELEASE CAPSULE    Take 1 capsule by mouth 2 times daily    FLUTICASONE

## 2024-06-09 NOTE — ED NOTES
Report received from [facility].     Patient was seen here earlier and is returning for hip pain. Patient had left AMA due to wait time, and his sister picked him up at time of transfer.     Sister calls and states, \"You all are going to have to do something\".

## 2024-06-09 NOTE — ED NOTES
Pt has rung out a couple of times for a \"shot\"  he states that he needs a shot because  that's what his doctor would do.

## 2024-06-09 NOTE — ED TRIAGE NOTES
Report received from [facility].   Patient is a 74 year old male with chief complaint today of hip pain. Patient has a history of sciatica.   /94, 98% on room air. Patient is requesting a pain shot and a steroid shot.

## 2024-06-09 NOTE — ED NOTES
Pt has rung out again and is requesting to leave.  He asked me to call codie at [phone].  I spoke with her and she will be on her way to get pt.

## 2024-07-29 ENCOUNTER — HOSPITAL ENCOUNTER (OUTPATIENT)
Dept: CT IMAGING | Facility: HOSPITAL | Age: 74
Discharge: HOME | End: 2024-07-29
Payer: MEDICARE

## 2024-07-29 ENCOUNTER — HOSPITAL ENCOUNTER (OUTPATIENT)
Facility: HOSPITAL | Age: 74
Discharge: HOME | End: 2024-07-29
Payer: MEDICARE

## 2024-07-29 DIAGNOSIS — R05.9 COUGH, UNSPECIFIED TYPE: ICD-10-CM

## 2024-07-29 DIAGNOSIS — R63.4 WEIGHT LOSS: ICD-10-CM

## 2024-07-29 DIAGNOSIS — R11.2 NAUSEA AND VOMITING, UNSPECIFIED VOMITING TYPE: ICD-10-CM

## 2024-07-29 LAB
ALBUMIN SERPL-MCNC: 4.1 G/DL (ref 3.4–4.8)
ALBUMIN/GLOB SERPL: 1.3 {RATIO} (ref 0.8–2)
ALP SERPL-CCNC: 37 U/L (ref 25–100)
ALT SERPL-CCNC: 7 U/L (ref 4–36)
ANION GAP SERPL CALCULATED.3IONS-SCNC: 13 MMOL/L (ref 3–16)
AST SERPL-CCNC: 15 U/L (ref 8–33)
BILIRUB SERPL-MCNC: 0.3 MG/DL (ref 0.3–1.2)
BUN SERPL-MCNC: 6 MG/DL (ref 6–20)
CALCIUM SERPL-MCNC: 9.6 MG/DL (ref 8.5–10.5)
CHLORIDE SERPL-SCNC: 98 MMOL/L (ref 98–107)
CO2 SERPL-SCNC: 26 MMOL/L (ref 20–30)
CREAT SERPL-MCNC: 0.8 MG/DL (ref 0.4–1.2)
GFR SERPLBLD CREATININE-BSD FMLA CKD-EPI: >90 ML/MIN/{1.73_M2}
GLOBULIN SER CALC-MCNC: 3.2 G/DL
GLUCOSE SERPL-MCNC: 91 MG/DL (ref 74–106)
POTASSIUM SERPL-SCNC: 4.2 MMOL/L (ref 3.4–5.1)
PROT SERPL-MCNC: 7.3 G/DL (ref 6.4–8.3)
SODIUM SERPL-SCNC: 137 MMOL/L (ref 136–145)

## 2024-07-29 PROCEDURE — 74176 CT ABD & PELVIS W/O CONTRAST: CPT

## 2024-07-29 PROCEDURE — 71250 CT THORAX DX C-: CPT

## 2024-07-29 PROCEDURE — 36415 COLL VENOUS BLD VENIPUNCTURE: CPT

## 2024-07-29 PROCEDURE — 80053 COMPREHEN METABOLIC PANEL: CPT
